# Patient Record
Sex: MALE | Race: OTHER | HISPANIC OR LATINO
[De-identification: names, ages, dates, MRNs, and addresses within clinical notes are randomized per-mention and may not be internally consistent; named-entity substitution may affect disease eponyms.]

---

## 2017-01-30 ENCOUNTER — APPOINTMENT (OUTPATIENT)
Dept: UROLOGY | Facility: CLINIC | Age: 47
End: 2017-01-30

## 2017-01-31 ENCOUNTER — APPOINTMENT (OUTPATIENT)
Dept: UROLOGY | Facility: CLINIC | Age: 47
End: 2017-01-31

## 2017-02-12 ENCOUNTER — RESULT REVIEW (OUTPATIENT)
Age: 47
End: 2017-02-12

## 2017-02-13 ENCOUNTER — OUTPATIENT (OUTPATIENT)
Dept: OUTPATIENT SERVICES | Facility: HOSPITAL | Age: 47
LOS: 1 days | Discharge: HOME | End: 2017-02-13

## 2017-02-13 ENCOUNTER — APPOINTMENT (OUTPATIENT)
Dept: HEMATOLOGY ONCOLOGY | Facility: CLINIC | Age: 47
End: 2017-02-13

## 2017-02-13 DIAGNOSIS — D64.9 ANEMIA, UNSPECIFIED: ICD-10-CM

## 2017-02-13 LAB
BASOPHILS # BLD AUTO: 0 K/UL — SIGNIFICANT CHANGE UP (ref 0–0.2)
BASOPHILS NFR BLD AUTO: 0.4 % — SIGNIFICANT CHANGE UP (ref 0–2)
EOSINOPHIL # BLD AUTO: 0.3 K/UL — SIGNIFICANT CHANGE UP (ref 0–0.5)
EOSINOPHIL NFR BLD AUTO: 3.5 % — SIGNIFICANT CHANGE UP (ref 0–6)
HCT VFR BLD CALC: 44.5 % — SIGNIFICANT CHANGE UP (ref 39–50)
HGB BLD-MCNC: 15 G/DL — SIGNIFICANT CHANGE UP (ref 13–17)
LYMPHOCYTES # BLD AUTO: 3 K/UL — SIGNIFICANT CHANGE UP (ref 1–3.3)
LYMPHOCYTES # BLD AUTO: 32 % — SIGNIFICANT CHANGE UP (ref 13–44)
MCHC RBC-ENTMCNC: 26.7 PG — LOW (ref 27–34)
MCHC RBC-ENTMCNC: 33.8 G/DL — SIGNIFICANT CHANGE UP (ref 32–36)
MCV RBC AUTO: 79 FL — LOW (ref 80–100)
MONOCYTES # BLD AUTO: 0.5 K/UL — SIGNIFICANT CHANGE UP (ref 0–0.9)
MONOCYTES NFR BLD AUTO: 5.2 % — SIGNIFICANT CHANGE UP (ref 2–14)
NEUTROPHILS # BLD AUTO: 5.5 K/UL — SIGNIFICANT CHANGE UP (ref 1.8–7.4)
NEUTROPHILS NFR BLD AUTO: 58.9 % — SIGNIFICANT CHANGE UP (ref 43–77)
PLATELET # BLD AUTO: 265 K/UL — SIGNIFICANT CHANGE UP (ref 150–400)
RBC # BLD: 5.64 M/UL — SIGNIFICANT CHANGE UP (ref 4.2–5.8)
RBC # FLD: 17.2 % — HIGH (ref 10.3–14.5)
WBC # BLD: 9.3 K/UL — SIGNIFICANT CHANGE UP (ref 3.8–10.5)
WBC # FLD AUTO: 9.3 K/UL — SIGNIFICANT CHANGE UP (ref 3.8–10.5)

## 2017-02-21 ENCOUNTER — APPOINTMENT (OUTPATIENT)
Dept: UROLOGY | Facility: CLINIC | Age: 47
End: 2017-02-21

## 2017-02-21 DIAGNOSIS — R10.2 PELVIC AND PERINEAL PAIN: ICD-10-CM

## 2017-04-12 ENCOUNTER — APPOINTMENT (OUTPATIENT)
Dept: SURGERY | Facility: CLINIC | Age: 47
End: 2017-04-12

## 2017-04-12 DIAGNOSIS — Z87.19 PERSONAL HISTORY OF OTHER DISEASES OF THE DIGESTIVE SYSTEM: ICD-10-CM

## 2017-04-12 DIAGNOSIS — K58.9 IRRITABLE BOWEL SYNDROME W/OUT DIARRHEA: ICD-10-CM

## 2017-05-08 ENCOUNTER — APPOINTMENT (OUTPATIENT)
Dept: HEMATOLOGY ONCOLOGY | Facility: CLINIC | Age: 47
End: 2017-05-08

## 2017-05-08 ENCOUNTER — LABORATORY RESULT (OUTPATIENT)
Age: 47
End: 2017-05-08

## 2017-05-08 ENCOUNTER — RESULT REVIEW (OUTPATIENT)
Age: 47
End: 2017-05-08

## 2017-05-08 ENCOUNTER — OUTPATIENT (OUTPATIENT)
Dept: OUTPATIENT SERVICES | Facility: HOSPITAL | Age: 47
LOS: 1 days | Discharge: HOME | End: 2017-05-08

## 2017-05-08 VITALS
DIASTOLIC BLOOD PRESSURE: 85 MMHG | OXYGEN SATURATION: 96 % | BODY MASS INDEX: 32.86 KG/M2 | TEMPERATURE: 208.76 F | HEART RATE: 60 BPM | SYSTOLIC BLOOD PRESSURE: 127 MMHG | WEIGHT: 224.43 LBS | RESPIRATION RATE: 16 BRPM

## 2017-05-08 DIAGNOSIS — D64.9 ANEMIA, UNSPECIFIED: ICD-10-CM

## 2017-05-08 LAB
BASOPHILS # BLD AUTO: 0.1 K/UL — SIGNIFICANT CHANGE UP (ref 0–0.2)
BASOPHILS NFR BLD AUTO: 0.9 % — SIGNIFICANT CHANGE UP (ref 0–2)
EOSINOPHIL # BLD AUTO: 0.1 K/UL — SIGNIFICANT CHANGE UP (ref 0–0.5)
EOSINOPHIL NFR BLD AUTO: 0.9 % — SIGNIFICANT CHANGE UP (ref 0–6)
HCT VFR BLD CALC: 46.2 % — SIGNIFICANT CHANGE UP (ref 39–50)
HGB BLD-MCNC: 15.3 G/DL — SIGNIFICANT CHANGE UP (ref 13–17)
LYMPHOCYTES # BLD AUTO: 28.1 % — SIGNIFICANT CHANGE UP (ref 13–44)
LYMPHOCYTES # BLD AUTO: 3 K/UL — SIGNIFICANT CHANGE UP (ref 1–3.3)
MCHC RBC-ENTMCNC: 26.9 PG — LOW (ref 27–34)
MCHC RBC-ENTMCNC: 33.1 G/DL — SIGNIFICANT CHANGE UP (ref 32–36)
MCV RBC AUTO: 81.5 FL — SIGNIFICANT CHANGE UP (ref 80–100)
MONOCYTES # BLD AUTO: 0.5 K/UL — SIGNIFICANT CHANGE UP (ref 0–0.9)
MONOCYTES NFR BLD AUTO: 4.9 % — SIGNIFICANT CHANGE UP (ref 2–14)
NEUTROPHILS # BLD AUTO: 7 K/UL — SIGNIFICANT CHANGE UP (ref 1.8–7.4)
NEUTROPHILS NFR BLD AUTO: 65.1 % — SIGNIFICANT CHANGE UP (ref 43–77)
PLATELET # BLD AUTO: 271 K/UL — SIGNIFICANT CHANGE UP (ref 150–400)
RBC # BLD: 5.67 M/UL — SIGNIFICANT CHANGE UP (ref 4.2–5.8)
RBC # FLD: 14.4 % — SIGNIFICANT CHANGE UP (ref 10.3–14.5)
WBC # BLD: 10.7 K/UL — HIGH (ref 3.8–10.5)
WBC # FLD AUTO: 10.7 K/UL — HIGH (ref 3.8–10.5)

## 2017-05-08 RX ORDER — LANSOPRAZOLE, AMOXICILLIN, CLARITHROMYCIN 30-500-500
KIT ORAL
Qty: 112 | Refills: 0 | Status: DISCONTINUED | COMMUNITY
Start: 2017-04-12

## 2017-05-08 RX ORDER — AMOXICILLIN AND CLAVULANATE POTASSIUM 875; 125 MG/1; MG/1
875-125 TABLET, COATED ORAL
Qty: 10 | Refills: 0 | Status: DISCONTINUED | COMMUNITY
Start: 2017-04-22

## 2017-05-08 RX ORDER — TOPIRAMATE 50 MG/1
50 CAPSULE, EXTENDED RELEASE ORAL
Qty: 30 | Refills: 0 | Status: ACTIVE | COMMUNITY
Start: 2017-04-20

## 2017-05-11 ENCOUNTER — APPOINTMENT (OUTPATIENT)
Dept: INFUSION THERAPY | Facility: HOSPITAL | Age: 47
End: 2017-05-11

## 2017-05-26 ENCOUNTER — EMERGENCY (EMERGENCY)
Facility: HOSPITAL | Age: 47
LOS: 1 days | Discharge: HOME | End: 2017-05-26
Attending: EMERGENCY MEDICINE
Payer: SELF-PAY

## 2017-05-26 VITALS
WEIGHT: 210.1 LBS | DIASTOLIC BLOOD PRESSURE: 87 MMHG | RESPIRATION RATE: 16 BRPM | OXYGEN SATURATION: 98 % | HEART RATE: 87 BPM | TEMPERATURE: 98 F | SYSTOLIC BLOOD PRESSURE: 135 MMHG | HEIGHT: 69 IN
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
## 2017-05-26 DIAGNOSIS — M54.9 DORSALGIA, UNSPECIFIED: ICD-10-CM
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
## 2017-05-26 DIAGNOSIS — I10 ESSENTIAL (PRIMARY) HYPERTENSION: ICD-10-CM

## 2017-05-26 DIAGNOSIS — D45 POLYCYTHEMIA VERA: ICD-10-CM
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
## 2017-05-26 PROCEDURE — 99283 EMERGENCY DEPT VISIT LOW MDM: CPT | Mod: 25

## 2017-05-26 PROCEDURE — 99283 EMERGENCY DEPT VISIT LOW MDM: CPT

## 2017-05-26 PROCEDURE — 72100 X-RAY EXAM L-S SPINE 2/3 VWS: CPT | Mod: 26

## 2017-05-26 PROCEDURE — 72100 X-RAY EXAM L-S SPINE 2/3 VWS: CPT

## 2017-05-26 RX ORDER — METHOCARBAMOL 500 MG/1
1500 TABLET, FILM COATED ORAL ONCE
Qty: 0 | Refills: 0 | Status: COMPLETED | OUTPATIENT
Start: 2017-05-26 | End: 2017-05-26

## 2017-05-26 RX ORDER — IBUPROFEN 200 MG
600 TABLET ORAL ONCE
Qty: 0 | Refills: 0 | Status: COMPLETED | OUTPATIENT
Start: 2017-05-26 | End: 2017-05-26

## 2017-05-26 RX ADMIN — METHOCARBAMOL 1500 MILLIGRAM(S): 500 TABLET, FILM COATED ORAL at 14:59

## 2017-05-26 RX ADMIN — Medication 600 MILLIGRAM(S): at 14:59

## 2017-05-26 RX ADMIN — Medication 600 MILLIGRAM(S): at 16:51

## 2017-05-26 NOTE — ED PROVIDER NOTE - PHYSICAL EXAMINATION
Back: B/L lumbar paraspinal tenderness and tenseness. No cervical, thoracic or lumbosacral midline bony deformities,  +rotation and flexion-extension of neck and truncal area intact. No saddle anesthesia, B/L knee, pedal and EHL flex and ext intact, normal gait, no foot drop. Truncal flexion and extension intact.
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
## 2017-05-26 NOTE — ED PROVIDER NOTE - MEDICAL DECISION MAKING DETAILS
Pt is well appearing walking with normal gait, stable for discharge and follow up with medical doctor. Pt educated on care and need for follow up. Discussed anticipatory guidance and return precautions. Questions answered. I had a detailed discussion with the patient and/or guardian regarding the historical points, exam findings, and any diagnostic results supporting the discharge diagnosis. Pt will continue with his Lyrica and Ultram (states has sufficient) and will f/u with his pain specialist.

## 2017-06-02 ENCOUNTER — APPOINTMENT (OUTPATIENT)
Dept: ENDOCRINOLOGY | Facility: CLINIC | Age: 47
End: 2017-06-02

## 2017-06-02 VITALS
HEART RATE: 84 BPM | HEIGHT: 69.29 IN | OXYGEN SATURATION: 98 % | BODY MASS INDEX: 32.22 KG/M2 | SYSTOLIC BLOOD PRESSURE: 120 MMHG | DIASTOLIC BLOOD PRESSURE: 82 MMHG | WEIGHT: 220 LBS

## 2017-06-02 DIAGNOSIS — Z83.3 FAMILY HISTORY OF DIABETES MELLITUS: ICD-10-CM

## 2017-06-02 DIAGNOSIS — Z82.49 FAMILY HISTORY OF ISCHEMIC HEART DISEASE AND OTHER DISEASES OF THE CIRCULATORY SYSTEM: ICD-10-CM

## 2017-06-02 LAB — HBA1C MFR BLD HPLC: 7.9

## 2017-06-02 RX ORDER — LEVOFLOXACIN 500 MG/1
500 TABLET, FILM COATED ORAL
Qty: 7 | Refills: 0 | Status: DISCONTINUED | COMMUNITY
Start: 2017-02-03

## 2017-06-02 RX ORDER — PROMETHAZINE HYDROCHLORIDE 6.25 MG/5ML
6.25 SOLUTION ORAL
Qty: 120 | Refills: 0 | Status: DISCONTINUED | COMMUNITY
Start: 2017-02-03

## 2017-06-02 RX ORDER — METHYLPREDNISOLONE 4 MG/1
4 TABLET ORAL
Qty: 21 | Refills: 0 | Status: DISCONTINUED | COMMUNITY
Start: 2017-02-03

## 2017-06-02 RX ORDER — CANAGLIFLOZIN 300 MG/1
300 TABLET, FILM COATED ORAL
Qty: 60 | Refills: 0 | Status: DISCONTINUED | COMMUNITY
Start: 2017-02-28 | End: 2017-06-02

## 2017-06-03 LAB
CREAT SPEC-SCNC: 129 MG/DL
MICROALBUMIN 24H UR DL<=1MG/L-MCNC: 0.5 MG/DL
MICROALBUMIN/CREAT 24H UR-RTO: 4

## 2017-06-08 ENCOUNTER — APPOINTMENT (OUTPATIENT)
Dept: OPHTHALMOLOGY | Facility: CLINIC | Age: 47
End: 2017-06-08

## 2017-06-20 ENCOUNTER — APPOINTMENT (OUTPATIENT)
Dept: OPHTHALMOLOGY | Facility: CLINIC | Age: 47
End: 2017-06-20

## 2017-06-21 ENCOUNTER — RESULT CHARGE (OUTPATIENT)
Age: 47
End: 2017-06-21

## 2017-06-21 ENCOUNTER — APPOINTMENT (OUTPATIENT)
Dept: ENDOCRINOLOGY | Facility: CLINIC | Age: 47
End: 2017-06-21

## 2017-06-21 VITALS — WEIGHT: 225.2 LBS | BODY MASS INDEX: 32.98 KG/M2

## 2017-06-21 LAB — GLUCOSE BLDC GLUCOMTR-MCNC: 143

## 2017-08-14 ENCOUNTER — APPOINTMENT (OUTPATIENT)
Dept: ENDOCRINOLOGY | Facility: CLINIC | Age: 47
End: 2017-08-14

## 2017-08-24 ENCOUNTER — RESULT REVIEW (OUTPATIENT)
Age: 47
End: 2017-08-24

## 2017-08-24 ENCOUNTER — APPOINTMENT (OUTPATIENT)
Dept: HEMATOLOGY ONCOLOGY | Facility: CLINIC | Age: 47
End: 2017-08-24
Payer: COMMERCIAL

## 2017-08-24 ENCOUNTER — OUTPATIENT (OUTPATIENT)
Dept: OUTPATIENT SERVICES | Facility: HOSPITAL | Age: 47
LOS: 1 days | Discharge: HOME | End: 2017-08-24

## 2017-08-24 VITALS
DIASTOLIC BLOOD PRESSURE: 95 MMHG | WEIGHT: 217 LBS | HEART RATE: 84 BPM | RESPIRATION RATE: 16 BRPM | OXYGEN SATURATION: 96 % | BODY MASS INDEX: 31.78 KG/M2 | TEMPERATURE: 210.38 F | SYSTOLIC BLOOD PRESSURE: 131 MMHG

## 2017-08-24 DIAGNOSIS — D64.9 ANEMIA, UNSPECIFIED: ICD-10-CM

## 2017-08-24 LAB
BASOPHILS # BLD AUTO: 0 K/UL — SIGNIFICANT CHANGE UP (ref 0–0.2)
BASOPHILS NFR BLD AUTO: 0.4 % — SIGNIFICANT CHANGE UP (ref 0–2)
EOSINOPHIL # BLD AUTO: 0.1 K/UL — SIGNIFICANT CHANGE UP (ref 0–0.5)
EOSINOPHIL NFR BLD AUTO: 1.2 % — SIGNIFICANT CHANGE UP (ref 0–6)
HCT VFR BLD CALC: 44.4 % — SIGNIFICANT CHANGE UP (ref 39–50)
HGB BLD-MCNC: 15.1 G/DL — SIGNIFICANT CHANGE UP (ref 13–17)
LYMPHOCYTES # BLD AUTO: 2.4 K/UL — SIGNIFICANT CHANGE UP (ref 1–3.3)
LYMPHOCYTES # BLD AUTO: 23.1 % — SIGNIFICANT CHANGE UP (ref 13–44)
MCHC RBC-ENTMCNC: 27.5 PG — SIGNIFICANT CHANGE UP (ref 27–34)
MCHC RBC-ENTMCNC: 34.1 G/DL — SIGNIFICANT CHANGE UP (ref 32–36)
MCV RBC AUTO: 80.6 FL — SIGNIFICANT CHANGE UP (ref 80–100)
MONOCYTES # BLD AUTO: 0.5 K/UL — SIGNIFICANT CHANGE UP (ref 0–0.9)
MONOCYTES NFR BLD AUTO: 5 % — SIGNIFICANT CHANGE UP (ref 2–14)
NEUTROPHILS # BLD AUTO: 7.2 K/UL — SIGNIFICANT CHANGE UP (ref 1.8–7.4)
NEUTROPHILS NFR BLD AUTO: 70.3 % — SIGNIFICANT CHANGE UP (ref 43–77)
PLATELET # BLD AUTO: 231 K/UL — SIGNIFICANT CHANGE UP (ref 150–400)
RBC # BLD: 5.51 M/UL — SIGNIFICANT CHANGE UP (ref 4.2–5.8)
RBC # FLD: 16.3 % — HIGH (ref 10.3–14.5)
WBC # BLD: 10.3 K/UL — SIGNIFICANT CHANGE UP (ref 3.8–10.5)
WBC # FLD AUTO: 10.3 K/UL — SIGNIFICANT CHANGE UP (ref 3.8–10.5)

## 2017-08-24 PROCEDURE — XXXXX: CPT

## 2017-08-24 PROCEDURE — 99213 OFFICE O/P EST LOW 20 MIN: CPT

## 2017-08-31 ENCOUNTER — RESULT REVIEW (OUTPATIENT)
Age: 47
End: 2017-08-31

## 2017-08-31 ENCOUNTER — APPOINTMENT (OUTPATIENT)
Dept: INFUSION THERAPY | Facility: HOSPITAL | Age: 47
End: 2017-08-31

## 2017-08-31 LAB
BASOPHILS # BLD AUTO: 0 K/UL — SIGNIFICANT CHANGE UP (ref 0–0.2)
BASOPHILS NFR BLD AUTO: 0.4 % — SIGNIFICANT CHANGE UP (ref 0–2)
EOSINOPHIL # BLD AUTO: 0.3 K/UL — SIGNIFICANT CHANGE UP (ref 0–0.5)
EOSINOPHIL NFR BLD AUTO: 2.9 % — SIGNIFICANT CHANGE UP (ref 0–6)
HCT VFR BLD CALC: 44.8 % — SIGNIFICANT CHANGE UP (ref 39–50)
HGB BLD-MCNC: 15.5 G/DL — SIGNIFICANT CHANGE UP (ref 13–17)
LYMPHOCYTES # BLD AUTO: 2.5 K/UL — SIGNIFICANT CHANGE UP (ref 1–3.3)
LYMPHOCYTES # BLD AUTO: 25.8 % — SIGNIFICANT CHANGE UP (ref 13–44)
MCHC RBC-ENTMCNC: 28.1 PG — SIGNIFICANT CHANGE UP (ref 27–34)
MCHC RBC-ENTMCNC: 34.7 G/DL — SIGNIFICANT CHANGE UP (ref 32–36)
MCV RBC AUTO: 80.9 FL — SIGNIFICANT CHANGE UP (ref 80–100)
MONOCYTES # BLD AUTO: 0.5 K/UL — SIGNIFICANT CHANGE UP (ref 0–0.9)
MONOCYTES NFR BLD AUTO: 5.2 % — SIGNIFICANT CHANGE UP (ref 2–14)
NEUTROPHILS # BLD AUTO: 6.3 K/UL — SIGNIFICANT CHANGE UP (ref 1.8–7.4)
NEUTROPHILS NFR BLD AUTO: 65.8 % — SIGNIFICANT CHANGE UP (ref 43–77)
PLATELET # BLD AUTO: 227 K/UL — SIGNIFICANT CHANGE UP (ref 150–400)
RBC # BLD: 5.53 M/UL — SIGNIFICANT CHANGE UP (ref 4.2–5.8)
RBC # FLD: 16.7 % — HIGH (ref 10.3–14.5)
WBC # BLD: 9.6 K/UL — SIGNIFICANT CHANGE UP (ref 3.8–10.5)
WBC # FLD AUTO: 9.6 K/UL — SIGNIFICANT CHANGE UP (ref 3.8–10.5)

## 2017-09-07 ENCOUNTER — APPOINTMENT (OUTPATIENT)
Dept: OPHTHALMOLOGY | Facility: CLINIC | Age: 47
End: 2017-09-07
Payer: COMMERCIAL

## 2017-09-07 PROCEDURE — 92004 COMPRE OPH EXAM NEW PT 1/>: CPT

## 2017-10-03 ENCOUNTER — APPOINTMENT (OUTPATIENT)
Dept: ENDOCRINOLOGY | Facility: CLINIC | Age: 47
End: 2017-10-03
Payer: SELF-PAY

## 2017-10-03 VITALS
WEIGHT: 223 LBS | OXYGEN SATURATION: 98 % | BODY MASS INDEX: 32.65 KG/M2 | DIASTOLIC BLOOD PRESSURE: 70 MMHG | HEIGHT: 69.29 IN | SYSTOLIC BLOOD PRESSURE: 120 MMHG | HEART RATE: 87 BPM

## 2017-10-03 DIAGNOSIS — Z23 ENCOUNTER FOR IMMUNIZATION: ICD-10-CM

## 2017-10-03 LAB
GLUCOSE BLDC GLUCOMTR-MCNC: 205
HBA1C MFR BLD HPLC: 6.6

## 2017-10-03 PROCEDURE — G0008: CPT

## 2017-10-03 PROCEDURE — 83036 HEMOGLOBIN GLYCOSYLATED A1C: CPT | Mod: QW

## 2017-10-03 PROCEDURE — 82962 GLUCOSE BLOOD TEST: CPT

## 2017-10-03 PROCEDURE — 99214 OFFICE O/P EST MOD 30 MIN: CPT | Mod: 25

## 2017-10-03 PROCEDURE — 90686 IIV4 VACC NO PRSV 0.5 ML IM: CPT

## 2017-10-03 RX ORDER — DULAGLUTIDE 0.75 MG/.5ML
0.75 INJECTION, SOLUTION SUBCUTANEOUS
Qty: 3 | Refills: 3 | Status: DISCONTINUED | COMMUNITY
Start: 2017-06-02 | End: 2017-10-03

## 2017-12-05 ENCOUNTER — APPOINTMENT (OUTPATIENT)
Dept: UROLOGY | Facility: CLINIC | Age: 47
End: 2017-12-05
Payer: COMMERCIAL

## 2017-12-05 PROCEDURE — 99214 OFFICE O/P EST MOD 30 MIN: CPT

## 2017-12-06 ENCOUNTER — RESULT REVIEW (OUTPATIENT)
Age: 47
End: 2017-12-06

## 2017-12-06 ENCOUNTER — OUTPATIENT (OUTPATIENT)
Dept: OUTPATIENT SERVICES | Facility: HOSPITAL | Age: 47
LOS: 1 days | Discharge: HOME | End: 2017-12-06

## 2017-12-06 ENCOUNTER — APPOINTMENT (OUTPATIENT)
Dept: HEMATOLOGY ONCOLOGY | Facility: CLINIC | Age: 47
End: 2017-12-06

## 2017-12-06 DIAGNOSIS — D64.9 ANEMIA, UNSPECIFIED: ICD-10-CM

## 2017-12-06 LAB
BASOPHILS # BLD AUTO: 0.1 K/UL — SIGNIFICANT CHANGE UP (ref 0–0.2)
BASOPHILS NFR BLD AUTO: 0.6 % — SIGNIFICANT CHANGE UP (ref 0–2)
EOSINOPHIL # BLD AUTO: 0.2 K/UL — SIGNIFICANT CHANGE UP (ref 0–0.5)
EOSINOPHIL NFR BLD AUTO: 1.7 % — SIGNIFICANT CHANGE UP (ref 0–6)
HCT VFR BLD CALC: 43.2 % — SIGNIFICANT CHANGE UP (ref 39–50)
HGB BLD-MCNC: 14.8 G/DL — SIGNIFICANT CHANGE UP (ref 13–17)
LYMPHOCYTES # BLD AUTO: 2.6 K/UL — SIGNIFICANT CHANGE UP (ref 1–3.3)
LYMPHOCYTES # BLD AUTO: 30.4 % — SIGNIFICANT CHANGE UP (ref 13–44)
MCHC RBC-ENTMCNC: 27.8 PG — SIGNIFICANT CHANGE UP (ref 27–34)
MCHC RBC-ENTMCNC: 34.2 G/DL — SIGNIFICANT CHANGE UP (ref 32–36)
MCV RBC AUTO: 81.3 FL — SIGNIFICANT CHANGE UP (ref 80–100)
MONOCYTES # BLD AUTO: 0.6 K/UL — SIGNIFICANT CHANGE UP (ref 0–0.9)
MONOCYTES NFR BLD AUTO: 6.4 % — SIGNIFICANT CHANGE UP (ref 2–14)
NEUTROPHILS # BLD AUTO: 5.3 K/UL — SIGNIFICANT CHANGE UP (ref 1.8–7.4)
NEUTROPHILS NFR BLD AUTO: 60.9 % — SIGNIFICANT CHANGE UP (ref 43–77)
PLATELET # BLD AUTO: 302 K/UL — SIGNIFICANT CHANGE UP (ref 150–400)
RBC # BLD: 5.31 M/UL — SIGNIFICANT CHANGE UP (ref 4.2–5.8)
RBC # FLD: 15.2 % — HIGH (ref 10.3–14.5)
WBC # BLD: 8.7 K/UL — SIGNIFICANT CHANGE UP (ref 3.8–10.5)
WBC # FLD AUTO: 8.7 K/UL — SIGNIFICANT CHANGE UP (ref 3.8–10.5)

## 2017-12-22 ENCOUNTER — APPOINTMENT (OUTPATIENT)
Dept: HEMATOLOGY ONCOLOGY | Facility: CLINIC | Age: 47
End: 2017-12-22
Payer: COMMERCIAL

## 2017-12-22 ENCOUNTER — RESULT REVIEW (OUTPATIENT)
Age: 47
End: 2017-12-22

## 2017-12-22 VITALS
BODY MASS INDEX: 32.22 KG/M2 | TEMPERATURE: 208.4 F | OXYGEN SATURATION: 97 % | SYSTOLIC BLOOD PRESSURE: 133 MMHG | DIASTOLIC BLOOD PRESSURE: 89 MMHG | WEIGHT: 220 LBS | HEART RATE: 72 BPM | RESPIRATION RATE: 16 BRPM

## 2017-12-22 LAB
BASOPHILS # BLD AUTO: 0.1 K/UL — SIGNIFICANT CHANGE UP (ref 0–0.2)
BASOPHILS NFR BLD AUTO: 0.9 % — SIGNIFICANT CHANGE UP (ref 0–2)
EOSINOPHIL # BLD AUTO: 0.1 K/UL — SIGNIFICANT CHANGE UP (ref 0–0.5)
EOSINOPHIL NFR BLD AUTO: 2.1 % — SIGNIFICANT CHANGE UP (ref 0–6)
HCT VFR BLD CALC: 40.6 % — SIGNIFICANT CHANGE UP (ref 39–50)
HGB BLD-MCNC: 14 G/DL — SIGNIFICANT CHANGE UP (ref 13–17)
LYMPHOCYTES # BLD AUTO: 1.7 K/UL — SIGNIFICANT CHANGE UP (ref 1–3.3)
LYMPHOCYTES # BLD AUTO: 25 % — SIGNIFICANT CHANGE UP (ref 13–44)
MCHC RBC-ENTMCNC: 27.9 PG — SIGNIFICANT CHANGE UP (ref 27–34)
MCHC RBC-ENTMCNC: 34.6 G/DL — SIGNIFICANT CHANGE UP (ref 32–36)
MCV RBC AUTO: 80.7 FL — SIGNIFICANT CHANGE UP (ref 80–100)
MONOCYTES # BLD AUTO: 0.4 K/UL — SIGNIFICANT CHANGE UP (ref 0–0.9)
MONOCYTES NFR BLD AUTO: 6.2 % — SIGNIFICANT CHANGE UP (ref 2–14)
NEUTROPHILS # BLD AUTO: 4.5 K/UL — SIGNIFICANT CHANGE UP (ref 1.8–7.4)
NEUTROPHILS NFR BLD AUTO: 65.7 % — SIGNIFICANT CHANGE UP (ref 43–77)
PLATELET # BLD AUTO: 233 K/UL — SIGNIFICANT CHANGE UP (ref 150–400)
RBC # BLD: 5.03 M/UL — SIGNIFICANT CHANGE UP (ref 4.2–5.8)
RBC # FLD: 14.9 % — HIGH (ref 10.3–14.5)
WBC # BLD: 6.9 K/UL — SIGNIFICANT CHANGE UP (ref 3.8–10.5)
WBC # FLD AUTO: 6.9 K/UL — SIGNIFICANT CHANGE UP (ref 3.8–10.5)

## 2017-12-22 PROCEDURE — 99213 OFFICE O/P EST LOW 20 MIN: CPT

## 2018-01-25 ENCOUNTER — APPOINTMENT (OUTPATIENT)
Dept: SURGERY | Facility: CLINIC | Age: 48
End: 2018-01-25
Payer: COMMERCIAL

## 2018-02-05 ENCOUNTER — RX RENEWAL (OUTPATIENT)
Age: 48
End: 2018-02-05

## 2018-02-08 ENCOUNTER — APPOINTMENT (OUTPATIENT)
Dept: SURGERY | Facility: CLINIC | Age: 48
End: 2018-02-08
Payer: COMMERCIAL

## 2018-02-08 VITALS — TEMPERATURE: 208.58 F | OXYGEN SATURATION: 98 % | HEART RATE: 66 BPM | RESPIRATION RATE: 15 BRPM

## 2018-02-08 VITALS — SYSTOLIC BLOOD PRESSURE: 148 MMHG | DIASTOLIC BLOOD PRESSURE: 99 MMHG

## 2018-02-08 PROCEDURE — 99213 OFFICE O/P EST LOW 20 MIN: CPT | Mod: 25

## 2018-02-08 PROCEDURE — 46600 DIAGNOSTIC ANOSCOPY SPX: CPT

## 2018-02-08 RX ORDER — ATORVASTATIN CALCIUM 20 MG/1
20 TABLET, FILM COATED ORAL DAILY
Qty: 90 | Refills: 0 | Status: DISCONTINUED | COMMUNITY
Start: 2017-02-28 | End: 2018-02-08

## 2018-02-08 RX ORDER — ESZOPICLONE 3 MG/1
3 TABLET, FILM COATED ORAL
Qty: 30 | Refills: 0 | Status: DISCONTINUED | COMMUNITY
Start: 2016-12-08 | End: 2018-02-08

## 2018-02-08 RX ORDER — TRIAMCINOLONE ACETONIDE 1 MG/G
0.1 OINTMENT TOPICAL
Qty: 80 | Refills: 0 | Status: DISCONTINUED | COMMUNITY
Start: 2017-03-26 | End: 2018-02-08

## 2018-02-08 RX ORDER — BACLOFEN 20 MG/1
20 TABLET ORAL
Qty: 30 | Refills: 0 | Status: ACTIVE | COMMUNITY
Start: 2017-12-21

## 2018-02-08 RX ORDER — IBUPROFEN 800 MG/1
800 TABLET, FILM COATED ORAL
Qty: 20 | Refills: 0 | Status: DISCONTINUED | COMMUNITY
Start: 2017-04-22 | End: 2018-02-08

## 2018-02-16 ENCOUNTER — APPOINTMENT (OUTPATIENT)
Dept: ENDOCRINOLOGY | Facility: CLINIC | Age: 48
End: 2018-02-16
Payer: COMMERCIAL

## 2018-02-16 VITALS
HEART RATE: 84 BPM | WEIGHT: 220 LBS | SYSTOLIC BLOOD PRESSURE: 120 MMHG | DIASTOLIC BLOOD PRESSURE: 80 MMHG | OXYGEN SATURATION: 98 % | HEIGHT: 69.29 IN | BODY MASS INDEX: 32.22 KG/M2

## 2018-02-16 LAB
GLUCOSE BLDC GLUCOMTR-MCNC: 256
HBA1C MFR BLD HPLC: 8.1

## 2018-02-16 PROCEDURE — 99215 OFFICE O/P EST HI 40 MIN: CPT | Mod: 25

## 2018-02-16 PROCEDURE — 82962 GLUCOSE BLOOD TEST: CPT

## 2018-02-16 PROCEDURE — 83036 HEMOGLOBIN GLYCOSYLATED A1C: CPT | Mod: QW

## 2018-02-21 ENCOUNTER — APPOINTMENT (OUTPATIENT)
Dept: ENDOCRINOLOGY | Facility: CLINIC | Age: 48
End: 2018-02-21
Payer: COMMERCIAL

## 2018-02-21 PROCEDURE — 95251 CONT GLUC MNTR ANALYSIS I&R: CPT

## 2018-02-21 PROCEDURE — 95250 CONT GLUC MNTR PHYS/QHP EQP: CPT

## 2018-03-01 ENCOUNTER — RESULT REVIEW (OUTPATIENT)
Age: 48
End: 2018-03-01

## 2018-03-01 ENCOUNTER — OUTPATIENT (OUTPATIENT)
Dept: OUTPATIENT SERVICES | Facility: HOSPITAL | Age: 48
LOS: 1 days | Discharge: HOME | End: 2018-03-01

## 2018-03-01 ENCOUNTER — APPOINTMENT (OUTPATIENT)
Dept: HEMATOLOGY ONCOLOGY | Facility: CLINIC | Age: 48
End: 2018-03-01

## 2018-03-01 DIAGNOSIS — D64.9 ANEMIA, UNSPECIFIED: ICD-10-CM

## 2018-03-01 LAB
BASOPHILS # BLD AUTO: 0.1 K/UL — SIGNIFICANT CHANGE UP (ref 0–0.2)
BASOPHILS NFR BLD AUTO: 0.8 % — SIGNIFICANT CHANGE UP (ref 0–2)
EOSINOPHIL # BLD AUTO: 0.1 K/UL — SIGNIFICANT CHANGE UP (ref 0–0.5)
EOSINOPHIL NFR BLD AUTO: 1.8 % — SIGNIFICANT CHANGE UP (ref 0–6)
HCT VFR BLD CALC: 42.3 % — SIGNIFICANT CHANGE UP (ref 39–50)
HGB BLD-MCNC: 14.7 G/DL — SIGNIFICANT CHANGE UP (ref 13–17)
LYMPHOCYTES # BLD AUTO: 2.3 K/UL — SIGNIFICANT CHANGE UP (ref 1–3.3)
LYMPHOCYTES # BLD AUTO: 31.5 % — SIGNIFICANT CHANGE UP (ref 13–44)
MCHC RBC-ENTMCNC: 27.9 PG — SIGNIFICANT CHANGE UP (ref 27–34)
MCHC RBC-ENTMCNC: 34.8 G/DL — SIGNIFICANT CHANGE UP (ref 32–36)
MCV RBC AUTO: 80 FL — SIGNIFICANT CHANGE UP (ref 80–100)
MONOCYTES # BLD AUTO: 0.4 K/UL — SIGNIFICANT CHANGE UP (ref 0–0.9)
MONOCYTES NFR BLD AUTO: 5.8 % — SIGNIFICANT CHANGE UP (ref 2–14)
NEUTROPHILS # BLD AUTO: 4.4 K/UL — SIGNIFICANT CHANGE UP (ref 1.8–7.4)
NEUTROPHILS NFR BLD AUTO: 60.1 % — SIGNIFICANT CHANGE UP (ref 43–77)
PLATELET # BLD AUTO: 270 K/UL — SIGNIFICANT CHANGE UP (ref 150–400)
RBC # BLD: 5.29 M/UL — SIGNIFICANT CHANGE UP (ref 4.2–5.8)
RBC # FLD: 14.6 % — HIGH (ref 10.3–14.5)
WBC # BLD: 7.3 K/UL — SIGNIFICANT CHANGE UP (ref 3.8–10.5)
WBC # FLD AUTO: 7.3 K/UL — SIGNIFICANT CHANGE UP (ref 3.8–10.5)

## 2018-03-09 ENCOUNTER — MEDICATION RENEWAL (OUTPATIENT)
Age: 48
End: 2018-03-09

## 2018-04-09 ENCOUNTER — RX RENEWAL (OUTPATIENT)
Age: 48
End: 2018-04-09

## 2018-05-03 ENCOUNTER — OUTPATIENT (OUTPATIENT)
Dept: OUTPATIENT SERVICES | Facility: HOSPITAL | Age: 48
LOS: 1 days | Discharge: HOME | End: 2018-05-03

## 2018-05-03 DIAGNOSIS — D64.9 ANEMIA, UNSPECIFIED: ICD-10-CM

## 2018-05-04 ENCOUNTER — APPOINTMENT (OUTPATIENT)
Dept: HEMATOLOGY ONCOLOGY | Facility: CLINIC | Age: 48
End: 2018-05-04
Payer: COMMERCIAL

## 2018-05-04 ENCOUNTER — RESULT REVIEW (OUTPATIENT)
Age: 48
End: 2018-05-04

## 2018-05-04 VITALS
BODY MASS INDEX: 32.22 KG/M2 | SYSTOLIC BLOOD PRESSURE: 136 MMHG | TEMPERATURE: 208.76 F | OXYGEN SATURATION: 98 % | DIASTOLIC BLOOD PRESSURE: 86 MMHG | HEART RATE: 84 BPM | WEIGHT: 220 LBS | RESPIRATION RATE: 16 BRPM

## 2018-05-04 LAB
BASOPHILS # BLD AUTO: 0 K/UL — SIGNIFICANT CHANGE UP (ref 0–0.2)
BASOPHILS NFR BLD AUTO: 0.3 % — SIGNIFICANT CHANGE UP (ref 0–2)
EOSINOPHIL # BLD AUTO: 0 K/UL — SIGNIFICANT CHANGE UP (ref 0–0.5)
EOSINOPHIL NFR BLD AUTO: 0.7 % — SIGNIFICANT CHANGE UP (ref 0–6)
HCT VFR BLD CALC: 41.8 % — SIGNIFICANT CHANGE UP (ref 39–50)
HGB BLD-MCNC: 14.6 G/DL — SIGNIFICANT CHANGE UP (ref 13–17)
LYMPHOCYTES # BLD AUTO: 1.2 K/UL — SIGNIFICANT CHANGE UP (ref 1–3.3)
LYMPHOCYTES # BLD AUTO: 19 % — SIGNIFICANT CHANGE UP (ref 13–44)
MCHC RBC-ENTMCNC: 28.8 PG — SIGNIFICANT CHANGE UP (ref 27–34)
MCHC RBC-ENTMCNC: 35 G/DL — SIGNIFICANT CHANGE UP (ref 32–36)
MCV RBC AUTO: 82.4 FL — SIGNIFICANT CHANGE UP (ref 80–100)
MONOCYTES # BLD AUTO: 0.5 K/UL — SIGNIFICANT CHANGE UP (ref 0–0.9)
MONOCYTES NFR BLD AUTO: 7.3 % — SIGNIFICANT CHANGE UP (ref 2–14)
NEUTROPHILS # BLD AUTO: 4.6 K/UL — SIGNIFICANT CHANGE UP (ref 1.8–7.4)
NEUTROPHILS NFR BLD AUTO: 72.7 % — SIGNIFICANT CHANGE UP (ref 43–77)
PLATELET # BLD AUTO: 180 K/UL — SIGNIFICANT CHANGE UP (ref 150–400)
RBC # BLD: 5.08 M/UL — SIGNIFICANT CHANGE UP (ref 4.2–5.8)
RBC # FLD: 15.4 % — HIGH (ref 10.3–14.5)
WBC # BLD: 6.4 K/UL — SIGNIFICANT CHANGE UP (ref 3.8–10.5)
WBC # FLD AUTO: 6.4 K/UL — SIGNIFICANT CHANGE UP (ref 3.8–10.5)

## 2018-05-04 PROCEDURE — 99213 OFFICE O/P EST LOW 20 MIN: CPT

## 2018-05-10 ENCOUNTER — EMERGENCY (EMERGENCY)
Facility: HOSPITAL | Age: 48
LOS: 1 days | Discharge: HOME | End: 2018-05-10
Attending: EMERGENCY MEDICINE | Admitting: EMERGENCY MEDICINE
Payer: OTHER MISCELLANEOUS

## 2018-05-10 VITALS
DIASTOLIC BLOOD PRESSURE: 102 MMHG | OXYGEN SATURATION: 97 % | TEMPERATURE: 99 F | RESPIRATION RATE: 15 BRPM | HEART RATE: 65 BPM | SYSTOLIC BLOOD PRESSURE: 146 MMHG

## 2018-05-10 DIAGNOSIS — Z98.890 OTHER SPECIFIED POSTPROCEDURAL STATES: Chronic | ICD-10-CM

## 2018-05-10 DIAGNOSIS — Z98.1 ARTHRODESIS STATUS: Chronic | ICD-10-CM

## 2018-05-10 LAB
ALBUMIN SERPL ELPH-MCNC: 4 G/DL — SIGNIFICANT CHANGE UP (ref 3.3–5)
ALP SERPL-CCNC: 59 U/L — SIGNIFICANT CHANGE UP (ref 40–120)
ALT FLD-CCNC: 49 U/L — HIGH (ref 4–41)
AST SERPL-CCNC: 22 U/L — SIGNIFICANT CHANGE UP (ref 4–40)
BASOPHILS # BLD AUTO: 0.01 K/UL — SIGNIFICANT CHANGE UP (ref 0–0.2)
BASOPHILS NFR BLD AUTO: 0.1 % — SIGNIFICANT CHANGE UP (ref 0–2)
BILIRUB SERPL-MCNC: 0.4 MG/DL — SIGNIFICANT CHANGE UP (ref 0.2–1.2)
BUN SERPL-MCNC: 11 MG/DL — SIGNIFICANT CHANGE UP (ref 7–23)
CALCIUM SERPL-MCNC: 9.1 MG/DL — SIGNIFICANT CHANGE UP (ref 8.4–10.5)
CHLORIDE SERPL-SCNC: 101 MMOL/L — SIGNIFICANT CHANGE UP (ref 98–107)
CO2 SERPL-SCNC: 25 MMOL/L — SIGNIFICANT CHANGE UP (ref 22–31)
CREAT SERPL-MCNC: 0.73 MG/DL — SIGNIFICANT CHANGE UP (ref 0.5–1.3)
EOSINOPHIL # BLD AUTO: 0.01 K/UL — SIGNIFICANT CHANGE UP (ref 0–0.5)
EOSINOPHIL NFR BLD AUTO: 0.1 % — SIGNIFICANT CHANGE UP (ref 0–6)
GLUCOSE SERPL-MCNC: 156 MG/DL — HIGH (ref 70–99)
HCT VFR BLD CALC: 44 % — SIGNIFICANT CHANGE UP (ref 39–50)
HGB BLD-MCNC: 14.7 G/DL — SIGNIFICANT CHANGE UP (ref 13–17)
IMM GRANULOCYTES # BLD AUTO: 0.03 # — SIGNIFICANT CHANGE UP
IMM GRANULOCYTES NFR BLD AUTO: 0.4 % — SIGNIFICANT CHANGE UP (ref 0–1.5)
LYMPHOCYTES # BLD AUTO: 1.45 K/UL — SIGNIFICANT CHANGE UP (ref 1–3.3)
LYMPHOCYTES # BLD AUTO: 18.7 % — SIGNIFICANT CHANGE UP (ref 13–44)
MCHC RBC-ENTMCNC: 26.9 PG — LOW (ref 27–34)
MCHC RBC-ENTMCNC: 33.4 % — SIGNIFICANT CHANGE UP (ref 32–36)
MCV RBC AUTO: 80.4 FL — SIGNIFICANT CHANGE UP (ref 80–100)
MONOCYTES # BLD AUTO: 0.38 K/UL — SIGNIFICANT CHANGE UP (ref 0–0.9)
MONOCYTES NFR BLD AUTO: 4.9 % — SIGNIFICANT CHANGE UP (ref 2–14)
NEUTROPHILS # BLD AUTO: 5.86 K/UL — SIGNIFICANT CHANGE UP (ref 1.8–7.4)
NEUTROPHILS NFR BLD AUTO: 75.8 % — SIGNIFICANT CHANGE UP (ref 43–77)
NRBC # FLD: 0 — SIGNIFICANT CHANGE UP
PLATELET # BLD AUTO: 203 K/UL — SIGNIFICANT CHANGE UP (ref 150–400)
PMV BLD: 8.9 FL — SIGNIFICANT CHANGE UP (ref 7–13)
POTASSIUM SERPL-MCNC: 4.1 MMOL/L — SIGNIFICANT CHANGE UP (ref 3.5–5.3)
POTASSIUM SERPL-SCNC: 4.1 MMOL/L — SIGNIFICANT CHANGE UP (ref 3.5–5.3)
PROT SERPL-MCNC: 6.5 G/DL — SIGNIFICANT CHANGE UP (ref 6–8.3)
RBC # BLD: 5.47 M/UL — SIGNIFICANT CHANGE UP (ref 4.2–5.8)
RBC # FLD: 15.8 % — HIGH (ref 10.3–14.5)
SODIUM SERPL-SCNC: 139 MMOL/L — SIGNIFICANT CHANGE UP (ref 135–145)
WBC # BLD: 7.74 K/UL — SIGNIFICANT CHANGE UP (ref 3.8–10.5)
WBC # FLD AUTO: 7.74 K/UL — SIGNIFICANT CHANGE UP (ref 3.8–10.5)

## 2018-05-10 PROCEDURE — 99053 MED SERV 10PM-8AM 24 HR FAC: CPT

## 2018-05-10 PROCEDURE — 70450 CT HEAD/BRAIN W/O DYE: CPT | Mod: 26

## 2018-05-10 PROCEDURE — 71046 X-RAY EXAM CHEST 2 VIEWS: CPT | Mod: 26

## 2018-05-10 PROCEDURE — 99220: CPT

## 2018-05-10 RX ORDER — METOCLOPRAMIDE HCL 10 MG
10 TABLET ORAL ONCE
Qty: 0 | Refills: 0 | Status: COMPLETED | OUTPATIENT
Start: 2018-05-10 | End: 2018-05-10

## 2018-05-10 RX ORDER — DEXTROSE 50 % IN WATER 50 %
25 SYRINGE (ML) INTRAVENOUS ONCE
Qty: 0 | Refills: 0 | Status: DISCONTINUED | OUTPATIENT
Start: 2018-05-10 | End: 2018-05-14

## 2018-05-10 RX ORDER — SODIUM CHLORIDE 9 MG/ML
1000 INJECTION INTRAMUSCULAR; INTRAVENOUS; SUBCUTANEOUS ONCE
Qty: 0 | Refills: 0 | Status: COMPLETED | OUTPATIENT
Start: 2018-05-10 | End: 2018-05-10

## 2018-05-10 RX ORDER — DEXTROSE 50 % IN WATER 50 %
15 SYRINGE (ML) INTRAVENOUS ONCE
Qty: 0 | Refills: 0 | Status: DISCONTINUED | OUTPATIENT
Start: 2018-05-10 | End: 2018-05-14

## 2018-05-10 RX ORDER — GLIMEPIRIDE 1 MG
2 TABLET ORAL
Qty: 0 | Refills: 0 | Status: DISCONTINUED | OUTPATIENT
Start: 2018-05-10 | End: 2018-05-14

## 2018-05-10 RX ORDER — SODIUM CHLORIDE 9 MG/ML
1000 INJECTION, SOLUTION INTRAVENOUS
Qty: 0 | Refills: 0 | Status: DISCONTINUED | OUTPATIENT
Start: 2018-05-10 | End: 2018-05-14

## 2018-05-10 RX ORDER — GLUCAGON INJECTION, SOLUTION 0.5 MG/.1ML
1 INJECTION, SOLUTION SUBCUTANEOUS ONCE
Qty: 0 | Refills: 0 | Status: DISCONTINUED | OUTPATIENT
Start: 2018-05-10 | End: 2018-05-14

## 2018-05-10 RX ORDER — DEXTROSE 50 % IN WATER 50 %
12.5 SYRINGE (ML) INTRAVENOUS ONCE
Qty: 0 | Refills: 0 | Status: DISCONTINUED | OUTPATIENT
Start: 2018-05-10 | End: 2018-05-14

## 2018-05-10 RX ORDER — MECLIZINE HCL 12.5 MG
25 TABLET ORAL ONCE
Qty: 0 | Refills: 0 | Status: COMPLETED | OUTPATIENT
Start: 2018-05-10 | End: 2018-05-10

## 2018-05-10 RX ADMIN — Medication 10 MILLIGRAM(S): at 11:45

## 2018-05-10 RX ADMIN — Medication 2 MILLIGRAM(S): at 20:37

## 2018-05-10 RX ADMIN — Medication 25 MILLIGRAM(S): at 15:58

## 2018-05-10 RX ADMIN — SODIUM CHLORIDE 1000 MILLILITER(S): 9 INJECTION INTRAMUSCULAR; INTRAVENOUS; SUBCUTANEOUS at 15:58

## 2018-05-10 RX ADMIN — SODIUM CHLORIDE 1000 MILLILITER(S): 9 INJECTION INTRAMUSCULAR; INTRAVENOUS; SUBCUTANEOUS at 09:01

## 2018-05-10 RX ADMIN — Medication 25 MILLIGRAM(S): at 09:01
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
## 2018-05-10 NOTE — ED CDU PROVIDER INITIAL DAY NOTE - CHPI ED SYMPTOMS NEG
no chills/no decreased eating/drinking/no pain/no numbness/no tingling/no vomiting/no nausea/no weakness/no fever

## 2018-05-10 NOTE — ED PROVIDER NOTE - MEDICAL DECISION MAKING DETAILS
47M pmhx polycythemia, orthopedic back surgery, here c/o episodic dizziness x 3 days. On exam pt appears mildly dehydrated, exhibits b/l horizontal nystagamus. Lungs clear, RRR S1&S2 no murmurs. Neuro exam otherwise normal. Will obtain labs, cxr, ekg, give fluids & meclizine and reassess.

## 2018-05-10 NOTE — ED CDU PROVIDER INITIAL DAY NOTE - PHYSICAL EXAMINATION
inducible dizziness with extra ocular movements  Neuro: A&Ox3, CN II-VII intact, normal facial expressions, inducible dizziness with extra ocular movements  Neuro: A&Ox3, CN II-VII intact, normal facial expressions, strength 5/5 in all extremities, sensation equal b/l, no pronator drift, normal finger to nose, normal heel to shin testing, no gait abnormality

## 2018-05-10 NOTE — ED CDU PROVIDER INITIAL DAY NOTE - PROGRESS NOTE DETAILS
Neurology recommending MRI to r.o intracranial process given hx of polycythemia vera and TBI. RAMON Palacio: Pts neurologists are  [phone] and  [phone] Pt went for MRI. Pt was not able to tolerated due to claustrophobia. Pt was given valium PO with no relief of symptoms. Neuro resident called, to update about pt. Recommends to order ct angio of head and neck. Test ordered. Pt agrees with new plan.

## 2018-05-10 NOTE — ED PROVIDER NOTE - ATTENDING CONTRIBUTION TO CARE
DR. BLOCH, ATTENDING MD-  I performed a face to face bedside interview with patient regarding history of present illness, review of symptoms and past medical history. I completed an independent physical exam.  I have discussed patient's plan of care with the resident.  Patient examined well appearing, HEENT perrl, nystagmus and vertiginous symptoms, 2-12 intact, neck supple, chest clear, heart sounds s1s2. abd soft no cerebellar  findings. motor and sensory intact.
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
## 2018-05-11 VITALS
OXYGEN SATURATION: 96 % | RESPIRATION RATE: 15 BRPM | DIASTOLIC BLOOD PRESSURE: 86 MMHG | HEART RATE: 63 BPM | TEMPERATURE: 98 F | SYSTOLIC BLOOD PRESSURE: 126 MMHG

## 2018-05-11 LAB — HBA1C BLD-MCNC: 8.6 % — HIGH (ref 4–5.6)

## 2018-05-11 PROCEDURE — 70496 CT ANGIOGRAPHY HEAD: CPT | Mod: 26

## 2018-05-11 PROCEDURE — 99217: CPT

## 2018-05-11 PROCEDURE — 99244 OFF/OP CNSLTJ NEW/EST MOD 40: CPT | Mod: GC

## 2018-05-11 PROCEDURE — 70498 CT ANGIOGRAPHY NECK: CPT | Mod: 26

## 2018-05-11 RX ORDER — DIAZEPAM 5 MG
1 TABLET ORAL
Qty: 12 | Refills: 0 | OUTPATIENT
Start: 2018-05-11 | End: 2018-05-17

## 2018-05-11 RX ORDER — MECLIZINE HCL 12.5 MG
1 TABLET ORAL
Qty: 21 | Refills: 0 | OUTPATIENT
Start: 2018-05-11 | End: 2018-05-17

## 2018-05-11 RX ORDER — METFORMIN HYDROCHLORIDE 850 MG/1
1 TABLET ORAL
Qty: 28 | Refills: 0 | OUTPATIENT
Start: 2018-05-11 | End: 2018-05-24
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
## 2018-05-11 NOTE — ED CDU PROVIDER SUBSEQUENT DAY NOTE - ENMT, MLM
Airway patent. Nasal mucosa clear. Mouth with normal mucosa. Throat has no vesicles, no oropharyngeal exudates and uvula is midline. Airway patent. Nasal mucosa clear. Mouth with normal mucosa. Throat has no vesicles, no oropharyngeal exudates and uvula is midline. TMI b/l.

## 2018-05-11 NOTE — ED CDU PROVIDER SUBSEQUENT DAY NOTE - PROGRESS NOTE DETAILS
Received sign out from RAMON Dyer. Pt seen and examined by Dr. Salinas and I. pt admitting to feeling dizzy still although much better. no n/v. awaiting MRI and neuro-eval. will likely pre-medicate with IV ativan due to pt's anxiety with MRI. pt cleared by neuro for discharge, suggesting to cancel MRI, can be performed outpatient. likely peripheral vertigo, possible viral labyrinthitis - suggesting medrol dose pack, meclizine, vestibular therapy, ent and neruo f/u outpt. pt amenable for dc home. ambulatory, advised not to drive home. pt cleared by neuro for discharge, suggesting to cancel MRI, can be performed outpatient. likely peripheral vertigo, possible viral labyrinthitis - suggesting medrol dose pack, meclizine, vestibular therapy, ent and neruo f/u outpt. pt amenable for dc home. ambulatory, advised not to drive home. discussed new onset DM, will dc with metformin and endo follow up
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
## 2018-05-11 NOTE — ED CDU PROVIDER SUBSEQUENT DAY NOTE - EYES, MLM
Clear bilaterally, pupils equal, round and reactive to light. Horizontal nystagmus with change of position

## 2018-05-11 NOTE — ED CDU PROVIDER SUBSEQUENT DAY NOTE - PHYSICAL EXAMINATION
Neuro: A&Ox3, CN II-VII intact, normal facial expressions, strength 5/5 in all extremities, sensation equal b/l, no pronator drift, normal finger to nose, normal heel to shin testing, no gait abnormality

## 2018-05-18 ENCOUNTER — INPATIENT (INPATIENT)
Facility: HOSPITAL | Age: 48
LOS: 2 days | Discharge: HOME | End: 2018-05-21
Attending: HOSPITALIST | Admitting: HOSPITALIST
Payer: COMMERCIAL

## 2018-05-18 VITALS
OXYGEN SATURATION: 100 % | RESPIRATION RATE: 14 BRPM | DIASTOLIC BLOOD PRESSURE: 99 MMHG | TEMPERATURE: 98 F | HEART RATE: 98 BPM | SYSTOLIC BLOOD PRESSURE: 160 MMHG

## 2018-05-18 DIAGNOSIS — Z98.890 OTHER SPECIFIED POSTPROCEDURAL STATES: Chronic | ICD-10-CM

## 2018-05-18 DIAGNOSIS — Z98.1 ARTHRODESIS STATUS: Chronic | ICD-10-CM

## 2018-05-18 LAB
ALBUMIN SERPL ELPH-MCNC: 4 G/DL — SIGNIFICANT CHANGE UP (ref 3.3–5)
ALP SERPL-CCNC: 64 U/L — SIGNIFICANT CHANGE UP (ref 40–120)
ALT FLD-CCNC: 33 U/L — SIGNIFICANT CHANGE UP (ref 4–41)
APTT BLD: 30 SEC — SIGNIFICANT CHANGE UP (ref 27.5–37.4)
AST SERPL-CCNC: 21 U/L — SIGNIFICANT CHANGE UP (ref 4–40)
BASE EXCESS BLDV CALC-SCNC: 2.4 MMOL/L — SIGNIFICANT CHANGE UP
BASOPHILS # BLD AUTO: 0.04 K/UL — SIGNIFICANT CHANGE UP (ref 0–0.2)
BASOPHILS NFR BLD AUTO: 0.4 % — SIGNIFICANT CHANGE UP (ref 0–2)
BILIRUB SERPL-MCNC: 0.7 MG/DL — SIGNIFICANT CHANGE UP (ref 0.2–1.2)
BLOOD GAS VENOUS - CREATININE: 0.86 MG/DL — SIGNIFICANT CHANGE UP (ref 0.5–1.3)
BUN SERPL-MCNC: 10 MG/DL — SIGNIFICANT CHANGE UP (ref 7–23)
CALCIUM SERPL-MCNC: 9.2 MG/DL — SIGNIFICANT CHANGE UP (ref 8.4–10.5)
CHLORIDE BLDV-SCNC: 109 MMOL/L — HIGH (ref 96–108)
CHLORIDE SERPL-SCNC: 102 MMOL/L — SIGNIFICANT CHANGE UP (ref 98–107)
CO2 SERPL-SCNC: 27 MMOL/L — SIGNIFICANT CHANGE UP (ref 22–31)
CREAT SERPL-MCNC: 0.83 MG/DL — SIGNIFICANT CHANGE UP (ref 0.5–1.3)
EOSINOPHIL # BLD AUTO: 0.07 K/UL — SIGNIFICANT CHANGE UP (ref 0–0.5)
EOSINOPHIL NFR BLD AUTO: 0.6 % — SIGNIFICANT CHANGE UP (ref 0–6)
GAS PNL BLDV: 135 MMOL/L — LOW (ref 136–146)
GLUCOSE BLDV-MCNC: 157 — HIGH (ref 70–99)
GLUCOSE SERPL-MCNC: 155 MG/DL — HIGH (ref 70–99)
HCO3 BLDV-SCNC: 26 MMOL/L — SIGNIFICANT CHANGE UP (ref 20–27)
HCT VFR BLD CALC: 45 % — SIGNIFICANT CHANGE UP (ref 39–50)
HCT VFR BLDV CALC: 47.3 % — SIGNIFICANT CHANGE UP (ref 39–51)
HGB BLD-MCNC: 15.1 G/DL — SIGNIFICANT CHANGE UP (ref 13–17)
HGB BLDV-MCNC: 15.4 G/DL — SIGNIFICANT CHANGE UP (ref 13–17)
IMM GRANULOCYTES # BLD AUTO: 0.07 # — SIGNIFICANT CHANGE UP
IMM GRANULOCYTES NFR BLD AUTO: 0.6 % — SIGNIFICANT CHANGE UP (ref 0–1.5)
INR BLD: 1.03 — SIGNIFICANT CHANGE UP (ref 0.88–1.17)
LACTATE BLDV-MCNC: 1.8 MMOL/L — SIGNIFICANT CHANGE UP (ref 0.5–2)
LYMPHOCYTES # BLD AUTO: 19.6 % — SIGNIFICANT CHANGE UP (ref 13–44)
LYMPHOCYTES # BLD AUTO: 2.2 K/UL — SIGNIFICANT CHANGE UP (ref 1–3.3)
MCHC RBC-ENTMCNC: 27 PG — SIGNIFICANT CHANGE UP (ref 27–34)
MCHC RBC-ENTMCNC: 33.6 % — SIGNIFICANT CHANGE UP (ref 32–36)
MCV RBC AUTO: 80.4 FL — SIGNIFICANT CHANGE UP (ref 80–100)
MONOCYTES # BLD AUTO: 0.87 K/UL — SIGNIFICANT CHANGE UP (ref 0–0.9)
MONOCYTES NFR BLD AUTO: 7.7 % — SIGNIFICANT CHANGE UP (ref 2–14)
NEUTROPHILS # BLD AUTO: 7.99 K/UL — HIGH (ref 1.8–7.4)
NEUTROPHILS NFR BLD AUTO: 71.1 % — SIGNIFICANT CHANGE UP (ref 43–77)
NRBC # FLD: 0 — SIGNIFICANT CHANGE UP
PCO2 BLDV: 39 MMHG — LOW (ref 41–51)
PH BLDV: 7.44 PH — HIGH (ref 7.32–7.43)
PLATELET # BLD AUTO: 238 K/UL — SIGNIFICANT CHANGE UP (ref 150–400)
PMV BLD: 9.1 FL — SIGNIFICANT CHANGE UP (ref 7–13)
PO2 BLDV: 57 MMHG — HIGH (ref 35–40)
POTASSIUM BLDV-SCNC: 3.4 MMOL/L — SIGNIFICANT CHANGE UP (ref 3.4–4.5)
POTASSIUM SERPL-MCNC: 3.7 MMOL/L — SIGNIFICANT CHANGE UP (ref 3.5–5.3)
POTASSIUM SERPL-SCNC: 3.7 MMOL/L — SIGNIFICANT CHANGE UP (ref 3.5–5.3)
PROT SERPL-MCNC: 6.8 G/DL — SIGNIFICANT CHANGE UP (ref 6–8.3)
PROTHROM AB SERPL-ACNC: 11.9 SEC — SIGNIFICANT CHANGE UP (ref 9.8–13.1)
RBC # BLD: 5.6 M/UL — SIGNIFICANT CHANGE UP (ref 4.2–5.8)
RBC # FLD: 15.8 % — HIGH (ref 10.3–14.5)
SAO2 % BLDV: 89.5 % — HIGH (ref 60–85)
SODIUM SERPL-SCNC: 141 MMOL/L — SIGNIFICANT CHANGE UP (ref 135–145)
TROPONIN T SERPL-MCNC: < 0.06 NG/ML — SIGNIFICANT CHANGE UP (ref 0–0.06)
TROPONIN T SERPL-MCNC: < 0.06 NG/ML — SIGNIFICANT CHANGE UP (ref 0–0.06)
WBC # BLD: 11.24 K/UL — HIGH (ref 3.8–10.5)
WBC # FLD AUTO: 11.24 K/UL — HIGH (ref 3.8–10.5)

## 2018-05-18 PROCEDURE — 73630 X-RAY EXAM OF FOOT: CPT | Mod: 26,LT

## 2018-05-18 PROCEDURE — 93970 EXTREMITY STUDY: CPT | Mod: 26

## 2018-05-18 PROCEDURE — 73610 X-RAY EXAM OF ANKLE: CPT | Mod: 26,LT

## 2018-05-18 PROCEDURE — 93010 ELECTROCARDIOGRAM REPORT: CPT

## 2018-05-18 PROCEDURE — 71275 CT ANGIOGRAPHY CHEST: CPT | Mod: 26

## 2018-05-18 PROCEDURE — 93306 TTE W/DOPPLER COMPLETE: CPT | Mod: 26

## 2018-05-18 PROCEDURE — 71046 X-RAY EXAM CHEST 2 VIEWS: CPT | Mod: 26

## 2018-05-18 PROCEDURE — 73590 X-RAY EXAM OF LOWER LEG: CPT | Mod: 26,LT

## 2018-05-18 RX ORDER — NEBIVOLOL HYDROCHLORIDE 5 MG/1
10 TABLET ORAL DAILY
Qty: 0 | Refills: 0 | Status: DISCONTINUED | OUTPATIENT
Start: 2018-05-18 | End: 2018-05-21

## 2018-05-18 RX ORDER — RIVAROXABAN 15 MG-20MG
15 KIT ORAL
Qty: 0 | Refills: 0 | Status: COMPLETED | OUTPATIENT
Start: 2018-05-18 | End: 2018-05-19

## 2018-05-18 RX ORDER — OXYCODONE AND ACETAMINOPHEN 5; 325 MG/1; MG/1
1 TABLET ORAL ONCE
Qty: 0 | Refills: 0 | Status: DISCONTINUED | OUTPATIENT
Start: 2018-05-18 | End: 2018-05-18

## 2018-05-18 RX ORDER — ACETAMINOPHEN 500 MG
650 TABLET ORAL ONCE
Qty: 0 | Refills: 0 | Status: COMPLETED | OUTPATIENT
Start: 2018-05-18 | End: 2018-05-18

## 2018-05-18 RX ORDER — METOCLOPRAMIDE HCL 10 MG
10 TABLET ORAL ONCE
Qty: 0 | Refills: 0 | Status: COMPLETED | OUTPATIENT
Start: 2018-05-18 | End: 2018-05-18

## 2018-05-18 RX ORDER — KETOROLAC TROMETHAMINE 30 MG/ML
15 SYRINGE (ML) INJECTION ONCE
Qty: 0 | Refills: 0 | Status: DISCONTINUED | OUTPATIENT
Start: 2018-05-18 | End: 2018-05-18

## 2018-05-18 RX ORDER — GLIMEPIRIDE 1 MG
2 TABLET ORAL
Qty: 0 | Refills: 0 | Status: DISCONTINUED | OUTPATIENT
Start: 2018-05-18 | End: 2018-05-19

## 2018-05-18 RX ORDER — DILTIAZEM HCL 120 MG
180 CAPSULE, EXT RELEASE 24 HR ORAL DAILY
Qty: 0 | Refills: 0 | Status: DISCONTINUED | OUTPATIENT
Start: 2018-05-18 | End: 2018-05-18

## 2018-05-18 RX ADMIN — Medication 10 MILLIGRAM(S): at 15:17

## 2018-05-18 RX ADMIN — OXYCODONE AND ACETAMINOPHEN 1 TABLET(S): 5; 325 TABLET ORAL at 17:49

## 2018-05-18 RX ADMIN — Medication 50 MILLIGRAM(S): at 21:37

## 2018-05-18 RX ADMIN — OXYCODONE AND ACETAMINOPHEN 1 TABLET(S): 5; 325 TABLET ORAL at 18:33

## 2018-05-18 RX ADMIN — Medication 1 TABLET(S): at 23:19

## 2018-05-18 RX ADMIN — Medication 100 MILLIGRAM(S): at 12:09

## 2018-05-18 RX ADMIN — RIVAROXABAN 15 MILLIGRAM(S): KIT at 12:27

## 2018-05-18 RX ADMIN — Medication 650 MILLIGRAM(S): at 12:52

## 2018-05-18 NOTE — ED PROVIDER NOTE - MEDICAL DECISION MAKING DETAILS
46 y/o M pt with hx of chronic back pain and Polycythemia vera here with LLE pain. Rule out DVT, possibly Phlebitis. Pt also with SOB, so obtain work up. Chest XR, EKG, Troponin and pain meds. Reassess

## 2018-05-18 NOTE — ED PROVIDER NOTE - LOWER EXTREMITY EXAM, LEFT
TENDERNESS/Distal neurovascularly intact, mild erythema and warmth to the medial aspect above the ankle. 2+ DP pulses. no step off.

## 2018-05-18 NOTE — SBIRT NOTE. - NSSBIRTSERVICES_GEN_A_ED_FT
Provided SBIRT services: Full screen Negative. Positive reinforcement provided given patient currently within healthy guidelines. Education materials reviewed and given to patient.  Audit Score: 3  DAST Score: 0  Duration = 15 Minutes  Naloxone Rescue Kit dispensed: Pt was educated about Naloxone and trained on how to assemble and utilize the kit. [id number]
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
## 2018-05-18 NOTE — ED PROVIDER NOTE - SKIN, MLM
Skin normal color for race, warm, dry and intact. No evidence of rash. Skin normal color for race, warm, dry and intact. slight redness to le as above

## 2018-05-18 NOTE — ED ADULT NURSE NOTE - OBJECTIVE STATEMENT
Receive pt intake, alert and oriented x 3 c/o L calf pain.  Denies dizziness, sob, chest pain.   IV placed. Labs sent.  EKG done.  Will continue to monitor
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
## 2018-05-18 NOTE — ED PROVIDER NOTE - MUSCULOSKELETAL [+], MLM
resolved b/l LE cramping, worsening (standing and walking) left calf TTP, left calf edema, left calf redness, and difficulty bearing weight/CALF PAIN

## 2018-05-18 NOTE — ED PROVIDER NOTE - PROGRESS NOTE DETAILS
Dr. Barrera's PA called back and has informed Dr. Barrera of pt situation. Will call back Dr. English's PA called back and has informed Dr. Barrera of pt situation. Will call back seen by Dr. Ashby and Dr. Cosme called back, agree with plan for CDU- pt with PE and DVT, hemodynamically stable accepted by CDU

## 2018-05-18 NOTE — ED CDU PROVIDER INITIAL DAY NOTE - PROGRESS NOTE DETAILS
Sign-out rec'd on this patient from CDU day RAMON Her and attending Dr. Poole; test results and orders reviewed.  In the interim, pt has been resting comfortably with no issues or c/o in the interim.  Will continue to monitor. Pt c/o generalized left sided headache; states hx/o migraines, states current symptoms consistent with hx/o usual migraine headaches, for which he takes Fioricet or Ultram.  Pt requesting dose of Fioricet for headache.  No neuro deficits.  Will continue to monitor.

## 2018-05-18 NOTE — ED PROVIDER NOTE - CHPI ED SYMPTOMS POS
resolved b/l LE cramping and SOB/JOINT SWELLING/INFLAMMATION/DIFFICULTY BEARING WEIGHT/EDEMA/PAIN/TENDERNESS

## 2018-05-18 NOTE — ED CDU PROVIDER INITIAL DAY NOTE - CHPI ED SYMPTOMS POS
PAIN/EDEMA/INFLAMMATION/JOINT SWELLING/DIFFICULTY BEARING WEIGHT/TENDERNESS/resolved b/l LE cramping and SOB

## 2018-05-18 NOTE — ED CDU PROVIDER INITIAL DAY NOTE - LOWER EXTREMITY EXAM, LEFT
Distal neurovascularly intact, mild erythema and warmth to the medial aspect above the ankle. 2+ DP pulses. no step off./TENDERNESS

## 2018-05-18 NOTE — ED ADULT TRIAGE NOTE - CHIEF COMPLAINT QUOTE
Pt c/o of left calf pain since last night pt states pain is worse with walking.  skin warm and dry pedal pulse is present.

## 2018-05-18 NOTE — ED ADULT NURSE REASSESSMENT NOTE - NS ED NURSE REASSESS COMMENT FT1
received the patient in rw from ct . a/o x3 complaining of left lower leg calf  pain and swelling . denies any chest pain and shortness off breathing . clinda given and Xarelto administered transferred back to [facility] as per md order for further moniter

## 2018-05-19 DIAGNOSIS — G43.909 MIGRAINE, UNSPECIFIED, NOT INTRACTABLE, WITHOUT STATUS MIGRAINOSUS: ICD-10-CM

## 2018-05-19 DIAGNOSIS — I26.99 OTHER PULMONARY EMBOLISM WITHOUT ACUTE COR PULMONALE: ICD-10-CM

## 2018-05-19 DIAGNOSIS — E11.9 TYPE 2 DIABETES MELLITUS WITHOUT COMPLICATIONS: ICD-10-CM

## 2018-05-19 DIAGNOSIS — D45 POLYCYTHEMIA VERA: ICD-10-CM

## 2018-05-19 DIAGNOSIS — R63.8 OTHER SYMPTOMS AND SIGNS CONCERNING FOOD AND FLUID INTAKE: ICD-10-CM

## 2018-05-19 DIAGNOSIS — Z29.9 ENCOUNTER FOR PROPHYLACTIC MEASURES, UNSPECIFIED: ICD-10-CM

## 2018-05-19 DIAGNOSIS — I10 ESSENTIAL (PRIMARY) HYPERTENSION: ICD-10-CM

## 2018-05-19 DIAGNOSIS — G43.109 MIGRAINE WITH AURA, NOT INTRACTABLE, WITHOUT STATUS MIGRAINOSUS: ICD-10-CM

## 2018-05-19 PROCEDURE — 70450 CT HEAD/BRAIN W/O DYE: CPT | Mod: 26

## 2018-05-19 PROCEDURE — 99223 1ST HOSP IP/OBS HIGH 75: CPT | Mod: GC

## 2018-05-19 RX ORDER — OXYCODONE AND ACETAMINOPHEN 5; 325 MG/1; MG/1
1 TABLET ORAL ONCE
Qty: 0 | Refills: 0 | Status: DISCONTINUED | OUTPATIENT
Start: 2018-05-19 | End: 2018-05-19

## 2018-05-19 RX ORDER — INSULIN LISPRO 100/ML
VIAL (ML) SUBCUTANEOUS AT BEDTIME
Qty: 0 | Refills: 0 | Status: DISCONTINUED | OUTPATIENT
Start: 2018-05-19 | End: 2018-05-21

## 2018-05-19 RX ORDER — METOCLOPRAMIDE HCL 10 MG
10 TABLET ORAL ONCE
Qty: 0 | Refills: 0 | Status: COMPLETED | OUTPATIENT
Start: 2018-05-19 | End: 2018-05-19

## 2018-05-19 RX ORDER — ACETAMINOPHEN 500 MG
650 TABLET ORAL EVERY 6 HOURS
Qty: 0 | Refills: 0 | Status: DISCONTINUED | OUTPATIENT
Start: 2018-05-19 | End: 2018-05-21

## 2018-05-19 RX ORDER — MAGNESIUM SULFATE 500 MG/ML
2 VIAL (ML) INJECTION ONCE
Qty: 0 | Refills: 0 | Status: COMPLETED | OUTPATIENT
Start: 2018-05-19 | End: 2018-05-19

## 2018-05-19 RX ORDER — RIVAROXABAN 15 MG-20MG
15 KIT ORAL
Qty: 0 | Refills: 0 | Status: DISCONTINUED | OUTPATIENT
Start: 2018-05-19 | End: 2018-05-21

## 2018-05-19 RX ORDER — SUMATRIPTAN SUCCINATE 4 MG/.5ML
25 INJECTION, SOLUTION SUBCUTANEOUS ONCE
Qty: 0 | Refills: 0 | Status: COMPLETED | OUTPATIENT
Start: 2018-05-19 | End: 2018-05-19

## 2018-05-19 RX ORDER — DEXLANSOPRAZOLE 30 MG/1
60 CAPSULE, DELAYED RELEASE ORAL DAILY
Qty: 0 | Refills: 0 | Status: DISCONTINUED | OUTPATIENT
Start: 2018-05-19 | End: 2018-05-21

## 2018-05-19 RX ORDER — VALPROIC ACID (AS SODIUM SALT) 250 MG/5ML
500 SOLUTION, ORAL ORAL ONCE
Qty: 0 | Refills: 0 | Status: COMPLETED | OUTPATIENT
Start: 2018-05-19 | End: 2018-05-19

## 2018-05-19 RX ORDER — TAMSULOSIN HYDROCHLORIDE 0.4 MG/1
0.4 CAPSULE ORAL AT BEDTIME
Qty: 0 | Refills: 0 | Status: DISCONTINUED | OUTPATIENT
Start: 2018-05-19 | End: 2018-05-21

## 2018-05-19 RX ORDER — OXYCODONE AND ACETAMINOPHEN 5; 325 MG/1; MG/1
1 TABLET ORAL EVERY 6 HOURS
Qty: 0 | Refills: 0 | Status: DISCONTINUED | OUTPATIENT
Start: 2018-05-19 | End: 2018-05-21

## 2018-05-19 RX ORDER — INSULIN LISPRO 100/ML
VIAL (ML) SUBCUTANEOUS
Qty: 0 | Refills: 0 | Status: DISCONTINUED | OUTPATIENT
Start: 2018-05-19 | End: 2018-05-21

## 2018-05-19 RX ADMIN — TAMSULOSIN HYDROCHLORIDE 0.4 MILLIGRAM(S): 0.4 CAPSULE ORAL at 22:05

## 2018-05-19 RX ADMIN — OXYCODONE AND ACETAMINOPHEN 1 TABLET(S): 5; 325 TABLET ORAL at 19:53

## 2018-05-19 RX ADMIN — RIVAROXABAN 15 MILLIGRAM(S): KIT at 19:08

## 2018-05-19 RX ADMIN — Medication 1 TABLET(S): at 00:00

## 2018-05-19 RX ADMIN — OXYCODONE AND ACETAMINOPHEN 1 TABLET(S): 5; 325 TABLET ORAL at 19:12

## 2018-05-19 RX ADMIN — OXYCODONE AND ACETAMINOPHEN 1 TABLET(S): 5; 325 TABLET ORAL at 10:45

## 2018-05-19 RX ADMIN — NEBIVOLOL HYDROCHLORIDE 10 MILLIGRAM(S): 5 TABLET ORAL at 05:58

## 2018-05-19 RX ADMIN — Medication 1 TABLET(S): at 06:16

## 2018-05-19 RX ADMIN — RIVAROXABAN 15 MILLIGRAM(S): KIT at 05:58

## 2018-05-19 RX ADMIN — Medication 165 MILLIGRAM(S): at 07:59

## 2018-05-19 RX ADMIN — Medication 50 GRAM(S): at 06:46

## 2018-05-19 RX ADMIN — OXYCODONE AND ACETAMINOPHEN 1 TABLET(S): 5; 325 TABLET ORAL at 10:02

## 2018-05-19 RX ADMIN — Medication 1 TABLET(S): at 00:15

## 2018-05-19 RX ADMIN — SUMATRIPTAN SUCCINATE 25 MILLIGRAM(S): 4 INJECTION, SOLUTION SUBCUTANEOUS at 18:00

## 2018-05-19 RX ADMIN — Medication 2 MILLIGRAM(S): at 10:02

## 2018-05-19 RX ADMIN — SUMATRIPTAN SUCCINATE 25 MILLIGRAM(S): 4 INJECTION, SOLUTION SUBCUTANEOUS at 19:00

## 2018-05-19 RX ADMIN — Medication 10 MILLIGRAM(S): at 00:55

## 2018-05-19 NOTE — H&P ADULT - PROBLEM SELECTOR PLAN 1
Anesthetic History   No history of anesthetic complications            Review of Systems / Medical History  Patient summary reviewed, nursing notes reviewed and pertinent labs reviewed    Pulmonary        Sleep apnea           Neuro/Psych         Psychiatric history     Cardiovascular    Hypertension                   GI/Hepatic/Renal     GERD           Endo/Other    Diabetes  Hypothyroidism  Morbid obesity and arthritis     Other Findings              Physical Exam    Airway  Mallampati: II  TM Distance: > 6 cm  Neck ROM: normal range of motion   Mouth opening: Normal     Cardiovascular  Regular rate and rhythm,  S1 and S2 normal,  no murmur, click, rub, or gallop             Dental  No notable dental hx       Pulmonary  Breath sounds clear to auscultation               Abdominal  GI exam deferred       Other Findings            Anesthetic Plan    ASA: 2  Anesthesia type: general          Induction: Intravenous  Anesthetic plan and risks discussed with: Patient Patient is hemodynamically stable w/o evidence of right heart strain on echocardiogram. He is currently on full anticoagulation with rivaroxaban twice daily. He is not in any respiratory distress and is saturating well on room air.   -Continue with Xarelto 15 BID  -No significant provoking factor at this time.   -Patient is up to date with age related cancer screening.   -May require phlebotomy to maintain hematocrit <45 in the setting acute thrombotic event.

## 2018-05-19 NOTE — ED CDU PROVIDER SUBSEQUENT DAY NOTE - MUSCULOSKELETAL, MLM
Range of motion is not limited, bilateral distal LE generalized tenderness.  Gait independent and stable but with significant limping noted.

## 2018-05-19 NOTE — H&P ADULT - ASSESSMENT
48 y/o M hx lumbar radiculopathy, T2DM, [place] 2 negative polycythemia vera, migraine, hypertension and recent diagnosis of vertigo presents with left lower extremity cramping and pain secondary to LLE DVT c/b extensive b/l PE.

## 2018-05-19 NOTE — H&P ADULT - NSHPPHYSICALEXAM_GEN_ALL_CORE
GENERAL: No acute distress, well-developed  HEAD:  Atraumatic, Normocephalic  ENT: EOMI, PERRLA, conjunctiva and sclera clear, Neck supple, No JVD, moist mucosa, no pharyngeal erythema, no tonsillar enlargement or exudate  CHEST/LUNG: Clear to auscultation bilaterally; No wheeze, equal breath sounds bilaterally   HEART: Regular rate and rhythm; No murmurs, rubs, or gallops  ABDOMEN: Soft, Nontender, Nondistended; Bowel sounds present, no organomegaly  EXTREMITIES:  Left calf edema  PSYCH: Nl behavior, nl affect  NEUROLOGY: AAOx3, non-focal, cranial nerves intact  SKIN: Normal color, No rashes or lesions Vital Signs Last 24 Hrs  T(C): 36.9 (19 May 2018 13:18), Max: 37.3 (19 May 2018 01:03)  T(F): 98.5 (19 May 2018 13:18), Max: 99.1 (19 May 2018 01:03)  HR: 79 (19 May 2018 13:18) (74 - 84)  BP: 112/83 (19 May 2018 13:18) (112/83 - 153/94)  BP(mean): --  RR: 17 (19 May 2018 13:18) (15 - 18)  SpO2: 97% (19 May 2018 13:18) (96% - 100%)    GENERAL: No acute distress, well-developed  HEAD:  Atraumatic, Normocephalic  ENT: EOMI, PERRLA, conjunctiva and sclera clear, Neck supple, No JVD, moist mucosa, no pharyngeal erythema, no tonsillar enlargement or exudate  CHEST/LUNG: Clear to auscultation bilaterally; No wheeze, equal breath sounds bilaterally   HEART: Regular rate and rhythm; No murmurs, rubs, or gallops  ABDOMEN: Soft, Nontender, Nondistended; Bowel sounds present, no organomegaly  EXTREMITIES:  Left calf edema  PSYCH: Nl behavior, nl affect  NEUROLOGY: AAOx3, non-focal, cranial nerves intact  SKIN: Normal color, No rashes or lesions

## 2018-05-19 NOTE — ED CDU PROVIDER SUBSEQUENT DAY NOTE - MUSCULOSKELETAL [+], MLM
CALF PAIN/b/l LE cramping, worsening (standing and walking) left calf TTP, left calf edema, left calf redness, and difficulty bearing weight

## 2018-05-19 NOTE — H&P ADULT - PROBLEM SELECTOR PLAN 5
Currently, headache is resolved. Suspect headache with in the setting of patient's known migraine d/o. Follow up with neurology regarding necessity of MRV  -Continue with tramadol 300 qhs per home dose  -Continue with Lyrica 50 mg BID

## 2018-05-19 NOTE — ED CDU PROVIDER SUBSEQUENT DAY NOTE - MEDICAL DECISION MAKING DETAILS
Del: PCV. L ankle pain w/o trauma/F. Found to have DVT and PEs. Pain so severe he can't bear weight on L leg. Started Xarelto. Admit for pain control, Heme consideration of treatment for PCV, given this is the likely causative factor for his VTE (no trauma or immobilization).

## 2018-05-19 NOTE — CONSULT NOTE ADULT - SUBJECTIVE AND OBJECTIVE BOX
Neurology Consult    Name  EGENA ROSALES    47 year old male with headache. Patient has had migraines his whole life since cranial bone surgery as infant and worsening with a few injuries throughout his life. Pain is described as holocephalic that moves and is preceded by an aura of floaters. Accompanied by photophobia and phonophobia. No nausea. Takes tramadol and fiorocet. Sees neurologist on [facility]. Patient is currently being treated for bilateral DVTs that were diagnosed during the ED visit when he presented with bilateral leg cramping. Will be started on oral anticoagulation. He initially attributed the cramping to his radiculopathy. Headache is currently similar to migraines that he has had. Also had vertigo that he is currently doing vestibular rehab for.   In the ED, patient received reglan, toradol, fiorocet, and percocet with little subjective relief however patient noted to be sleeping comfortably after each dose.                                                  PAST MEDICAL & SURGICAL HISTORY:  DM (diabetes mellitus)  Vertigo  Hypertension  Polycythemia vera  History of lumbar spinal fusion  H/O bilateral inguinal hernia repair          MEDICATIONS  (STANDING):  glimepiride 2 milliGRAM(s) Oral with breakfast  nebivolol 10 milliGRAM(s) Oral daily  pregabalin 50 milliGRAM(s) Oral at bedtime  valproate sodium IVPB 500 milliGRAM(s) IV Intermittent once    MEDICATIONS  (PRN):      Allergies    No Known Allergies    Intolerances        Objective  Vital Signs Last 24 Hrs  T(C): 36.9 (19 May 2018 05:56), Max: 37.3 (19 May 2018 01:03)  T(F): 98.4 (19 May 2018 05:56), Max: 99.1 (19 May 2018 01:03)  HR: 74 (19 May 2018 05:56) (67 - 98)  BP: 138/94 (19 May 2018 05:56) (125/73 - 160/99)  BP(mean): --  RR: 17 (19 May 2018 05:56) (14 - 18)  SpO2: 97% (19 May 2018 05:56) (96% - 100%)    General Exam   General appearance: No acute distress, well-nourished  Respiratory:    non-labored respirations               Neurological Exam  Mental Status:  alert and oriented x3, fluent speech, following commands, repetition and naming intact    Cranial Nerves: PERRL, EOMI without nystagmus, visual fields intact, no facial droop, no dysarthria    Motor:   Tone:   normal               Strength:  Upper extremity                          Delt       Bicep    Tricep                                                  R         5/5        5/5        5/5       5/5                                               L          5/5        5/5        5/5      5/5    Lower extremity                           HF               DF         PF                                               R        5/5        5/5       5/5                                               L         5/5       5/5        5/5    Pronator drift:   none           Dysmetria: none with finger-to-nose testing  Tremor:  none appreciated at rest or in action    Sensation: intact grossly to light touch      Toes flexor bilaterally mute    Gait:     Other Studies                          15.1   11.24 )-----------( 238      ( 18 May 2018 10:04 )             45.0     05-18    141  |  102  |  10  ----------------------------<  155<H>  3.7   |  27  |  0.83    Ca    9.2      18 May 2018 10:04    TPro  6.8  /  Alb  4.0  /  TBili  0.7  /  DBili  x   /  AST  21  /  ALT  33  /  AlkPhos  64  05-18    LIVER FUNCTIONS - ( 18 May 2018 10:04 )  Alb: 4.0 g/dL / Pro: 6.8 g/dL / ALK PHOS: 64 u/L / ALT: 33 u/L / AST: 21 u/L / GGT: x             Radiology    CTh  < from: CT Head No Cont (05.19.18 @ 03:19) >  IMPRESSION:     No acute intracranial bleeding, mass effect, or shift.     Small right maxillary sinus fluid level may reflect acute sinusitis in   the appropriate clinical setting.          < end of copied text >
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
## 2018-05-19 NOTE — ED CDU PROVIDER SUBSEQUENT DAY NOTE - ATTENDING CONTRIBUTION TO CARE
I performed a face-to-face evaluation of the patient and performed a history and physical examination. I agree with the history and physical examination.    Del: PCV. L ankle pain w/o trauma/F. Found to have DVT and PEs. Pain so severe he can't bear weight on L leg. Started Xarelto. Admit for pain control, Heme consideration of treatment for PCV, given this is the likely causative factor for his VTE (no trauma or immobilization).

## 2018-05-19 NOTE — H&P ADULT - NSHPLABSRESULTS_GEN_ALL_CORE
15.1   11.24 )-----------( 238      ( 18 May 2018 10:04 )             45.0       05-18    141  |  102  |  10  ----------------------------<  155<H>  3.7   |  27  |  0.83    Ca    9.2      18 May 2018 10:04    TPro  6.8  /  Alb  4.0  /  TBili  0.7  /  DBili  x   /  AST  21  /  ALT  33  /  AlkPhos  64  05-18                  PT/INR - ( 18 May 2018 10:04 )   PT: 11.9 SEC;   INR: 1.03          PTT - ( 18 May 2018 10:04 )  PTT:30.0 SEC    Lactate Trend      CARDIAC MARKERS ( 18 May 2018 17:30 )  x     / < 0.06 ng/mL / x     / x     / x      CARDIAC MARKERS ( 18 May 2018 10:04 )  x     / < 0.06 ng/mL / x     / x     / x            CAPILLARY BLOOD GLUCOSE      POCT Blood Glucose.: 187 mg/dL (19 May 2018 12:31)

## 2018-05-19 NOTE — ED CDU PROVIDER SUBSEQUENT DAY NOTE - PMH
DM (diabetes mellitus)    Hypertension    Polycythemia vera    Vertigo DM (diabetes mellitus)    Hypertension    Migraine    Polycythemia vera    Vertigo

## 2018-05-19 NOTE — ED CDU PROVIDER DISPOSITION NOTE - CLINICAL COURSE
Del: Del: PCV. L ankle pain w/o trauma/F. Found to have DVT and PEs. Pain so severe he can't bear weight on L leg. Started Xarelto. Admit for pain control, Heme consideration of treatment for PCV, given this is the likely causative factor for his VTE (no trauma or immobilization). Pt. has had difficult-to-treat HA in CDU: check MRV brain for CVT as additional location of clotting.

## 2018-05-19 NOTE — ED CDU PROVIDER SUBSEQUENT DAY NOTE - NOTES
Case discussed with covering Neuro resident at 0610 hrs; informed that Fioricet currently ordered.  Neuro resident advises to give Magnesium 2 grams IV and Depacon 500 mg IV over 20 minutes for headache now (in addition to Fioricet) and will come to eval pt.

## 2018-05-19 NOTE — H&P ADULT - PROBLEM SELECTOR PLAN 2
Patient follows with Dr. Cosme at [facility]. He has required rare phlebotomies; he reports none recently. May follow up with hematology regarding current management in the setting of DVT/PE in secondary P Vera patient.  -May require therapeutic phlebotomy to maintain hematocrit <45  -No thromboycytosis

## 2018-05-20 DIAGNOSIS — D75.1 SECONDARY POLYCYTHEMIA: ICD-10-CM

## 2018-05-20 DIAGNOSIS — I82.409 ACUTE EMBOLISM AND THROMBOSIS OF UNSPECIFIED DEEP VEINS OF UNSPECIFIED LOWER EXTREMITY: ICD-10-CM

## 2018-05-20 LAB
ALBUMIN SERPL ELPH-MCNC: 3.6 G/DL — SIGNIFICANT CHANGE UP (ref 3.3–5)
ALP SERPL-CCNC: 62 U/L — SIGNIFICANT CHANGE UP (ref 40–120)
ALT FLD-CCNC: 25 U/L — SIGNIFICANT CHANGE UP (ref 4–41)
APTT BLD: 36.5 SEC — SIGNIFICANT CHANGE UP (ref 27.5–37.4)
AST SERPL-CCNC: 16 U/L — SIGNIFICANT CHANGE UP (ref 4–40)
BASOPHILS # BLD AUTO: 0.04 K/UL — SIGNIFICANT CHANGE UP (ref 0–0.2)
BASOPHILS NFR BLD AUTO: 0.4 % — SIGNIFICANT CHANGE UP (ref 0–2)
BILIRUB SERPL-MCNC: 0.4 MG/DL — SIGNIFICANT CHANGE UP (ref 0.2–1.2)
BUN SERPL-MCNC: 9 MG/DL — SIGNIFICANT CHANGE UP (ref 7–23)
CALCIUM SERPL-MCNC: 8.8 MG/DL — SIGNIFICANT CHANGE UP (ref 8.4–10.5)
CHLORIDE SERPL-SCNC: 104 MMOL/L — SIGNIFICANT CHANGE UP (ref 98–107)
CO2 SERPL-SCNC: 21 MMOL/L — LOW (ref 22–31)
CREAT SERPL-MCNC: 0.92 MG/DL — SIGNIFICANT CHANGE UP (ref 0.5–1.3)
EOSINOPHIL # BLD AUTO: 0.11 K/UL — SIGNIFICANT CHANGE UP (ref 0–0.5)
EOSINOPHIL NFR BLD AUTO: 1 % — SIGNIFICANT CHANGE UP (ref 0–6)
GLUCOSE SERPL-MCNC: 135 MG/DL — HIGH (ref 70–99)
HCT VFR BLD CALC: 46.3 % — SIGNIFICANT CHANGE UP (ref 39–50)
HGB BLD-MCNC: 15.4 G/DL — SIGNIFICANT CHANGE UP (ref 13–17)
IMM GRANULOCYTES # BLD AUTO: 0.07 # — SIGNIFICANT CHANGE UP
IMM GRANULOCYTES NFR BLD AUTO: 0.6 % — SIGNIFICANT CHANGE UP (ref 0–1.5)
INR BLD: 1.2 — HIGH (ref 0.88–1.17)
LYMPHOCYTES # BLD AUTO: 29.7 % — SIGNIFICANT CHANGE UP (ref 13–44)
LYMPHOCYTES # BLD AUTO: 3.27 K/UL — SIGNIFICANT CHANGE UP (ref 1–3.3)
MAGNESIUM SERPL-MCNC: 2.5 MG/DL — SIGNIFICANT CHANGE UP (ref 1.6–2.6)
MCHC RBC-ENTMCNC: 27.1 PG — SIGNIFICANT CHANGE UP (ref 27–34)
MCHC RBC-ENTMCNC: 33.3 % — SIGNIFICANT CHANGE UP (ref 32–36)
MCV RBC AUTO: 81.4 FL — SIGNIFICANT CHANGE UP (ref 80–100)
MONOCYTES # BLD AUTO: 0.95 K/UL — HIGH (ref 0–0.9)
MONOCYTES NFR BLD AUTO: 8.6 % — SIGNIFICANT CHANGE UP (ref 2–14)
NEUTROPHILS # BLD AUTO: 6.56 K/UL — SIGNIFICANT CHANGE UP (ref 1.8–7.4)
NEUTROPHILS NFR BLD AUTO: 59.7 % — SIGNIFICANT CHANGE UP (ref 43–77)
NRBC # FLD: 0 — SIGNIFICANT CHANGE UP
PHOSPHATE SERPL-MCNC: 2.8 MG/DL — SIGNIFICANT CHANGE UP (ref 2.5–4.5)
PLATELET # BLD AUTO: 255 K/UL — SIGNIFICANT CHANGE UP (ref 150–400)
PMV BLD: 9 FL — SIGNIFICANT CHANGE UP (ref 7–13)
POTASSIUM SERPL-MCNC: 3.9 MMOL/L — SIGNIFICANT CHANGE UP (ref 3.5–5.3)
POTASSIUM SERPL-SCNC: 3.9 MMOL/L — SIGNIFICANT CHANGE UP (ref 3.5–5.3)
PROT SERPL-MCNC: 6.7 G/DL — SIGNIFICANT CHANGE UP (ref 6–8.3)
PROTHROM AB SERPL-ACNC: 13.8 SEC — HIGH (ref 9.8–13.1)
RBC # BLD: 5.69 M/UL — SIGNIFICANT CHANGE UP (ref 4.2–5.8)
RBC # FLD: 16.8 % — HIGH (ref 10.3–14.5)
SODIUM SERPL-SCNC: 139 MMOL/L — SIGNIFICANT CHANGE UP (ref 135–145)
WBC # BLD: 11 K/UL — HIGH (ref 3.8–10.5)
WBC # FLD AUTO: 11 K/UL — HIGH (ref 3.8–10.5)

## 2018-05-20 PROCEDURE — 99233 SBSQ HOSP IP/OBS HIGH 50: CPT | Mod: GC

## 2018-05-20 RX ORDER — HYDROXYUREA 500 MG/1
1000 CAPSULE ORAL
Qty: 0 | Refills: 0 | Status: DISCONTINUED | OUTPATIENT
Start: 2018-05-20 | End: 2018-05-21

## 2018-05-20 RX ORDER — ASPIRIN/CALCIUM CARB/MAGNESIUM 324 MG
81 TABLET ORAL DAILY
Qty: 0 | Refills: 0 | Status: DISCONTINUED | OUTPATIENT
Start: 2018-05-20 | End: 2018-05-21

## 2018-05-20 RX ORDER — SUMATRIPTAN SUCCINATE 4 MG/.5ML
25 INJECTION, SOLUTION SUBCUTANEOUS ONCE
Qty: 0 | Refills: 0 | Status: COMPLETED | OUTPATIENT
Start: 2018-05-20 | End: 2018-05-20

## 2018-05-20 RX ORDER — SODIUM CHLORIDE 9 MG/ML
1000 INJECTION INTRAMUSCULAR; INTRAVENOUS; SUBCUTANEOUS
Qty: 0 | Refills: 0 | Status: DISCONTINUED | OUTPATIENT
Start: 2018-05-20 | End: 2018-05-21

## 2018-05-20 RX ADMIN — Medication 1: at 18:59

## 2018-05-20 RX ADMIN — OXYCODONE AND ACETAMINOPHEN 1 TABLET(S): 5; 325 TABLET ORAL at 15:38

## 2018-05-20 RX ADMIN — Medication 81 MILLIGRAM(S): at 17:54

## 2018-05-20 RX ADMIN — OXYCODONE AND ACETAMINOPHEN 1 TABLET(S): 5; 325 TABLET ORAL at 16:30

## 2018-05-20 RX ADMIN — NEBIVOLOL HYDROCHLORIDE 10 MILLIGRAM(S): 5 TABLET ORAL at 05:39

## 2018-05-20 RX ADMIN — RIVAROXABAN 15 MILLIGRAM(S): KIT at 17:54

## 2018-05-20 RX ADMIN — HYDROXYUREA 1000 MILLIGRAM(S): 500 CAPSULE ORAL at 17:54

## 2018-05-20 RX ADMIN — OXYCODONE AND ACETAMINOPHEN 1 TABLET(S): 5; 325 TABLET ORAL at 05:37

## 2018-05-20 RX ADMIN — SUMATRIPTAN SUCCINATE 25 MILLIGRAM(S): 4 INJECTION, SOLUTION SUBCUTANEOUS at 06:20

## 2018-05-20 RX ADMIN — DEXLANSOPRAZOLE 60 MILLIGRAM(S): 30 CAPSULE, DELAYED RELEASE ORAL at 11:18

## 2018-05-20 RX ADMIN — RIVAROXABAN 15 MILLIGRAM(S): KIT at 05:38

## 2018-05-20 RX ADMIN — SODIUM CHLORIDE 125 MILLILITER(S): 9 INJECTION INTRAMUSCULAR; INTRAVENOUS; SUBCUTANEOUS at 21:47

## 2018-05-20 RX ADMIN — TAMSULOSIN HYDROCHLORIDE 0.4 MILLIGRAM(S): 0.4 CAPSULE ORAL at 21:47

## 2018-05-20 RX ADMIN — SODIUM CHLORIDE 125 MILLILITER(S): 9 INJECTION INTRAMUSCULAR; INTRAVENOUS; SUBCUTANEOUS at 15:32

## 2018-05-20 RX ADMIN — SUMATRIPTAN SUCCINATE 25 MILLIGRAM(S): 4 INJECTION, SOLUTION SUBCUTANEOUS at 07:30

## 2018-05-20 RX ADMIN — OXYCODONE AND ACETAMINOPHEN 1 TABLET(S): 5; 325 TABLET ORAL at 06:20

## 2018-05-20 NOTE — PROGRESS NOTE ADULT - PROBLEM SELECTOR PLAN 1
VS stable overnight, no respiratory distress and is saturating well on room air.   - TTE: no evidence of heart strain on echo  -No significant provoking factor at this time.   -Continue with Xarelto 15 BID

## 2018-05-20 NOTE — CONSULT NOTE ADULT - ATTENDING COMMENTS
Patient seen and examined agree with above.  Continue medications to help migraine
I discussed the case  the fellow and agree with the above.  Diana Fuller MD

## 2018-05-20 NOTE — PROGRESS NOTE ADULT - SUBJECTIVE AND OBJECTIVE BOX
Neurology Progress    GEENA [id number]    HPI:  48 y/o M hx lumbar radiculopathy, T2DM, [place] 2 negative polycythemia vera, migraine, hypertension and recent diagnosis of vertigo presents with left lower extremity cramping and pain.     Patient reports that one day prior to presentation he had gradual onset of left sided calf pain, redness, and edema. He began to have difficulty bearing weight prompting him to present to ER. Work up including lower extremity ultrasound revealing below the knee left sided DVT. CTA showed bilateral pulmonary embolism without right heart strain. He was admitted to the CDU for further workup and management. Transthoracic echocardiogram showed normal left ventricular systolic function and grossly normal right ventricular systolic function. He was evaluated by cardiology and started on rivaroxaban for treatment of acute thrombus.     Overnight, the patient developed severe headache described as one of the worst headaches of his life. The patient reported that it was reminiscent of previous migraines but worse in intensity. Urgent CTH at that time showed small right sided maxillary sinus fluid level. He was treated with Fioricet magnesium, Toradol valproate acid and percocet with improvement in his pain. Presently, the headache is resolved. He denies any photophobia or phonophobia presently. Denies weakness in upper or lower extremities. Denies difficulty with speech or swallow. The patient was evaluated by neurology who did not recommend additional imaging.     In reference to polycythemia vera, the patient follows at [facility] with Dr. Cosme. He reports that he is treated with intermittent phlebotomy, usually about twice yearly, though he is unsure of last time he was phlebotomized.     Of note, the patient was admitted to CDU on 5/10 with headache and dizziness. At that time, he was also evaluated by neurology. On examination, he had had horizontal nystagmus and given questionable history of TBI and cranioplasty, he was recommended to rule out secondary cause with MRI. The patient was unable to tolerate MRI secondary to claustrophobia and he instead completed a CTA head and neck, which was unrevealing. (19 May 2018 13:11)      Past Medical History  Migraine  DM (diabetes mellitus)  Vertigo  Hypertension  Polycythemia vera      Past Surgical History  History of lumbar spinal fusion  H/O bilateral inguinal hernia repair      MEDICATIONS    acetaminophen   Tablet. 650 milliGRAM(s) Oral every 6 hours PRN  dexlansoprazole DR 60 milliGRAM(s) Oral daily  insulin lispro (HumaLOG) corrective regimen sliding scale   SubCutaneous three times a day before meals  insulin lispro (HumaLOG) corrective regimen sliding scale   SubCutaneous at bedtime  nebivolol 10 milliGRAM(s) Oral daily  oxyCODONE    5 mG/acetaminophen 325 mG 1 Tablet(s) Oral every 6 hours PRN  pregabalin 50 milliGRAM(s) Oral at bedtime  rivaroxaban 15 milliGRAM(s) Oral two times a day  tamsulosin 0.4 milliGRAM(s) Oral at bedtime  Topiramate ER Capsules 100 milliGRAM(s) 100 milliGRAM(s) Oral daily         Family history: No history of dementia, strokes, or seizures   FAMILY HISTORY:    SOCIAL HISTORY -- No history of tobacco or alcohol use     Allergies    No Known Allergies    Intolerances        Height (cm): 175.26 (05-19 @ 13:18)  Weight (kg): 98 (05-19 @ 13:18)  BMI (kg/m2): 31.9 (05-19 @ 13:18)    Vital Signs Last 24 Hrs  T(C): 36.2 (20 May 2018 05:55), Max: 37 (19 May 2018 21:06)  T(F): 97.2 (20 May 2018 05:55), Max: 98.6 (19 May 2018 21:06)  HR: 84 (20 May 2018 05:55) (79 - 90)  BP: 118/82 (20 May 2018 05:55) (112/83 - 143/85)  BP(mean): --  RR: 18 (20 May 2018 05:55) (16 - 18)  SpO2: 97% (20 May 2018 05:55) (96% - 98%)        On Neurological Examination:    Mental Status - Patient is alert, awake, oriented X3. .   Follows commands well and able to answer questions appropriately. Mood and affect  normal  Follow simple commands able to repeat  able to name.  Speech - Fluent no Dysarthria  no  Aphasia                              Cranial Nerves - Extraocular muscle intact  WINDY Facial symmetry Tongue midline, CnV1to V3 intact gross hearing intact      Motor Exam -   Right upper  5/5 throughout  Left upper 5/5 throughtout  Right lower- 5/5 throughout  Left lower 5/5 throughout  Coordination -finger to nose intact  Muscle tone - is normal all over. No asymmetry is seen.      Sensory    Bilateral intact to light touch    Gait -  normal  no ataxia     GENERAL Exam:     Nontoxic , No Acute Distress   	  HEENT:  normocephalic, atraumatic  		  LUNGS:	Clear bilaterally  No Wheeze      VASCULAR: no carotid brui  	  HEART:	 Normal S1S2   No murmur RRR        	  MUSCULOSKELETAL: Normal Range of Motion  	   SKIN:      Normal   No Ecchymosis               LABS:  CBC Full  -  ( 20 May 2018 05:38 )  WBC Count : 11.00 K/uL  Hemoglobin : 15.4 g/dL  Hematocrit : 46.3 %  Platelet Count - Automated : 255 K/uL  Mean Cell Volume : 81.4 fL  Mean Cell Hemoglobin : 27.1 pg  Mean Cell Hemoglobin Concentration : 33.3 %  Auto Neutrophil # : 6.56 K/uL  Auto Lymphocyte # : 3.27 K/uL  Auto Monocyte # : 0.95 K/uL  Auto Eosinophil # : 0.11 K/uL  Auto Basophil # : 0.04 K/uL  Auto Neutrophil % : 59.7 %  Auto Lymphocyte % : 29.7 %  Auto Monocyte % : 8.6 %  Auto Eosinophil % : 1.0 %  Auto Basophil % : 0.4 %      05-20    139  |  104  |  9   ----------------------------<  135<H>  3.9   |  21<L>  |  0.92    Ca    8.8      20 May 2018 05:38  Phos  2.8     05-20  Mg     2.5     05-20    TPro  6.7  /  Alb  3.6  /  TBili  0.4  /  DBili  x   /  AST  16  /  ALT  25  /  AlkPhos  62  05-20    Hemoglobin A1C:     LIVER FUNCTIONS - ( 20 May 2018 05:38 )  Alb: 3.6 g/dL / Pro: 6.7 g/dL / ALK PHOS: 62 u/L / ALT: 25 u/L / AST: 16 u/L / GGT: x           Vitamin B12   PT/INR - ( 20 May 2018 05:38 )   PT: 13.8 SEC;   INR: 1.20          PTT - ( 20 May 2018 05:38 )  PTT:36.5 SEC      RADIOLOGY    EKG      I          schoenberg

## 2018-05-20 NOTE — PROGRESS NOTE ADULT - ASSESSMENT
48 y/o M hx lumbar radiculopathy, T2DM, [place] 2 negative polycythemia vera, migraine, hypertension and recent diagnosis of vertigo presents with left lower extremity cramping and pain secondary to LLE DVT c/b extensive b/l PE. 48 y/o M hx lumbar radiculopathy, T2DM, [place] 2 negative polycythemia vera, migraine, hypertension and recent diagnosis of vertigo presents with left lower extremity cramping and pain secondary to LLE DVT c/b extensive b/l PE. No R heart strain noted

## 2018-05-20 NOTE — PROGRESS NOTE ADULT - SUBJECTIVE AND OBJECTIVE BOX
Patient is a 47y old  Male who presents with a chief complaint of Left leg pain (19 May 2018 13:11)      SUBJECTIVE / OVERNIGHT EVENTS:  No overnight events. Migraine overnight. LE pain has improved but patient continues to have mild discomfort. No shortness of breath or chest pain.     MEDICATIONS  (STANDING):  dexlansoprazole DR 60 milliGRAM(s) Oral daily  insulin lispro (HumaLOG) corrective regimen sliding scale   SubCutaneous three times a day before meals  insulin lispro (HumaLOG) corrective regimen sliding scale   SubCutaneous at bedtime  nebivolol 10 milliGRAM(s) Oral daily  pregabalin 50 milliGRAM(s) Oral at bedtime  rivaroxaban 15 milliGRAM(s) Oral two times a day  tamsulosin 0.4 milliGRAM(s) Oral at bedtime  Topiramate ER Capsules 100 milliGRAM(s) 100 milliGRAM(s) Oral daily    MEDICATIONS  (PRN):  acetaminophen   Tablet. 650 milliGRAM(s) Oral every 6 hours PRN Mild Pain (1 - 3)  oxyCODONE    5 mG/acetaminophen 325 mG 1 Tablet(s) Oral every 6 hours PRN Moderate to Severe Pain      CAPILLARY BLOOD GLUCOSE      POCT Blood Glucose.: 105 mg/dL (20 May 2018 08:25)  POCT Blood Glucose.: 127 mg/dL (20 May 2018 06:08)  POCT Blood Glucose.: 212 mg/dL (19 May 2018 22:18)  POCT Blood Glucose.: 121 mg/dL (19 May 2018 18:17)  POCT Blood Glucose.: 187 mg/dL (19 May 2018 12:31)    I&O's Summary      PHYSICAL EXAM:  GENERAL: NAD, well-developed  HEAD:  Atraumatic, Normocephalic  EYES: EOMI, PERRLA, conjunctiva and sclera clear  NECK: Supple, No JVD  CHEST/LUNG: Clear to auscultation bilaterally; No wheeze  HEART: Regular rate and rhythm; No murmurs, rubs, or gallops  ABDOMEN: Soft, Nontender, Nondistended; Bowel sounds present  EXTREMITIES:  2+ Peripheral Pulses. No erythema on LE or swelling. Calf equal in circumference  PSYCH: AAOx3  NEUROLOGY: non-focal  SKIN: No rashes or lesions    LABS:                        15.4   11.00 )-----------( 255      ( 20 May 2018 05:38 )             46.3     Auto Eosinophil # 0.11  / Auto Eosinophil % 1.0   / Auto Neutrophil # 6.56  / Auto Neutrophil % 59.7  / BANDS % x                            15.1   11.24 )-----------( 238      ( 18 May 2018 10:04 )             45.0     Auto Eosinophil # 0.07  / Auto Eosinophil % 0.6   / Auto Neutrophil # 7.99  / Auto Neutrophil % 71.1  / BANDS % x        05-20    139  |  104  |  9   ----------------------------<  135<H>  3.9   |  21<L>  |  0.92  05-18    141  |  102  |  10  ----------------------------<  155<H>  3.7   |  27  |  0.83    Ca    8.8      20 May 2018 05:38  Mg     2.5     05-20  Phos  2.8     05-20  TPro  6.7  /  Alb  3.6  /  TBili  0.4  /  DBili  x   /  AST  16  /  ALT  25  /  AlkPhos  62  05-20  TPro  6.8  /  Alb  4.0  /  TBili  0.7  /  DBili  x   /  AST  21  /  ALT  33  /  AlkPhos  64  05-18    PT/INR - ( 20 May 2018 05:38 )   PT: 13.8 SEC;   INR: 1.20          PTT - ( 20 May 2018 05:38 )  PTT:36.5 SEC  CARDIAC MARKERS ( 18 May 2018 17:30 )  x     / < 0.06 ng/mL / x     / x     / x      CARDIAC MARKERS ( 18 May 2018 10:04 )  x     / < 0.06 ng/mL / x     / x     / x                RESPIRATORY  VENT:    ABG:     VBG:     RADIOLOGY & ADDITIONAL TESTS:    Imaging Personally Reviewed:    Consultant(s) Notes Reviewed:      Care Discussed with Consultants/Other Providers: Patient is a 47y old  Male who presents with a chief complaint of Left leg pain (19 May 2018 13:11)      SUBJECTIVE / OVERNIGHT EVENTS:  No overnight events. Migraine overnight. LE pain has improved but patient continues to have mild discomfort. No shortness of breath or chest pain.     MEDICATIONS  (STANDING):  dexlansoprazole DR 60 milliGRAM(s) Oral daily  insulin lispro (HumaLOG) corrective regimen sliding scale   SubCutaneous three times a day before meals  insulin lispro (HumaLOG) corrective regimen sliding scale   SubCutaneous at bedtime  nebivolol 10 milliGRAM(s) Oral daily  pregabalin 50 milliGRAM(s) Oral at bedtime  rivaroxaban 15 milliGRAM(s) Oral two times a day  tamsulosin 0.4 milliGRAM(s) Oral at bedtime  Topiramate ER Capsules 100 milliGRAM(s) 100 milliGRAM(s) Oral daily    MEDICATIONS  (PRN):  acetaminophen   Tablet. 650 milliGRAM(s) Oral every 6 hours PRN Mild Pain (1 - 3)  oxyCODONE    5 mG/acetaminophen 325 mG 1 Tablet(s) Oral every 6 hours PRN Moderate to Severe Pain      CAPILLARY BLOOD GLUCOSE      POCT Blood Glucose.: 105 mg/dL (20 May 2018 08:25)  POCT Blood Glucose.: 127 mg/dL (20 May 2018 06:08)  POCT Blood Glucose.: 212 mg/dL (19 May 2018 22:18)  POCT Blood Glucose.: 121 mg/dL (19 May 2018 18:17)  POCT Blood Glucose.: 187 mg/dL (19 May 2018 12:31)    I&O's Summary      PHYSICAL EXAM:  GENERAL: NAD, well-developed  HEAD:  Atraumatic, Normocephalic  EYES: EOMI, PERRLA, conjunctiva and sclera clear  NECK: Supple, No JVD  CHEST/LUNG: Clear to auscultation bilaterally; No wheeze  HEART: Regular rate and rhythm; No murmurs, rubs, or gallops  ABDOMEN: Soft, Nontender, Nondistended; Bowel sounds present  EXTREMITIES:  2+ Peripheral Pulses. No erythema on LE or swelling. Calf equal in circumference  PSYCH: AAOx3  NEUROLOGY: non-focal, cranial nerves intact  SKIN: No rashes or lesions    LABS:                        15.4   11.00 )-----------( 255      ( 20 May 2018 05:38 )             46.3     Auto Eosinophil # 0.11  / Auto Eosinophil % 1.0   / Auto Neutrophil # 6.56  / Auto Neutrophil % 59.7  / BANDS % x                            15.1   11.24 )-----------( 238      ( 18 May 2018 10:04 )             45.0     Auto Eosinophil # 0.07  / Auto Eosinophil % 0.6   / Auto Neutrophil # 7.99  / Auto Neutrophil % 71.1  / BANDS % x        05-20    139  |  104  |  9   ----------------------------<  135<H>  3.9   |  21<L>  |  0.92  05-18    141  |  102  |  10  ----------------------------<  155<H>  3.7   |  27  |  0.83    Ca    8.8      20 May 2018 05:38  Mg     2.5     05-20  Phos  2.8     05-20  TPro  6.7  /  Alb  3.6  /  TBili  0.4  /  DBili  x   /  AST  16  /  ALT  25  /  AlkPhos  62  05-20  TPro  6.8  /  Alb  4.0  /  TBili  0.7  /  DBili  x   /  AST  21  /  ALT  33  /  AlkPhos  64  05-18    PT/INR - ( 20 May 2018 05:38 )   PT: 13.8 SEC;   INR: 1.20          PTT - ( 20 May 2018 05:38 )  PTT:36.5 SEC  CARDIAC MARKERS ( 18 May 2018 17:30 )  x     / < 0.06 ng/mL / x     / x     / x      CARDIAC MARKERS ( 18 May 2018 10:04 )  x     / < 0.06 ng/mL / x     / x     / x                RESPIRATORY  VENT:    ABG:     VBG:     RADIOLOGY & ADDITIONAL TESTS:    Imaging Personally Reviewed:    Consultant(s) Notes Reviewed:      Care Discussed with Consultants/Other Providers: Patient is a 47y old  Male who presents with a chief complaint of Left leg pain (19 May 2018 13:11)      SUBJECTIVE / OVERNIGHT EVENTS:  No overnight events. Migraine overnight. LE pain has improved but patient continues to have mild discomfort. No shortness of breath or chest pain. No signs of bleeding    MEDICATIONS  (STANDING): reviewed  dexlansoprazole DR 60 milliGRAM(s) Oral daily  insulin lispro (HumaLOG) corrective regimen sliding scale   SubCutaneous three times a day before meals  insulin lispro (HumaLOG) corrective regimen sliding scale   SubCutaneous at bedtime  nebivolol 10 milliGRAM(s) Oral daily  pregabalin 50 milliGRAM(s) Oral at bedtime  rivaroxaban 15 milliGRAM(s) Oral two times a day  tamsulosin 0.4 milliGRAM(s) Oral at bedtime  Topiramate ER Capsules 100 milliGRAM(s) 100 milliGRAM(s) Oral daily    MEDICATIONS  (PRN):  acetaminophen   Tablet. 650 milliGRAM(s) Oral every 6 hours PRN Mild Pain (1 - 3)  oxyCODONE    5 mG/acetaminophen 325 mG 1 Tablet(s) Oral every 6 hours PRN Moderate to Severe Pain      CAPILLARY BLOOD GLUCOSE      POCT Blood Glucose.: 105 mg/dL (20 May 2018 08:25)  POCT Blood Glucose.: 127 mg/dL (20 May 2018 06:08)  POCT Blood Glucose.: 212 mg/dL (19 May 2018 22:18)  POCT Blood Glucose.: 121 mg/dL (19 May 2018 18:17)  POCT Blood Glucose.: 187 mg/dL (19 May 2018 12:31)    I&O's Summary      PHYSICAL EXAM:  GENERAL: NAD, well-developed  HEAD:  Atraumatic, Normocephalic  EYES: EOMI, PERRLA, conjunctiva and sclera clear  NECK: Supple, No JVD  CHEST/LUNG: Clear to auscultation bilaterally; No wheeze  HEART: Regular rate and rhythm; No murmurs, rubs, or gallops  ABDOMEN: Soft, Nontender, Nondistended; Bowel sounds present  EXTREMITIES:  2+ Peripheral Pulses. No erythema on LE or swelling. Calf equal in circumference  PSYCH: AAOx3  NEUROLOGY: non-focal, cranial nerves intact  SKIN: No rashes or lesions    LABS: reviewed                        15.4   11.00 )-----------( 255      ( 20 May 2018 05:38 )             46.3     Auto Eosinophil # 0.11  / Auto Eosinophil % 1.0   / Auto Neutrophil # 6.56  / Auto Neutrophil % 59.7  / BANDS % x                            15.1   11.24 )-----------( 238      ( 18 May 2018 10:04 )             45.0     Auto Eosinophil # 0.07  / Auto Eosinophil % 0.6   / Auto Neutrophil # 7.99  / Auto Neutrophil % 71.1  / BANDS % x        05-20    139  |  104  |  9   ----------------------------<  135<H>  3.9   |  21<L>  |  0.92  05-18    141  |  102  |  10  ----------------------------<  155<H>  3.7   |  27  |  0.83    Ca    8.8      20 May 2018 05:38  Mg     2.5     05-20  Phos  2.8     05-20  TPro  6.7  /  Alb  3.6  /  TBili  0.4  /  DBili  x   /  AST  16  /  ALT  25  /  AlkPhos  62  05-20  TPro  6.8  /  Alb  4.0  /  TBili  0.7  /  DBili  x   /  AST  21  /  ALT  33  /  AlkPhos  64  05-18    PT/INR - ( 20 May 2018 05:38 )   PT: 13.8 SEC;   INR: 1.20          PTT - ( 20 May 2018 05:38 )  PTT:36.5 SEC  CARDIAC MARKERS ( 18 May 2018 17:30 )  x     / < 0.06 ng/mL / x     / x     / x      CARDIAC MARKERS ( 18 May 2018 10:04 )  x     / < 0.06 ng/mL / x     / x     / x              RADIOLOGY & ADDITIONAL TESTS:    Imaging Personally Reviewed:  LE Dopplers-   < from: US Duplex Venous Lower Ext Complete, Bilateral (05.18.18 @ 11:04) >  Below-the-knee deep venous thrombosis on the left.  No evidence of deep venous thrombosis on the right    CT Head-< from: CT Head No Cont (05.19.18 @ 03:19) >  No acute intracranial bleeding, mass effect, or shift.     Small right maxillary sinus fluid level may reflect acute sinusitis in     CT Angio Chest w/ IV Cont (05.18.18 @ 11:54) >  Extensive bilateral pulmonary arterial emboli as described   above.    Consultant(s) Notes Reviewed:  Neurology  Care Discussed with Consultants/Other Providers:

## 2018-05-20 NOTE — PROGRESS NOTE ADULT - PROBLEM SELECTOR PLAN 3
Patient follows with Dr. Cosme at [facility]. He has required rare phlebotomies; he reports none recently.   -May require therapeutic phlebotomy to maintain hematocrit <45

## 2018-05-20 NOTE — PROGRESS NOTE ADULT - PROBLEM SELECTOR PLAN 6
Currently, headache is resolved. HA 2/2 to known migraine disorder.    - neuro following  -Continue with tramadol 300 qhs per home dose  -Continue with Lyrica 50 mg BID

## 2018-05-20 NOTE — CONSULT NOTE ADULT - PROBLEM SELECTOR RECOMMENDATION 2
-Continue anticoagulation per medicine. He has been started on Xarelto.  -Based on results above, will access for the need of hypercoagulable w/u as outpt.    Will f/u on results.    D/W team    Please page at [phone] with questions or concerns.

## 2018-05-20 NOTE — PROGRESS NOTE ADULT - ASSESSMENT
This is a male with headaches from sever cervical radicualopathy     continue exercises for neck     pain medication

## 2018-05-20 NOTE — CONSULT NOTE ADULT - ASSESSMENT
47 year old male with migraines for many years in addition to surgeries on cervical and lumbar spine presenting with bilateral leg cramping found to have b/l DVT with current migraine. Normal neurologic exam and CT head normal.   Likely typical migraine for this patient. CTh negative for hemorrhage. Headache is resolving with medications.
Acute LLE DVT with extensive bilateral PE without evidence of RV strain on CTA chest  Unclear etiology at this time.  Patient is hemodynamically stable.  He has history of recent hospitalization without extensive immobilization, secondary PV  Exclude neoplasm (ie testicular exam, review GI history)  Echocardiogram pending  Start on Xarelto 15 BID x 21 days with transition to 20 mg daily.  Duration of therapy likely extended at this time (? unprovoked)  Can f/u in office.
Mr. Parson is a 46 y/o M, with h/o secondary Polycythemia, admitted for PE and left leg DVT on Xarelto. Heme consulted for the same.

## 2018-05-20 NOTE — CONSULT NOTE ADULT - SUBJECTIVE AND OBJECTIVE BOX
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     Patient reports that one day prior to presentation he had gradual onset of left sided calf pain, redness, and edema. He began to have difficulty bearing weight prompting him to present to ER. Work up including lower extremity ultrasound revealing below the knee left sided DVT. CTA showed bilateral pulmonary embolism without right heart strain. He was admitted to the CDU for further workup and management. Transthoracic echocardiogram showed normal left ventricular systolic function and grossly normal right ventricular systolic function. He was evaluated by cardiology and started on rivaroxaban for treatment of acute thrombus.     Overnight, the patient developed severe headache described as one of the worst headaches of his life. The patient reported that it was reminiscent of previous migraines but worse in intensity. Urgent CTH at that time showed small right sided maxillary sinus fluid level. He was treated with Fioricet magnesium, Toradol valproate acid and percocet with improvement in his pain. Presently, the headache is resolved. He denies any photophobia or phonophobia presently. Denies weakness in upper or lower extremities. Denies difficulty with speech or swallow. The patient was evaluated by neurology who did not recommend additional imaging.     In reference to polycythemia vera, the patient follows at [facility] with Dr. Cosme. He reports that he is treated with intermittent phlebotomy, usually about twice yearly, though he is unsure of last time he was phlebotomized.     Of note, the patient was admitted to CDU on 5/10 with headache and dizziness. At that time, he was also evaluated by neurology. On examination, he had had horizontal nystagmus and given questionable history of TBI and cranioplasty, he was recommended to rule out secondary cause with MRI. The patient was unable to tolerate MRI secondary to claustrophobia and he instead completed a CTA head and neck, which was unrevealing. (19 May 2018 13:11)    PAST MEDICAL & SURGICAL HISTORY:  Migraine  DM (diabetes mellitus)  Vertigo  Hypertension  Polycythemia vera  History of lumbar spinal fusion  H/O bilateral inguinal hernia repair    FAMILY HISTORY:    Social History  MEDICATIONS  (STANDING):  dexlansoprazole DR 60 milliGRAM(s) Oral daily  insulin lispro (HumaLOG) corrective regimen sliding scale   SubCutaneous three times a day before meals  insulin lispro (HumaLOG) corrective regimen sliding scale   SubCutaneous at bedtime  nebivolol 10 milliGRAM(s) Oral daily  pregabalin 50 milliGRAM(s) Oral at bedtime  rivaroxaban 15 milliGRAM(s) Oral two times a day  tamsulosin 0.4 milliGRAM(s) Oral at bedtime  Topiramate ER Capsules 100 milliGRAM(s) 100 milliGRAM(s) Oral daily    MEDICATIONS  (PRN):  acetaminophen   Tablet. 650 milliGRAM(s) Oral every 6 hours PRN Mild Pain (1 - 3)  oxyCODONE    5 mG/acetaminophen 325 mG 1 Tablet(s) Oral every 6 hours PRN Moderate to Severe Pain    No Known Allergies    REVIEW OF SYSTEMS      General:	    Skin/Breast:  	  Ophthalmologic:  	  ENMT:	    Respiratory and Thorax:  	  Cardiovascular:	    Gastrointestinal:	    Genitourinary:	    Musculoskeletal:	    Neurological:	    Psychiatric:	    Hematology/Lymphatics:	    Endocrine:	    Allergic/Immunologic:	  Vital Signs Last 24 Hrs  T(C): 36.2 (20 May 2018 05:55), Max: 37 (19 May 2018 21:06)  T(F): 97.2 (20 May 2018 05:55), Max: 98.6 (19 May 2018 21:06)  HR: 84 (20 May 2018 05:55) (79 - 90)  BP: 118/82 (20 May 2018 05:55) (112/83 - 128/86)  BP(mean): --  RR: 18 (20 May 2018 05:55) (17 - 18)  SpO2: 97% (20 May 2018 05:55) (96% - 97%)  Physical Examination  Constitutional: Alert, oriented X3  Eyes: Normal  ENMT: normal  Neck: No lymphadneopathy  Respiratory: b/l clear to auscultation  Cardiovascular: S1,S2,M0  Abdomen: Soft, non tender, BS present  Gastrointestinal: soft, non tender, BS present  Extremities: soft, no edema  Neurological: intact  Skin: no petechiae  Musculoskeletal: normal  Psychiatric: normal                            15.4   11.00 )-----------( 255      ( 20 May 2018 05:38 )             46.3     05-20    139  |  104  |  9   ----------------------------<  135<H>  3.9   |  21<L>  |  0.92    Ca    8.8      20 May 2018 05:38  Phos  2.8     05-20  Mg     2.5     05-20    TPro  6.7  /  Alb  3.6  /  TBili  0.4  /  DBili  x   /  AST  16  /  ALT  25  /  AlkPhos  62  05-20      Radiology  Assessment and Plan HPI:  48 y/o M hx lumbar radiculopathy, T2DM, [place] 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 Cosme, h/x, migraine, hypertension and recent diagnosis of vertigo presents with left lower extremity cramping and pain.     Patient reports that one day prior to presentation he had gradual onset of left sided calf pain, redness, and edema. He began to have difficulty bearing weight prompting him to present to ER. Work up including lower extremity ultrasound revealing below the knee left sided DVT. CTA showed bilateral pulmonary embolism without right heart strain. He was admitted to the CDU for further workup and management. Transthoracic echocardiogram showed normal left ventricular systolic function and grossly normal right ventricular systolic function. He was evaluated by cardiology and started on rivaroxaban for treatment of acute thrombus.     Overnight, the patient developed severe headache described as one of the worst headaches of his life. The patient reported that it was reminiscent of previous migraines but worse in intensity. Urgent CTH at that time showed small right sided maxillary sinus fluid level. He was treated with Fioricet magnesium, Toradol valproate acid and percocet with improvement in his pain. Presently, the headache is resolved. He denies any photophobia or phonophobia presently. Denies weakness in upper or lower extremities. Denies difficulty with speech or swallow. The patient was evaluated by neurology who did not recommend additional imaging.     In reference to polycythemia vera, the patient follows at [facility] with Dr. Cosme. He reports that he is treated with intermittent phlebotomy, usually about twice yearly, though he is unsure of last time he was phlebotomized.     PAST MEDICAL & SURGICAL HISTORY:  Migraine  DM (diabetes mellitus)  Vertigo  Hypertension  Polycythemia vera  History of lumbar spinal fusion  H/O bilateral inguinal hernia repair    FAMILY HISTORY:    Social History  MEDICATIONS  (STANDING):  dexlansoprazole DR 60 milliGRAM(s) Oral daily  insulin lispro (HumaLOG) corrective regimen sliding scale   SubCutaneous three times a day before meals  insulin lispro (HumaLOG) corrective regimen sliding scale   SubCutaneous at bedtime  nebivolol 10 milliGRAM(s) Oral daily  pregabalin 50 milliGRAM(s) Oral at bedtime  rivaroxaban 15 milliGRAM(s) Oral two times a day  tamsulosin 0.4 milliGRAM(s) Oral at bedtime  Topiramate ER Capsules 100 milliGRAM(s) 100 milliGRAM(s) Oral daily    MEDICATIONS  (PRN):  acetaminophen   Tablet. 650 milliGRAM(s) Oral every 6 hours PRN Mild Pain (1 - 3)  oxyCODONE    5 mG/acetaminophen 325 mG 1 Tablet(s) Oral every 6 hours PRN Moderate to Severe Pain    No Known Allergies    REVIEW OF SYSTEMS      General:	    Skin/Breast:  	  Ophthalmologic:  	  ENMT:	    Respiratory and Thorax:  	  Cardiovascular:	    Gastrointestinal:	    Genitourinary:	    Musculoskeletal:	    Neurological:	    Psychiatric:	    Hematology/Lymphatics:	    Endocrine:	    Allergic/Immunologic:	  Vital Signs Last 24 Hrs  T(C): 36.2 (20 May 2018 05:55), Max: 37 (19 May 2018 21:06)  T(F): 97.2 (20 May 2018 05:55), Max: 98.6 (19 May 2018 21:06)  HR: 84 (20 May 2018 05:55) (79 - 90)  BP: 118/82 (20 May 2018 05:55) (112/83 - 128/86)  BP(mean): --  RR: 18 (20 May 2018 05:55) (17 - 18)  SpO2: 97% (20 May 2018 05:55) (96% - 97%)  Physical Examination  Constitutional: Alert, oriented X3  Eyes: Normal  ENMT: normal  Neck: No lymphadneopathy  Respiratory: b/l clear to auscultation  Cardiovascular: S1,S2,M0  Abdomen: Soft, non tender, BS present  Gastrointestinal: soft, non tender, BS present  Extremities: soft, no edema  Neurological: intact  Skin: no petechiae  Musculoskeletal: normal  Psychiatric: normal                            15.4   11.00 )-----------( 255      ( 20 May 2018 05:38 )             46.3     05-20    139  |  104  |  9   ----------------------------<  135<H>  3.9   |  21<L>  |  0.92    Ca    8.8      20 May 2018 05:38  Phos  2.8     05-20  Mg     2.5     05-20    TPro  6.7  /  Alb  3.6  /  TBili  0.4  /  DBili  x   /  AST  16  /  ALT  25  /  AlkPhos  62  05-20      Radiology  Assessment and Plan HPI:  48 y/o M hx lumbar radiculopathy, T2DM, [place] 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 Cosme, h/x, migraine, hypertension and recent diagnosis of vertigo presents with left lower extremity cramping, pain and shortness of breath.   Patient was found to have left leg DVT and PE, for which he was started on Xarelto.     Patient currently feels ok. No leg pain, chest pain or sob. He denies recent travel, surgery, weight loss, fatigue. No bleeding or bruising. He was admitted to the hospital last week for vertigo, and states has not been as active for last 1 week.       PAST MEDICAL & SURGICAL HISTORY:  Migraine  DM (diabetes mellitus)  Vertigo  Hypertension  Polycythemia vera  History of lumbar spinal fusion  H/O bilateral inguinal hernia repair    FAMILY HISTORY:    Social History  MEDICATIONS  (STANDING):  dexlansoprazole DR 60 milliGRAM(s) Oral daily  insulin lispro (HumaLOG) corrective regimen sliding scale   SubCutaneous three times a day before meals  insulin lispro (HumaLOG) corrective regimen sliding scale   SubCutaneous at bedtime  nebivolol 10 milliGRAM(s) Oral daily  pregabalin 50 milliGRAM(s) Oral at bedtime  rivaroxaban 15 milliGRAM(s) Oral two times a day  tamsulosin 0.4 milliGRAM(s) Oral at bedtime  Topiramate ER Capsules 100 milliGRAM(s) 100 milliGRAM(s) Oral daily    MEDICATIONS  (PRN):  acetaminophen   Tablet. 650 milliGRAM(s) Oral every 6 hours PRN Mild Pain (1 - 3)  oxyCODONE    5 mG/acetaminophen 325 mG 1 Tablet(s) Oral every 6 hours PRN Moderate to Severe Pain    No Known Allergies    REVIEW OF SYSTEMS    10 points ROS is negative except for the ones in HPI    Vital Signs Last 24 Hrs  T(C): 36.2 (20 May 2018 05:55), Max: 37 (19 May 2018 21:06)  T(F): 97.2 (20 May 2018 05:55), Max: 98.6 (19 May 2018 21:06)  HR: 84 (20 May 2018 05:55) (79 - 90)  BP: 118/82 (20 May 2018 05:55) (112/83 - 128/86)  BP(mean): --  RR: 18 (20 May 2018 05:55) (17 - 18)  SpO2: 97% (20 May 2018 05:55) (96% - 97%)    Physical Examination  Constitutional: Alert, oriented X3  Eyes: Normal  ENMT: normal  Neck: No lymphadenopathy  Respiratory: b/l clear to auscultation  Cardiovascular: S1,S2,M0  Abdomen: Soft, non tender, BS present  Gastrointestinal: soft, non tender, BS present  Extremities: soft, no edema  Neurological: intact  Skin: no petechiae  Musculoskeletal: normal  Psychiatric: normal                            15.4   11.00 )-----------( 255      ( 20 May 2018 05:38 )             46.3     05-20    139  |  104  |  9   ----------------------------<  135<H>  3.9   |  21<L>  |  0.92    Ca    8.8      20 May 2018 05:38  Phos  2.8     05-20  Mg     2.5     05-20    TPro  6.7  /  Alb  3.6  /  TBili  0.4  /  DBili  x   /  AST  16  /  ALT  25  /  AlkPhos  62  05-20      Radiology  Assessment and Plan

## 2018-05-20 NOTE — CONSULT NOTE ADULT - PROBLEM SELECTOR RECOMMENDATION 9
Is s/p 2gm IV magnesium, please give 500mg IV depacon over 20 minutes after the mag.   Continue with PRN's while patient in CDU. Can also give IV tylenol.   Avoid NSAIDs.   No further imaging unless headache acutely worsening or patient deteriorates neurologically.  He can follow up with outpatient neurologist and resume his home medications for migraine.
-Please repeat HOUSTON 2. Recc getting c-mpl.  -Start hydrea 1 gm twice per day. Start Aspirin 81 mg daily.  -Start IV hydration. Continue supportive care.

## 2018-05-20 NOTE — PROGRESS NOTE ADULT - ATTENDING COMMENTS
Pt seen and examined. Continues to have headaches will reach out to neurology and clarify if MRV needed as these headaches are more persistent than usual. Reports he was to have an MRI recently but couldn't tolerate due to claustrophobia. Also concern whether PV is contributing as hematocrit is 46 today. Will reach out to outpatient hematologist to discuss if there is need for phlebotomy. Continue xarelto. PT eval pending. Pt seen and examined. Continues to have headaches will reach out to neurology and clarify if MRV needed as these headaches are more persistent than usual. Reports he was to have an MRI recently but couldn't tolerate due to claustrophobia.   Also concern whether PV is contributing as hematocrit is 46 today. Team d/w outpatient hematologist no phlebotomy indicated at this time joe in setting of PE. Heme consulted and recommended hydrea. F/u formal consult. Continue xarelto.   PT eval pending. Pt seen and examined. Continues to have headaches will reach out to neurology and clarify if MRV needed as these headaches are more persistent than usual. Reports he was to have an MRI recently but couldn't tolerate due to claustrophobia.   Also concern whether PV is contributing as hematocrit is 46 today. Team d/w outpatient hematologist no phlebotomy indicated at this time joe in setting of PE. Heme consulted as per Dr. Cosme . plan to f/u formal consult. Continue xarelto.   PT eval pending.

## 2018-05-21 ENCOUNTER — TRANSCRIPTION ENCOUNTER (OUTPATIENT)
Age: 48
End: 2018-05-21

## 2018-05-21 VITALS
OXYGEN SATURATION: 97 % | RESPIRATION RATE: 18 BRPM | SYSTOLIC BLOOD PRESSURE: 129 MMHG | TEMPERATURE: 98 F | HEART RATE: 72 BPM | DIASTOLIC BLOOD PRESSURE: 89 MMHG

## 2018-05-21 DIAGNOSIS — N40.0 BENIGN PROSTATIC HYPERPLASIA WITHOUT LOWER URINARY TRACT SYMPTOMS: ICD-10-CM

## 2018-05-21 LAB
HCT VFR BLD CALC: 42.3 % — SIGNIFICANT CHANGE UP (ref 39–50)
HGB BLD-MCNC: 13.9 G/DL — SIGNIFICANT CHANGE UP (ref 13–17)
MCHC RBC-ENTMCNC: 27 PG — SIGNIFICANT CHANGE UP (ref 27–34)
MCHC RBC-ENTMCNC: 32.9 % — SIGNIFICANT CHANGE UP (ref 32–36)
MCV RBC AUTO: 82.3 FL — SIGNIFICANT CHANGE UP (ref 80–100)
NRBC # FLD: 0 — SIGNIFICANT CHANGE UP
PLATELET # BLD AUTO: 239 K/UL — SIGNIFICANT CHANGE UP (ref 150–400)
PMV BLD: 9.4 FL — SIGNIFICANT CHANGE UP (ref 7–13)
RBC # BLD: 5.14 M/UL — SIGNIFICANT CHANGE UP (ref 4.2–5.8)
RBC # FLD: 16 % — HIGH (ref 10.3–14.5)
WBC # BLD: 8.31 K/UL — SIGNIFICANT CHANGE UP (ref 3.8–10.5)
WBC # FLD AUTO: 8.31 K/UL — SIGNIFICANT CHANGE UP (ref 3.8–10.5)

## 2018-05-21 PROCEDURE — 99239 HOSP IP/OBS DSCHRG MGMT >30: CPT

## 2018-05-21 PROCEDURE — 99232 SBSQ HOSP IP/OBS MODERATE 35: CPT

## 2018-05-21 PROCEDURE — 70496 CT ANGIOGRAPHY HEAD: CPT | Mod: 26

## 2018-05-21 RX ORDER — SUMATRIPTAN SUCCINATE 4 MG/.5ML
25 INJECTION, SOLUTION SUBCUTANEOUS ONCE
Qty: 0 | Refills: 0 | Status: COMPLETED | OUTPATIENT
Start: 2018-05-21 | End: 2018-05-21

## 2018-05-21 RX ORDER — RIVAROXABAN 15 MG-20MG
1 KIT ORAL
Qty: 60 | Refills: 0 | OUTPATIENT
Start: 2018-05-21 | End: 2018-06-19

## 2018-05-21 RX ORDER — ASPIRIN/CALCIUM CARB/MAGNESIUM 324 MG
1 TABLET ORAL
Qty: 0 | Refills: 0 | COMMUNITY
Start: 2018-05-21

## 2018-05-21 RX ORDER — TRAMADOL HYDROCHLORIDE 50 MG/1
1 TABLET ORAL
Qty: 0 | Refills: 0 | COMMUNITY

## 2018-05-21 RX ADMIN — OXYCODONE AND ACETAMINOPHEN 1 TABLET(S): 5; 325 TABLET ORAL at 07:15

## 2018-05-21 RX ADMIN — OXYCODONE AND ACETAMINOPHEN 1 TABLET(S): 5; 325 TABLET ORAL at 13:50

## 2018-05-21 RX ADMIN — RIVAROXABAN 15 MILLIGRAM(S): KIT at 06:00

## 2018-05-21 RX ADMIN — SODIUM CHLORIDE 125 MILLILITER(S): 9 INJECTION INTRAMUSCULAR; INTRAVENOUS; SUBCUTANEOUS at 06:00

## 2018-05-21 RX ADMIN — OXYCODONE AND ACETAMINOPHEN 1 TABLET(S): 5; 325 TABLET ORAL at 07:49

## 2018-05-21 RX ADMIN — NEBIVOLOL HYDROCHLORIDE 10 MILLIGRAM(S): 5 TABLET ORAL at 06:00

## 2018-05-21 RX ADMIN — HYDROXYUREA 1000 MILLIGRAM(S): 500 CAPSULE ORAL at 06:00

## 2018-05-21 RX ADMIN — DEXLANSOPRAZOLE 60 MILLIGRAM(S): 30 CAPSULE, DELAYED RELEASE ORAL at 11:38

## 2018-05-21 RX ADMIN — OXYCODONE AND ACETAMINOPHEN 1 TABLET(S): 5; 325 TABLET ORAL at 13:16

## 2018-05-21 RX ADMIN — Medication 81 MILLIGRAM(S): at 11:30

## 2018-05-21 NOTE — PROGRESS NOTE ADULT - ASSESSMENT
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 Also c/b elevated hct 2/2 to polycythemia vera. 48 y/o M hx lumbar radiculopathy, T2DM, HOUSTON 2 negative polycythemia vera, migraine, HTN presents with left lower extremity cramping and pain secondary to LLE DVT c/b extensive b/l PE without evidence of right heart strain. Also c/b elevated hct 2/2 to polycythemia vera.

## 2018-05-21 NOTE — PROGRESS NOTE ADULT - PROBLEM SELECTOR PLAN 2
LLE DVT seen on Duplex; pain improving, no appreciable edema or erythema.  - c/w xarelto 15 mg BID
LLE DVT seen on Duplex; pain improving, no appreciable edema or erythema.  - c/w xarelto 15 mg BID

## 2018-05-21 NOTE — PROGRESS NOTE ADULT - ASSESSMENT
Acute LLE DVT with extensive bilateral PE without evidence of RV strain on CTA chest  Unclear etiology at this time.  Patient is hemodynamically stable.  He has history of recent hospitalization without extensive immobilization, secondary PV  Exclude neoplasm (ie testicular exam, review GI history)  Echocardiogram pending  Start on Xarelto 15 BID x 21 days with transition to 20 mg daily.  Duration of therapy likely extended at this time (? unprovoked)  Can f/u in office. Acute LLE DVT with extensive bilateral PE without evidence of RV strain on CTA chest  Unclear etiology at this time.  Patient is hemodynamically stable.  He has history of recent hospitalization without extensive immobilization, secondary PV  Tolerating Xarelto 15 BID x 21 days with transition to 20 mg daily.  Duration of therapy likely extended at this time (? unprovoked)  Hematology recommendations re: PV workup.  Can f/u in office.

## 2018-05-21 NOTE — PROGRESS NOTE ADULT - PROBLEM SELECTOR PLAN 5
HbA1c 8.6; Hold home Glimepiride  - c/w ISS and FS qac and qhs Currently well controlled  Continue with Bystolic 10 mg once daily

## 2018-05-21 NOTE — DISCHARGE NOTE ADULT - MEDICATION SUMMARY - MEDICATIONS TO TAKE
I will START or STAY ON the medications listed below when I get home from the hospital:    Cialis 5 mg oral tablet  -- 1 tab(s) by mouth once a day  -- Indication: For Erectile dysfunction    aspirin 81 mg oral delayed release tablet  -- 1 tab(s) by mouth once a day  -- Indication: For Migraine    tamsulosin 0.4 mg oral capsule  -- 1 cap(s) by mouth once a day  -- Indication: For Bph    rivaroxaban 15 mg oral tablet  -- 1 tab(s) by mouth 2 times a day  -- Indication: For DVT (deep venous thrombosis)    Lyrica 50 mg oral capsule  -- 1 cap(s) by mouth 2 times a day  -- Indication: For Migraines    glimepiride 2 mg oral tablet  -- 1 tab(s) by mouth once a day  -- Indication: For Diabetes    Trulicity Pen 0.75 mg/0.5 mL subcutaneous solution  -- 1 unit(s) subcutaneous once a day  -- Indication: For Diabetes    promethazine 6.25 mg/5 mL oral syrup  -- 10 milliliter(s) by mouth 2 times a day  -- Indication: For Dizziness    Bystolic 10 mg oral tablet  -- 1 tab(s) by mouth once a day  -- Indication: For Hypertension    baclofen 20 mg oral tablet  -- 1 tab(s) by mouth once a day  -- Indication: For Migraine    Dexilant 60 mg oral delayed release capsule  -- 1 cap(s) by mouth once a day  -- Indication: For GERD

## 2018-05-21 NOTE — PROGRESS NOTE ADULT - ATTENDING COMMENTS
Patient seen and examined by me. Case discussed with resident and agree with the resident's findings and plan as documented in the resident's note.  -f/u CT head venogram to evaluate migraine headache further  -f/u neuro recs  -c/w hydrea; f/u heme-onc recs  -c/w xarelto Patient seen and examined by me. Case discussed with resident and agree with the resident's findings and plan as documented in the resident's note.  -f/u CT head venogram to evaluate migraine headache further  -f/u neuro recs  -c/w hydrea; f/u heme-onc recs  -c/w xarelto  -possible d/c today  time spent discharging patient 42min.

## 2018-05-21 NOTE — PROGRESS NOTE ADULT - PROBLEM SELECTOR PLAN 1
VS stable overnight, no respiratory distress and is saturating well on room air.   - TTE: no evidence of heart strain on echo  -No significant provoking factor at this time.   -Continue with Xarelto 15 BID VS stable overnight, no respiratory distress and is saturating well on room air.   - TTE: no evidence of heart strain on echo  -No significant provoking factor at this time.   -Continue with Xarelto 15 BID  - c/w percocet q6 hr for LE pain

## 2018-05-21 NOTE — DISCHARGE NOTE ADULT - CONDITIONS AT DISCHARGE
Patient is much improved , alert , oriented x 4, self sufficient otherwise .  Left Leg is not swollen but has pain 3/10 after Percocet was given earlier.  Vital Signs are stable. no c/o chest Pains or SOB at this time.  Discharge Instructions are given.

## 2018-05-21 NOTE — DISCHARGE NOTE ADULT - PLAN OF CARE
Treatment You have blood clot in your lungs. Please continue xarelto as prescribed. Follow up with Dr. Cosme Follow up with Dr. Cosme. You have blood clot in your legs. Please continue xarelto as prescribed. Follow up with Dr. Cosme

## 2018-05-21 NOTE — DISCHARGE NOTE ADULT - CARE PLAN
Principal Discharge DX:	Pulmonary embolism  Goal:	Treatment  Assessment and plan of treatment:	You have blood clot in your lungs. Please continue xarelto as prescribed. Follow up with Dr. Cosme  Secondary Diagnosis:	Polycythemia vera  Assessment and plan of treatment:	Follow up with Dr. Cosme.  Secondary Diagnosis:	DVT (deep venous thrombosis)  Assessment and plan of treatment:	You have blood clot in your legs. Please continue xarelto as prescribed. Follow up with Dr. Cosme

## 2018-05-21 NOTE — DISCHARGE NOTE ADULT - CARE PROVIDER_API CALL
Marilee Cosme (DO), Hematology; Medical Oncology  [address] [place] [address]  Phone: [phone]  Fax: [phone]

## 2018-05-21 NOTE — DISCHARGE NOTE ADULT - HOSPITAL COURSE
48 y/o M hx lumbar radiculopathy, T2DM, HOUSTON 2 negative polycythemia vera, migraine, hypertension and recent diagnosis of vertigo presents with left lower extremity cramping and pain secondary to LLE DVT c/b extensive b/l PE.  Started on xarelto for antioagulation. Hematology consulted for polycythemia vera. Started on Hydrea. Hematology said d/c hydrea and follow up with Dr. Cosme as an outpatient. CT venogram negative for a thrombosis.     Patient to f/u with hematology.

## 2018-05-21 NOTE — PROGRESS NOTE ADULT - SUBJECTIVE AND OBJECTIVE BOX
Cardiology/Vascular Medicine Inpatient Progress Note    Started on hydroxyurea and IV hydration as per Heme recommendations for PV      Vital Signs Last 24 Hrs  T(C): 36.4 (20 May 2018 22:37), Max: 36.5 (20 May 2018 13:51)  T(F): 97.6 (20 May 2018 22:37), Max: 97.7 (20 May 2018 13:51)  HR: 64 (21 May 2018 04:00) (64 - 78)  BP: 132/85 (21 May 2018 04:00) (119/68 - 132/85)  BP(mean): --  RR: 18 (21 May 2018 04:00) (18 - 18)  SpO2: 98% (21 May 2018 04:00) (97% - 99%)    Appearance: Normal	  HEENT:   Normal oral mucosa, PERRL, EOMI	  Lymphatic: No lymphadenopathy  Cardiovascular: Normal S1 S2, No JVD, No murmurs, No edema  Respiratory: Lungs clear to auscultation	  Psychiatry: A & O x 3, Mood & affect appropriate  Gastrointestinal:  Soft, Non-tender, + BS	  Skin: No rashes, No ecchymoses, No cyanosis	  Neurologic: Non-focal  Extremities: Normal range of motion, No clubbing, cyanosis or edema  Vascular: Peripheral pulses palpable 2+ bilaterally    MEDICATIONS  (STANDING):  aspirin enteric coated 81 milliGRAM(s) Oral daily  dexlansoprazole DR 60 milliGRAM(s) Oral daily  hydroxyurea 1000 milliGRAM(s) Oral two times a day  insulin lispro (HumaLOG) corrective regimen sliding scale   SubCutaneous three times a day before meals  insulin lispro (HumaLOG) corrective regimen sliding scale   SubCutaneous at bedtime  nebivolol 10 milliGRAM(s) Oral daily  pregabalin 50 milliGRAM(s) Oral at bedtime  rivaroxaban 15 milliGRAM(s) Oral two times a day  sodium chloride 0.9%. 1000 milliLiter(s) (125 mL/Hr) IV Continuous <Continuous>  tamsulosin 0.4 milliGRAM(s) Oral at bedtime  Topiramate ER Capsules 100 milliGRAM(s) 100 milliGRAM(s) Oral daily    MEDICATIONS  (PRN):  acetaminophen   Tablet. 650 milliGRAM(s) Oral every 6 hours PRN Mild Pain (1 - 3)  oxyCODONE    5 mG/acetaminophen 325 mG 1 Tablet(s) Oral every 6 hours PRN Moderate to Severe Pain      LABS:	 	                          15.4   11.00 )-----------( 255      ( 20 May 2018 05:38 )             46.3   05-20    139  |  104  |  9   ----------------------------<  135<H>  3.9   |  21<L>  |  0.92    Ca    8.8      20 May 2018 05:38  Phos  2.8     05-20  Mg     2.5     05-20    TPro  6.7  /  Alb  3.6  /  TBili  0.4  /  DBili  x   /  AST  16  /  ALT  25  /  AlkPhos  62  05-20    PT/INR - ( 20 May 2018 05:38 )   PT: 13.8 SEC;   INR: 1.20     PTT - ( 20 May 2018 05:38 )  PTT:36.5 SEC                    < from: US Duplex Venous Lower Ext Complete, Bilateral (05.18.18 @ 11:04) >  EXAM:  US DPLX LWR EXT VEINS COMPL BI        PROCEDURE DATE:  May 18 2018         INTERPRETATION:  CLINICAL INFORMATION: Left lower extremity pain and   swelling.    COMPARISON: None available.    TECHNIQUE: Duplex sonography of the BILATERAL LOWERextremities with   color and spectral Doppler, with and without compression.      FINDINGS:    There is normal compressibility of the bilateral common femoral, femoral,   gastrocnemius and popliteal veins. There is thrombosis of the left   posterior tibial and the soleal veins. The left peroneal veins are   normal. Normal calf veins on the right. Doppler examination shows normal   spontaneous and phasic flow.  A small popliteal cyst on the left measuring 3.4 x 1.5 x 1.1 cm.    IMPRESSION:   Below-the-knee deep venous thrombosis on the left.  No evidence of deep venous thrombosis on the right.    Findings discussed with DR. JEB DEVLIN on 5/18/2018 11:15 AM with read   back.      SPRING RAMESH M.D. ATTENDING RADIOLOGIST  This document has been electronically signed. May 18 2018 11:15AM            < from: CT Angio Chest w/ IV Cont (05.18.18 @ 11:54) >  EXAM:  CT ANGIO CHEST (W)AW IC        PROCEDURE DATE:  May 18 2018         INTERPRETATION:  Clinical indications: DVT of the left lower extremity,   evaluate for pulmonary embolism.    CT pulmonary angiogram was performed following uncomplicated intravenous   administration of 90 cc of Omnipaque-350. 10 cc of contrast was   discarded. MIP images are submitted.    There are bilateral pulmonary arterial emboli. There is thrombus within   the distal aspect of the right pulmonary artery extending into the right   upper lobar, segmental and subsegmental pulmonary arterial branches.   There is a thrombus within the right interlobar pulmonary artery   extending into the right lower and right middle lobar, segmental and   subsegmental pulmonary arterial branches. There are segmental and   subsegmental PEs within the left upper lobe. Thrombus is also noted   within the left lower lobe pulmonary artery extending into the segmental   and subsegmental pulmonary arterial branches.    There are no pathologically enlarged bilateral axillary, mediastinal or   hilar lymph nodes. There are no pathologically enlarged bilateral   axillary, mediastinal or hilar lymph nodes. The heart size is normal.   There is no pericardial effusion. There is no evidence of right heart   strain. There are no pleural effusions.    Evaluation of the upper abdominal organs demonstrate fatty infiltration   of the liver.    Evaluation of the lungs demonstrate no evidence of pneumonia. There is a   2 mm nodule associatedwith the minor fissure unchanged since the prior   CT of November 10, 2007. There are no new lung nodules. There is no   interstitial lung disease. There are no central endobronchial lesions.    Evaluation of the bones and a straight no significant abnormality.    IMPRESSION: Extensive bilateral pulmonary arterial emboli as described   above.    Findings discussed with Dr. Devlin on May 18, 2018 at 12:13 PM with read   back.        MARIANO WASHINGTON M.D. ATTENDING RADIOLOGIST  This document has been electronically signed. May 18 2018 12:14PM            < from: CT Head No Cont (05.19.18 @ 03:19) >  EXAM:  CT BRAIN        PROCEDURE DATE:  May 19 2018         INTERPRETATION:  HISTORY: Headache    COMPARISON: CT head 5/10/2018    TECHNIQUE: Axial noncontrast CT images from the skull base to the vertex   were obtained and submitted for interpretation. Coronal and sagittal   reformatted images were performed. Bone and soft tissue windows were   evaluated.    FINDINGS:      Changes from cranioplasty along the vertex were again noted.    There is no acute intracranial mass-effect, hemorrhage, midline shift, or   abnormal extra-axial fluid collection.     Ventricles, sulci, and cisterns are normal in size for the patient's age   without hydrocephalus. Basal cisterns are patent.     Small right maxillary sinus fluid level is suggested. Remaining sinuses   are clear without air-fluid level.    IMPRESSION:     No acute intracranial bleeding, mass effect, or shift.     Small right maxillary sinus fluid level may reflect acute sinusitis in   the appropriate clinical setting.      REGINA GAFFNEY M.D., RADIOLOGY RESIDENT  This document has been electronically signed.  ART VALDEZ M.D., ATTENDING RADIOLOGIST  This document has been electronically signed. May 19 2018  5:09AM            < from: Transthoracic Echocardiogram (05.18.18 @ 16:08) >  Patient name: GEENA ROSALES  YOB: 1970   Age: 47 (M)   MR#: [id number]  Study Date: 5/18/2018  Location: O/PSonographer: Gunjan Santana [facility]  Study quality: Technically Fair  Referring Physician: Not Available Doctor, MD  Blood Pressure:141/95 mmHg  Height: 173 cm  Weight: 100 kg  BSA: 2.1 m2  ------------------------------------------------------------------------  PROCEDURE: Transthoracic echocardiogram with 2-D, M-Mode  and complete spectral and color flow Doppler.  INDICATION: Abnormal electrocardiogram (ECG) (EKG) (R94.31)  ------------------------------------------------------------------------  DIMENSIONS:  Dimensions:     Normal Values:  LA:     3.6 cm    2.0 - 4.0 cm  Ao:     3.0 cm    2.0 - 3.8 cm  SEPTUM: 0.8 cm    0.6 - 1.2 cm  PWT:    0.8 cm    0.6 - 1.1 cm  LVIDd:  4.8 cm    3.0 - 5.6 cm  LVIDs:  3.0 cm    1.8 - 4.0 cm  Derived Variables:  LVMI: 59 g/m2  RWT: 0.33  Fractional short: 38 %  Ejection Fraction (Teicholtz): 67 %  ------------------------------------------------------------------------  OBSERVATIONS:  Mitral Valve: Normal mitral valve. Minimal mitral  regurgitation.  Aortic Root: Normal aortic root.  Aortic Valve: Aortic valve leaflet morphology not well  visualized.  Left Atrium: Normal left atrium.  Left Ventricle: Normal left ventricular systolic function.  No segmental wall motion abnormalities. Normal left  ventricular internal dimensions and wall thicknesses.  Right Heart: Normal right atrium. The right ventricle is  not well visualized; grossly normal right ventricular  systolic function. Normal tricuspid valve. Minimal  tricuspid regurgitation. Normal pulmonic valve.  Pericardium/PleuraNormal pericardium with no pericardial  effusion.  ------------------------------------------------------------------------  CONCLUSIONS:  1. Normal mitral valve. Minimal mitral regurgitation.  2. Normal left ventricular internal dimensions and wall  thicknesses.  3. Normal left ventricular systolic function. No segmental  wall motion abnormalities.  4. The right ventricle is not well visualized; grossly  normal right ventricular systolic function.  *** Compared with echocardiogram of 11/24/2008, no  significant changes noted.  ------------------------------------------------------------------------  Confirmed on  5/18/2018 - 17:20:07 by Juventino Cerrato M.D.  ------------------------------------------------------------------------    < end of copied text > Cardiology/Vascular Medicine Inpatient Progress Note    Started on hydroxyurea and IV hydration as per Heme recommendations for PV  No active cardiac complaints  Remains hemodynamically stable.    Vital Signs Last 24 Hrs  T(C): 36.4 (20 May 2018 22:37), Max: 36.5 (20 May 2018 13:51)  T(F): 97.6 (20 May 2018 22:37), Max: 97.7 (20 May 2018 13:51)  HR: 64 (21 May 2018 04:00) (64 - 78)  BP: 132/85 (21 May 2018 04:00) (119/68 - 132/85)  BP(mean): --  RR: 18 (21 May 2018 04:00) (18 - 18)  SpO2: 98% (21 May 2018 04:00) (97% - 99%)    Appearance: Normal	  HEENT:   Normal oral mucosa, PERRL, EOMI	  Lymphatic: No lymphadenopathy  Cardiovascular: Normal S1 S2, No JVD, No murmurs, No edema  Respiratory: Lungs clear to auscultation	  Psychiatry: A & O x 3, Mood & affect appropriate  Gastrointestinal:  Soft, Non-tender, + BS	  Skin: No rashes, No ecchymoses, No cyanosis	  Neurologic: Non-focal  Extremities: Normal range of motion, No clubbing, cyanosis or edema  Vascular: Peripheral pulses palpable 2+ bilaterally    MEDICATIONS  (STANDING):  aspirin enteric coated 81 milliGRAM(s) Oral daily  dexlansoprazole DR 60 milliGRAM(s) Oral daily  hydroxyurea 1000 milliGRAM(s) Oral two times a day  insulin lispro (HumaLOG) corrective regimen sliding scale   SubCutaneous three times a day before meals  insulin lispro (HumaLOG) corrective regimen sliding scale   SubCutaneous at bedtime  nebivolol 10 milliGRAM(s) Oral daily  pregabalin 50 milliGRAM(s) Oral at bedtime  rivaroxaban 15 milliGRAM(s) Oral two times a day  sodium chloride 0.9%. 1000 milliLiter(s) (125 mL/Hr) IV Continuous <Continuous>  tamsulosin 0.4 milliGRAM(s) Oral at bedtime  Topiramate ER Capsules 100 milliGRAM(s) 100 milliGRAM(s) Oral daily    MEDICATIONS  (PRN):  acetaminophen   Tablet. 650 milliGRAM(s) Oral every 6 hours PRN Mild Pain (1 - 3)  oxyCODONE    5 mG/acetaminophen 325 mG 1 Tablet(s) Oral every 6 hours PRN Moderate to Severe Pain      LABS:	 	                          15.4   11.00 )-----------( 255      ( 20 May 2018 05:38 )             46.3   05-20    139  |  104  |  9   ----------------------------<  135<H>  3.9   |  21<L>  |  0.92    Ca    8.8      20 May 2018 05:38  Phos  2.8     05-20  Mg     2.5     05-20    TPro  6.7  /  Alb  3.6  /  TBili  0.4  /  DBili  x   /  AST  16  /  ALT  25  /  AlkPhos  62  05-20    PT/INR - ( 20 May 2018 05:38 )   PT: 13.8 SEC;   INR: 1.20     PTT - ( 20 May 2018 05:38 )  PTT:36.5 SEC                    < from: US Duplex Venous Lower Ext Complete, Bilateral (05.18.18 @ 11:04) >  EXAM:  US DPLX LWR EXT VEINS COMPL BI        PROCEDURE DATE:  May 18 2018         INTERPRETATION:  CLINICAL INFORMATION: Left lower extremity pain and   swelling.    COMPARISON: None available.    TECHNIQUE: Duplex sonography of the BILATERAL LOWERextremities with   color and spectral Doppler, with and without compression.      FINDINGS:    There is normal compressibility of the bilateral common femoral, femoral,   gastrocnemius and popliteal veins. There is thrombosis of the left   posterior tibial and the soleal veins. The left peroneal veins are   normal. Normal calf veins on the right. Doppler examination shows normal   spontaneous and phasic flow.  A small popliteal cyst on the left measuring 3.4 x 1.5 x 1.1 cm.    IMPRESSION:   Below-the-knee deep venous thrombosis on the left.  No evidence of deep venous thrombosis on the right.    Findings discussed with DR. JEB DEVLIN on 5/18/2018 11:15 AM with read   back.      SPRING RAMESH M.D. ATTENDING RADIOLOGIST  This document has been electronically signed. May 18 2018 11:15AM            < from: CT Angio Chest w/ IV Cont (05.18.18 @ 11:54) >  EXAM:  CT ANGIO CHEST (W)AW IC        PROCEDURE DATE:  May 18 2018         INTERPRETATION:  Clinical indications: DVT of the left lower extremity,   evaluate for pulmonary embolism.    CT pulmonary angiogram was performed following uncomplicated intravenous   administration of 90 cc of Omnipaque-350. 10 cc of contrast was   discarded. MIP images are submitted.    There are bilateral pulmonary arterial emboli. There is thrombus within   the distal aspect of the right pulmonary artery extending into the right   upper lobar, segmental and subsegmental pulmonary arterial branches.   There is a thrombus within the right interlobar pulmonary artery   extending into the right lower and right middle lobar, segmental and   subsegmental pulmonary arterial branches. There are segmental and   subsegmental PEs within the left upper lobe. Thrombus is also noted   within the left lower lobe pulmonary artery extending into the segmental   and subsegmental pulmonary arterial branches.    There are no pathologically enlarged bilateral axillary, mediastinal or   hilar lymph nodes. There are no pathologically enlarged bilateral   axillary, mediastinal or hilar lymph nodes. The heart size is normal.   There is no pericardial effusion. There is no evidence of right heart   strain. There are no pleural effusions.    Evaluation of the upper abdominal organs demonstrate fatty infiltration   of the liver.    Evaluation of the lungs demonstrate no evidence of pneumonia. There is a   2 mm nodule associatedwith the minor fissure unchanged since the prior   CT of November 10, 2007. There are no new lung nodules. There is no   interstitial lung disease. There are no central endobronchial lesions.    Evaluation of the bones and a straight no significant abnormality.    IMPRESSION: Extensive bilateral pulmonary arterial emboli as described   above.    Findings discussed with Dr. Devlin on May 18, 2018 at 12:13 PM with read   back.        MARIANO WASHINGTON M.D. ATTENDING RADIOLOGIST  This document has been electronically signed. May 18 2018 12:14PM            < from: CT Head No Cont (05.19.18 @ 03:19) >  EXAM:  CT BRAIN        PROCEDURE DATE:  May 19 2018         INTERPRETATION:  HISTORY: Headache    COMPARISON: CT head 5/10/2018    TECHNIQUE: Axial noncontrast CT images from the skull base to the vertex   were obtained and submitted for interpretation. Coronal and sagittal   reformatted images were performed. Bone and soft tissue windows were   evaluated.    FINDINGS:      Changes from cranioplasty along the vertex were again noted.    There is no acute intracranial mass-effect, hemorrhage, midline shift, or   abnormal extra-axial fluid collection.     Ventricles, sulci, and cisterns are normal in size for the patient's age   without hydrocephalus. Basal cisterns are patent.     Small right maxillary sinus fluid level is suggested. Remaining sinuses   are clear without air-fluid level.    IMPRESSION:     No acute intracranial bleeding, mass effect, or shift.     Small right maxillary sinus fluid level may reflect acute sinusitis in   the appropriate clinical setting.      REGINA GAFFNEY M.D., RADIOLOGY RESIDENT  This document has been electronically signed.  ART VALDEZ M.D., ATTENDING RADIOLOGIST  This document has been electronically signed. May 19 2018  5:09AM            < from: Transthoracic Echocardiogram (05.18.18 @ 16:08) >  Patient name: GEENA ROSALES  YOB: 1970   Age: 47 (M)   MR#: [id number]  Study Date: 5/18/2018  Location: O/PSonographer: Gunjan Santana [facility]  Study quality: Technically Fair  Referring Physician: Not Available Doctor, MD  Blood Pressure:141/95 mmHg  Height: 173 cm  Weight: 100 kg  BSA: 2.1 m2  ------------------------------------------------------------------------  PROCEDURE: Transthoracic echocardiogram with 2-D, M-Mode  and complete spectral and color flow Doppler.  INDICATION: Abnormal electrocardiogram (ECG) (EKG) (R94.31)  ------------------------------------------------------------------------  DIMENSIONS:  Dimensions:     Normal Values:  LA:     3.6 cm    2.0 - 4.0 cm  Ao:     3.0 cm    2.0 - 3.8 cm  SEPTUM: 0.8 cm    0.6 - 1.2 cm  PWT:    0.8 cm    0.6 - 1.1 cm  LVIDd:  4.8 cm    3.0 - 5.6 cm  LVIDs:  3.0 cm    1.8 - 4.0 cm  Derived Variables:  LVMI: 59 g/m2  RWT: 0.33  Fractional short: 38 %  Ejection Fraction (Teicholtz): 67 %  ------------------------------------------------------------------------  OBSERVATIONS:  Mitral Valve: Normal mitral valve. Minimal mitral  regurgitation.  Aortic Root: Normal aortic root.  Aortic Valve: Aortic valve leaflet morphology not well  visualized.  Left Atrium: Normal left atrium.  Left Ventricle: Normal left ventricular systolic function.  No segmental wall motion abnormalities. Normal left  ventricular internal dimensions and wall thicknesses.  Right Heart: Normal right atrium. The right ventricle is  not well visualized; grossly normal right ventricular  systolic function. Normal tricuspid valve. Minimal  tricuspid regurgitation. Normal pulmonic valve.  Pericardium/PleuraNormal pericardium with no pericardial  effusion.  ------------------------------------------------------------------------  CONCLUSIONS:  1. Normal mitral valve. Minimal mitral regurgitation.  2. Normal left ventricular internal dimensions and wall  thicknesses.  3. Normal left ventricular systolic function. No segmental  wall motion abnormalities.  4. The right ventricle is not well visualized; grossly  normal right ventricular systolic function.  *** Compared with echocardiogram of 11/24/2008, no  significant changes noted.  ------------------------------------------------------------------------  Confirmed on  5/18/2018 - 17:20:07 by Juventino Cerrato M.D.  ------------------------------------------------------------------------    < end of copied text >

## 2018-05-21 NOTE — PROGRESS NOTE ADULT - SUBJECTIVE AND OBJECTIVE BOX
Patient is a 47y old  Male who presents with a chief complaint of Left leg pain (19 May 2018 13:11)      SUBJECTIVE / OVERNIGHT EVENTS:    MEDICATIONS  (STANDING):  aspirin enteric coated 81 milliGRAM(s) Oral daily  dexlansoprazole DR 60 milliGRAM(s) Oral daily  hydroxyurea 1000 milliGRAM(s) Oral two times a day  insulin lispro (HumaLOG) corrective regimen sliding scale   SubCutaneous three times a day before meals  insulin lispro (HumaLOG) corrective regimen sliding scale   SubCutaneous at bedtime  nebivolol 10 milliGRAM(s) Oral daily  pregabalin 50 milliGRAM(s) Oral at bedtime  rivaroxaban 15 milliGRAM(s) Oral two times a day  tamsulosin 0.4 milliGRAM(s) Oral at bedtime  Topiramate ER Capsules 100 milliGRAM(s) 100 milliGRAM(s) Oral daily    MEDICATIONS  (PRN):  acetaminophen   Tablet. 650 milliGRAM(s) Oral every 6 hours PRN Mild Pain (1 - 3)  oxyCODONE    5 mG/acetaminophen 325 mG 1 Tablet(s) Oral every 6 hours PRN Moderate to Severe Pain      CAPILLARY BLOOD GLUCOSE      POCT Blood Glucose.: 118 mg/dL (21 May 2018 07:05)  POCT Blood Glucose.: 163 mg/dL (20 May 2018 22:34)  POCT Blood Glucose.: 168 mg/dL (20 May 2018 18:03)  POCT Blood Glucose.: 139 mg/dL (20 May 2018 12:13)  POCT Blood Glucose.: 105 mg/dL (20 May 2018 08:25)    I&O's Summary    Vital Signs Last 24 Hrs  T(C): 36.5 (21 May 2018 06:27), Max: 36.5 (20 May 2018 13:51)  T(F): 97.7 (21 May 2018 06:27), Max: 97.7 (20 May 2018 13:51)  HR: 68 (21 May 2018 06:27) (64 - 78)  BP: 124/82 (21 May 2018 06:27) (119/68 - 132/85)  BP(mean): --  RR: 18 (21 May 2018 06:27) (18 - 18)  SpO2: 99% (21 May 2018 06:27) (97% - 99%)    PHYSICAL EXAM:  GENERAL: NAD, well-developed  HEAD:  Atraumatic, Normocephalic  EYES: EOMI, PERRLA, conjunctiva and sclera clear  NECK: Supple, No JVD  CHEST/LUNG: Clear to auscultation bilaterally; No wheeze  HEART: Regular rate and rhythm; No murmurs, rubs, or gallops  ABDOMEN: Soft, Nontender, Nondistended; Bowel sounds present  EXTREMITIES:  2+ Peripheral Pulses. No erythema on LE or swelling. Calf equal in circumference  PSYCH: AAOx3  NEUROLOGY: non-focal, cranial nerves intact  SKIN: No rashes or lesions      LABS:                        13.9   8.31  )-----------( 239      ( 21 May 2018 06:37 )             42.3     Auto Eosinophil # x     / Auto Eosinophil % x     / Auto Neutrophil # x     / Auto Neutrophil % x     / BANDS % x                            15.4   11.00 )-----------( 255      ( 20 May 2018 05:38 )             46.3     Auto Eosinophil # 0.11  / Auto Eosinophil % 1.0   / Auto Neutrophil # 6.56  / Auto Neutrophil % 59.7  / BANDS % x        05-20    139  |  104  |  9   ----------------------------<  135<H>  3.9   |  21<L>  |  0.92    Ca    8.8      20 May 2018 05:38  Mg     2.5     05-20  Phos  2.8     05-20  TPro  6.7  /  Alb  3.6  /  TBili  0.4  /  DBili  x   /  AST  16  /  ALT  25  /  AlkPhos  62  05-20    PT/INR - ( 20 May 2018 05:38 )   PT: 13.8 SEC;   INR: 1.20          PTT - ( 20 May 2018 05:38 )  PTT:36.5 SEC            RESPIRATORY  VENT:    ABG:     VBG:     RADIOLOGY & ADDITIONAL TESTS:    Imaging Personally Reviewed:    Consultant(s) Notes Reviewed:      Care Discussed with Consultants/Other Providers: Patient is a 47y old  Male who presents with a chief complaint of Left leg pain (19 May 2018 13:11)      SUBJECTIVE / OVERNIGHT EVENTS:  No overnight events. Migraines are better controlled. No shortness of breath and chest pain. Continues to have LE pain.     MEDICATIONS  (STANDING):  aspirin enteric coated 81 milliGRAM(s) Oral daily  dexlansoprazole DR 60 milliGRAM(s) Oral daily  hydroxyurea 1000 milliGRAM(s) Oral two times a day  insulin lispro (HumaLOG) corrective regimen sliding scale   SubCutaneous three times a day before meals  insulin lispro (HumaLOG) corrective regimen sliding scale   SubCutaneous at bedtime  nebivolol 10 milliGRAM(s) Oral daily  pregabalin 50 milliGRAM(s) Oral at bedtime  rivaroxaban 15 milliGRAM(s) Oral two times a day  tamsulosin 0.4 milliGRAM(s) Oral at bedtime  Topiramate ER Capsules 100 milliGRAM(s) 100 milliGRAM(s) Oral daily    MEDICATIONS  (PRN):  acetaminophen   Tablet. 650 milliGRAM(s) Oral every 6 hours PRN Mild Pain (1 - 3)  oxyCODONE    5 mG/acetaminophen 325 mG 1 Tablet(s) Oral every 6 hours PRN Moderate to Severe Pain      CAPILLARY BLOOD GLUCOSE      POCT Blood Glucose.: 118 mg/dL (21 May 2018 07:05)  POCT Blood Glucose.: 163 mg/dL (20 May 2018 22:34)  POCT Blood Glucose.: 168 mg/dL (20 May 2018 18:03)  POCT Blood Glucose.: 139 mg/dL (20 May 2018 12:13)  POCT Blood Glucose.: 105 mg/dL (20 May 2018 08:25)    I&O's Summary    Vital Signs Last 24 Hrs  T(C): 36.5 (21 May 2018 06:27), Max: 36.5 (20 May 2018 13:51)  T(F): 97.7 (21 May 2018 06:27), Max: 97.7 (20 May 2018 13:51)  HR: 68 (21 May 2018 06:27) (64 - 78)  BP: 124/82 (21 May 2018 06:27) (119/68 - 132/85)  BP(mean): --  RR: 18 (21 May 2018 06:27) (18 - 18)  SpO2: 99% (21 May 2018 06:27) (97% - 99%)    PHYSICAL EXAM:  GENERAL: NAD, well-developed  HEAD:  Atraumatic, Normocephalic  EYES: EOMI, PERRLA, conjunctiva and sclera clear  NECK: Supple, No JVD  CHEST/LUNG: Clear to auscultation bilaterally; No wheeze  HEART: Regular rate and rhythm; No murmurs, rubs, or gallops  ABDOMEN: Soft, Nontender, Nondistended; Bowel sounds present  EXTREMITIES:  2+ Peripheral Pulses. No erythema on LE or swelling. Calf equal in circumference  PSYCH: AAOx3  NEUROLOGY: non-focal, cranial nerves intact  SKIN: No rashes or lesions      LABS:                        13.9   8.31  )-----------( 239      ( 21 May 2018 06:37 )             42.3     Auto Eosinophil # x     / Auto Eosinophil % x     / Auto Neutrophil # x     / Auto Neutrophil % x     / BANDS % x                            15.4   11.00 )-----------( 255      ( 20 May 2018 05:38 )             46.3     Auto Eosinophil # 0.11  / Auto Eosinophil % 1.0   / Auto Neutrophil # 6.56  / Auto Neutrophil % 59.7  / BANDS % x        05-20    139  |  104  |  9   ----------------------------<  135<H>  3.9   |  21<L>  |  0.92    Ca    8.8      20 May 2018 05:38  Mg     2.5     05-20  Phos  2.8     05-20  TPro  6.7  /  Alb  3.6  /  TBili  0.4  /  DBili  x   /  AST  16  /  ALT  25  /  AlkPhos  62  05-20    PT/INR - ( 20 May 2018 05:38 )   PT: 13.8 SEC;   INR: 1.20          PTT - ( 20 May 2018 05:38 )  PTT:36.5 SEC            RESPIRATORY  VENT:    ABG:     VBG:     RADIOLOGY & ADDITIONAL TESTS:    Imaging Personally Reviewed:    Consultant(s) Notes Reviewed:      Care Discussed with Consultants/Other Providers: Patient is a 47y old  Male who presents with a chief complaint of Left leg pain (19 May 2018 13:11)      SUBJECTIVE / OVERNIGHT EVENTS:  No overnight events. Migraines are better controlled. No shortness of breath and chest pain. Continues to have LE pain.     MEDICATIONS  (STANDING):  aspirin enteric coated 81 milliGRAM(s) Oral daily  dexlansoprazole DR 60 milliGRAM(s) Oral daily  hydroxyurea 1000 milliGRAM(s) Oral two times a day  insulin lispro (HumaLOG) corrective regimen sliding scale   SubCutaneous three times a day before meals  insulin lispro (HumaLOG) corrective regimen sliding scale   SubCutaneous at bedtime  nebivolol 10 milliGRAM(s) Oral daily  pregabalin 50 milliGRAM(s) Oral at bedtime  rivaroxaban 15 milliGRAM(s) Oral two times a day  tamsulosin 0.4 milliGRAM(s) Oral at bedtime  Topiramate ER Capsules 100 milliGRAM(s) 100 milliGRAM(s) Oral daily    MEDICATIONS  (PRN):  acetaminophen   Tablet. 650 milliGRAM(s) Oral every 6 hours PRN Mild Pain (1 - 3)  oxyCODONE    5 mG/acetaminophen 325 mG 1 Tablet(s) Oral every 6 hours PRN Moderate to Severe Pain      CAPILLARY BLOOD GLUCOSE      POCT Blood Glucose.: 118 mg/dL (21 May 2018 07:05)  POCT Blood Glucose.: 163 mg/dL (20 May 2018 22:34)  POCT Blood Glucose.: 168 mg/dL (20 May 2018 18:03)  POCT Blood Glucose.: 139 mg/dL (20 May 2018 12:13)  POCT Blood Glucose.: 105 mg/dL (20 May 2018 08:25)    I&O's Summary    Vital Signs Last 24 Hrs  T(C): 36.5 (21 May 2018 06:27), Max: 36.5 (20 May 2018 13:51)  T(F): 97.7 (21 May 2018 06:27), Max: 97.7 (20 May 2018 13:51)  HR: 68 (21 May 2018 06:27) (64 - 78)  BP: 124/82 (21 May 2018 06:27) (119/68 - 132/85)  BP(mean): --  RR: 18 (21 May 2018 06:27) (18 - 18)  SpO2: 99% (21 May 2018 06:27) (97% - 99%)    PHYSICAL EXAM:  GENERAL: NAD, well-developed  HEAD:  Atraumatic, Normocephalic  EYES: EOMI, PERRLA, conjunctiva and sclera clear  NECK: Supple, No JVD  CHEST/LUNG: Clear to auscultation bilaterally; No wheeze  HEART: Regular rate and rhythm; No murmurs, rubs, or gallops  ABDOMEN: Soft, Nontender, Nondistended; Bowel sounds present  EXTREMITIES:  2+ Peripheral Pulses. No erythema on LE or swelling. Calf equal in circumference  PSYCH: AAOx3  NEUROLOGY: non-focal, cranial nerves intact  SKIN: No rashes or lesions      LABS:                        13.9   8.31  )-----------( 239      ( 21 May 2018 06:37 )             42.3     Auto Eosinophil # x     / Auto Eosinophil % x     / Auto Neutrophil # x     / Auto Neutrophil % x     / BANDS % x                            15.4   11.00 )-----------( 255      ( 20 May 2018 05:38 )             46.3     Auto Eosinophil # 0.11  / Auto Eosinophil % 1.0   / Auto Neutrophil # 6.56  / Auto Neutrophil % 59.7  / BANDS % x        05-20    139  |  104  |  9   ----------------------------<  135<H>  3.9   |  21<L>  |  0.92    Ca    8.8      20 May 2018 05:38  Mg     2.5     05-20  Phos  2.8     05-20  TPro  6.7  /  Alb  3.6  /  TBili  0.4  /  DBili  x   /  AST  16  /  ALT  25  /  AlkPhos  62  05-20    PT/INR - ( 20 May 2018 05:38 )   PT: 13.8 SEC;   INR: 1.20          PTT - ( 20 May 2018 05:38 )  PTT:36.5 SEC            RESPIRATORY  VENT:    ABG:     VBG:     RADIOLOGY & ADDITIONAL TESTS:    Imaging Personally Reviewed:    Consultant(s) Notes Reviewed:  Hematology/Oncology    Care Discussed with Consultants/Other Providers:

## 2018-05-21 NOTE — PROGRESS NOTE ADULT - PROBLEM SELECTOR PLAN 4
Currently well controlled  Continue with Bystolic 10 mg once daily Currently, headache is resolved. HA 2/2 to known migraine disorder.    - neuro following  -Continue with tramadol 300 qhs per home dose  -Continue with Lyrica 50 mg BID  - Neuro recommending CT venogram in setting of new dvt/pe Currently, headache is resolved. HA 2/2 to known migraine disorder.    - neuro following  - c/w topiramate 100 mg PO daily,   - c/w Lyrica 50 mg BID  - c/w percocet q6 hr   - Neuro recommending CT venogram in setting of new dvt/pe

## 2018-05-21 NOTE — PROGRESS NOTE ADULT - PROBLEM SELECTOR PLAN 3
Patient follows with Dr. Cosme at [facility].  - hematology consulted  - c/w hydrea 1 g BID and aspirin daily Patient follows with Dr. Cosme at [facility]. Hct now improving.   - hematology consulted  - c/w hydrea 1 g BID and aspirin daily

## 2018-05-21 NOTE — DISCHARGE NOTE ADULT - PATIENT PORTAL LINK FT
You can access the [facility] Patient Portal, offered by [facility], by registering with the following website: [email or web address]

## 2018-05-21 NOTE — PROGRESS NOTE ADULT - PROBLEM SELECTOR PLAN 6
Currently, headache is resolved. HA 2/2 to known migraine disorder.    - neuro following  -Continue with tramadol 300 qhs per home dose  -Continue with Lyrica 50 mg BID HbA1c 8.6; Hold home Glimepiride  - c/w ISS and FS qac and qhs

## 2018-05-21 NOTE — DISCHARGE NOTE ADULT - MEDICATION SUMMARY - MEDICATIONS TO CHANGE
I will SWITCH the dose or number of times a day I take the medications listed below when I get home from the hospital:  None
all other ROS negative except as per HPI

## 2018-05-29 LAB — MISCELLANEOUS - CHEM: SIGNIFICANT CHANGE UP

## 2018-05-31 LAB — MISCELLANEOUS - CHEM: SIGNIFICANT CHANGE UP

## 2018-06-08 ENCOUNTER — OUTPATIENT (OUTPATIENT)
Dept: OUTPATIENT SERVICES | Facility: HOSPITAL | Age: 48
LOS: 1 days | End: 2018-06-08
Payer: COMMERCIAL

## 2018-06-08 ENCOUNTER — LABORATORY RESULT (OUTPATIENT)
Age: 48
End: 2018-06-08

## 2018-06-08 ENCOUNTER — APPOINTMENT (OUTPATIENT)
Dept: HEMATOLOGY ONCOLOGY | Facility: CLINIC | Age: 48
End: 2018-06-08
Payer: COMMERCIAL

## 2018-06-08 ENCOUNTER — RESULT REVIEW (OUTPATIENT)
Age: 48
End: 2018-06-08

## 2018-06-08 ENCOUNTER — OUTPATIENT (OUTPATIENT)
Dept: OUTPATIENT SERVICES | Facility: HOSPITAL | Age: 48
LOS: 1 days | Discharge: HOME | End: 2018-06-08

## 2018-06-08 VITALS
TEMPERATURE: 208.94 F | OXYGEN SATURATION: 98 % | WEIGHT: 224.87 LBS | RESPIRATION RATE: 16 BRPM | BODY MASS INDEX: 32.93 KG/M2 | SYSTOLIC BLOOD PRESSURE: 127 MMHG | DIASTOLIC BLOOD PRESSURE: 83 MMHG | HEART RATE: 73 BPM

## 2018-06-08 DIAGNOSIS — Z98.1 ARTHRODESIS STATUS: Chronic | ICD-10-CM

## 2018-06-08 DIAGNOSIS — D64.9 ANEMIA, UNSPECIFIED: ICD-10-CM

## 2018-06-08 DIAGNOSIS — Z98.890 OTHER SPECIFIED POSTPROCEDURAL STATES: Chronic | ICD-10-CM

## 2018-06-08 LAB
BASOPHILS # BLD AUTO: 0 K/UL — SIGNIFICANT CHANGE UP (ref 0–0.2)
BASOPHILS NFR BLD AUTO: 0.5 % — SIGNIFICANT CHANGE UP (ref 0–2)
EOSINOPHIL # BLD AUTO: 0 K/UL — SIGNIFICANT CHANGE UP (ref 0–0.5)
EOSINOPHIL NFR BLD AUTO: 0 % — SIGNIFICANT CHANGE UP (ref 0–6)
HCT VFR BLD CALC: 42 % — SIGNIFICANT CHANGE UP (ref 39–50)
HGB BLD-MCNC: 14.8 G/DL — SIGNIFICANT CHANGE UP (ref 13–17)
LYMPHOCYTES # BLD AUTO: 2.8 K/UL — SIGNIFICANT CHANGE UP (ref 1–3.3)
LYMPHOCYTES # BLD AUTO: 31.4 % — SIGNIFICANT CHANGE UP (ref 13–44)
MCHC RBC-ENTMCNC: 29.1 PG — SIGNIFICANT CHANGE UP (ref 27–34)
MCHC RBC-ENTMCNC: 35.1 G/DL — SIGNIFICANT CHANGE UP (ref 32–36)
MCV RBC AUTO: 82.8 FL — SIGNIFICANT CHANGE UP (ref 80–100)
MONOCYTES # BLD AUTO: 0.6 K/UL — SIGNIFICANT CHANGE UP (ref 0–0.9)
MONOCYTES NFR BLD AUTO: 6.2 % — SIGNIFICANT CHANGE UP (ref 2–14)
NEUTROPHILS # BLD AUTO: 5.6 K/UL — SIGNIFICANT CHANGE UP (ref 1.8–7.4)
NEUTROPHILS NFR BLD AUTO: 61.9 % — SIGNIFICANT CHANGE UP (ref 43–77)
PLATELET # BLD AUTO: 264 K/UL — SIGNIFICANT CHANGE UP (ref 150–400)
RBC # BLD: 5.08 M/UL — SIGNIFICANT CHANGE UP (ref 4.2–5.8)
RBC # FLD: 15.9 % — HIGH (ref 10.3–14.5)
WBC # BLD: 9 K/UL — SIGNIFICANT CHANGE UP (ref 3.8–10.5)
WBC # FLD AUTO: 9 K/UL — SIGNIFICANT CHANGE UP (ref 3.8–10.5)

## 2018-06-08 PROCEDURE — 81241 F5 GENE: CPT

## 2018-06-08 PROCEDURE — G0452: CPT | Mod: 26

## 2018-06-08 PROCEDURE — 99215 OFFICE O/P EST HI 40 MIN: CPT

## 2018-06-08 PROCEDURE — 81240 F2 GENE: CPT

## 2018-06-09 PROBLEM — G43.909 MIGRAINE, UNSPECIFIED, NOT INTRACTABLE, WITHOUT STATUS MIGRAINOSUS: Chronic | Status: ACTIVE | Noted: 2018-05-19

## 2018-06-09 PROBLEM — R42 DIZZINESS AND GIDDINESS: Chronic | Status: ACTIVE | Noted: 2018-05-18

## 2018-06-13 LAB — DNA PLOIDY SPEC FC-IMP: SIGNIFICANT CHANGE UP

## 2018-06-15 LAB — PTR INTERPRETATION: SIGNIFICANT CHANGE UP

## 2018-06-21 ENCOUNTER — FORM ENCOUNTER (OUTPATIENT)
Age: 48
End: 2018-06-21

## 2018-06-22 ENCOUNTER — APPOINTMENT (OUTPATIENT)
Dept: CT IMAGING | Facility: IMAGING CENTER | Age: 48
End: 2018-06-22
Payer: COMMERCIAL

## 2018-06-22 ENCOUNTER — OUTPATIENT (OUTPATIENT)
Dept: OUTPATIENT SERVICES | Facility: HOSPITAL | Age: 48
LOS: 1 days | End: 2018-06-22
Payer: COMMERCIAL

## 2018-06-22 DIAGNOSIS — I26.99 OTHER PULMONARY EMBOLISM WITHOUT ACUTE COR PULMONALE: ICD-10-CM

## 2018-06-22 DIAGNOSIS — Z98.890 OTHER SPECIFIED POSTPROCEDURAL STATES: Chronic | ICD-10-CM

## 2018-06-22 DIAGNOSIS — Z98.1 ARTHRODESIS STATUS: Chronic | ICD-10-CM

## 2018-06-22 PROCEDURE — 74177 CT ABD & PELVIS W/CONTRAST: CPT

## 2018-06-22 PROCEDURE — 74177 CT ABD & PELVIS W/CONTRAST: CPT | Mod: 26

## 2018-06-22 PROCEDURE — 82565 ASSAY OF CREATININE: CPT

## 2018-07-02 NOTE — ED CDU PROVIDER SUBSEQUENT DAY NOTE - PROGRESS NOTE DETAILS
What Is The Reason For Today's Visit?: Full Body Skin Examination What Is The Reason For Today's Visit? (Being Monitored For X): concerning skin lesions on an annual basis How Severe Are Your Spot(S)?: moderate Neuro evaluated pt; confirmed med regimen originally advised as per Consultation section comments of this note.  Pt. objectively appears comfortable and stable at present; pt actively being signed out to the CDU day PA and attending. Del: Supervised RAMON Barlow 5/19/18

## 2018-07-25 ENCOUNTER — RX RENEWAL (OUTPATIENT)
Age: 48
End: 2018-07-25

## 2018-07-27 ENCOUNTER — MEDICATION RENEWAL (OUTPATIENT)
Age: 48
End: 2018-07-27

## 2018-07-27 RX ORDER — GLIMEPIRIDE 2 MG/1
2 TABLET ORAL DAILY
Qty: 30 | Refills: 5 | Status: DISCONTINUED | COMMUNITY
Start: 2018-03-09 | End: 2018-07-27

## 2018-09-17 RX ORDER — CANAGLIFLOZIN 100 MG/1
100 TABLET, FILM COATED ORAL
Refills: 0 | Status: COMPLETED | COMMUNITY
End: 2017-05-08

## 2018-10-18 ENCOUNTER — MEDICATION RENEWAL (OUTPATIENT)
Age: 48
End: 2018-10-18

## 2018-10-30 ENCOUNTER — FORM ENCOUNTER (OUTPATIENT)
Age: 48
End: 2018-10-30

## 2018-10-31 ENCOUNTER — APPOINTMENT (OUTPATIENT)
Dept: CT IMAGING | Facility: IMAGING CENTER | Age: 48
End: 2018-10-31
Payer: COMMERCIAL

## 2018-10-31 ENCOUNTER — OUTPATIENT (OUTPATIENT)
Dept: OUTPATIENT SERVICES | Facility: HOSPITAL | Age: 48
LOS: 1 days | End: 2018-10-31
Payer: COMMERCIAL

## 2018-10-31 DIAGNOSIS — Z98.1 ARTHRODESIS STATUS: Chronic | ICD-10-CM

## 2018-10-31 DIAGNOSIS — Z98.890 OTHER SPECIFIED POSTPROCEDURAL STATES: Chronic | ICD-10-CM

## 2018-10-31 DIAGNOSIS — I26.99 OTHER PULMONARY EMBOLISM WITHOUT ACUTE COR PULMONALE: ICD-10-CM

## 2018-10-31 PROCEDURE — 82565 ASSAY OF CREATININE: CPT

## 2018-10-31 PROCEDURE — 71275 CT ANGIOGRAPHY CHEST: CPT | Mod: 26

## 2018-10-31 PROCEDURE — 71275 CT ANGIOGRAPHY CHEST: CPT

## 2018-11-01 ENCOUNTER — RX RENEWAL (OUTPATIENT)
Age: 48
End: 2018-11-01

## 2018-11-13 ENCOUNTER — RESULT REVIEW (OUTPATIENT)
Age: 48
End: 2018-11-13

## 2018-11-13 ENCOUNTER — OUTPATIENT (OUTPATIENT)
Dept: OUTPATIENT SERVICES | Facility: HOSPITAL | Age: 48
LOS: 1 days | Discharge: HOME | End: 2018-11-13

## 2018-11-13 ENCOUNTER — APPOINTMENT (OUTPATIENT)
Dept: HEMATOLOGY ONCOLOGY | Facility: CLINIC | Age: 48
End: 2018-11-13

## 2018-11-13 DIAGNOSIS — D64.9 ANEMIA, UNSPECIFIED: ICD-10-CM

## 2018-11-13 DIAGNOSIS — Z98.890 OTHER SPECIFIED POSTPROCEDURAL STATES: Chronic | ICD-10-CM

## 2018-11-13 DIAGNOSIS — Z98.1 ARTHRODESIS STATUS: Chronic | ICD-10-CM

## 2018-11-13 LAB
BASOPHILS # BLD AUTO: 0 K/UL — SIGNIFICANT CHANGE UP (ref 0–0.2)
BASOPHILS NFR BLD AUTO: 0.4 % — SIGNIFICANT CHANGE UP (ref 0–2)
EOSINOPHIL # BLD AUTO: 0.2 K/UL — SIGNIFICANT CHANGE UP (ref 0–0.5)
EOSINOPHIL NFR BLD AUTO: 2.4 % — SIGNIFICANT CHANGE UP (ref 0–6)
HCT VFR BLD CALC: 42.7 % — SIGNIFICANT CHANGE UP (ref 39–50)
HGB BLD-MCNC: 14.8 G/DL — SIGNIFICANT CHANGE UP (ref 13–17)
LYMPHOCYTES # BLD AUTO: 1.9 K/UL — SIGNIFICANT CHANGE UP (ref 1–3.3)
LYMPHOCYTES # BLD AUTO: 29.2 % — SIGNIFICANT CHANGE UP (ref 13–44)
MCHC RBC-ENTMCNC: 28.2 PG — SIGNIFICANT CHANGE UP (ref 27–34)
MCHC RBC-ENTMCNC: 34.7 G/DL — SIGNIFICANT CHANGE UP (ref 32–36)
MCV RBC AUTO: 81.3 FL — SIGNIFICANT CHANGE UP (ref 80–100)
MONOCYTES # BLD AUTO: 0.5 K/UL — SIGNIFICANT CHANGE UP (ref 0–0.9)
MONOCYTES NFR BLD AUTO: 7 % — SIGNIFICANT CHANGE UP (ref 2–14)
NEUTROPHILS # BLD AUTO: 4 K/UL — SIGNIFICANT CHANGE UP (ref 1.8–7.4)
NEUTROPHILS NFR BLD AUTO: 61 % — SIGNIFICANT CHANGE UP (ref 43–77)
PLATELET # BLD AUTO: 203 K/UL — SIGNIFICANT CHANGE UP (ref 150–400)
RBC # BLD: 5.25 M/UL — SIGNIFICANT CHANGE UP (ref 4.2–5.8)
RBC # FLD: 14.5 % — SIGNIFICANT CHANGE UP (ref 10.3–14.5)
WBC # BLD: 6.6 K/UL — SIGNIFICANT CHANGE UP (ref 3.8–10.5)
WBC # FLD AUTO: 6.6 K/UL — SIGNIFICANT CHANGE UP (ref 3.8–10.5)

## 2018-12-13 ENCOUNTER — OUTPATIENT (OUTPATIENT)
Dept: OUTPATIENT SERVICES | Facility: HOSPITAL | Age: 48
LOS: 1 days | Discharge: HOME | End: 2018-12-13

## 2018-12-13 DIAGNOSIS — Z98.890 OTHER SPECIFIED POSTPROCEDURAL STATES: Chronic | ICD-10-CM

## 2018-12-13 DIAGNOSIS — Z98.1 ARTHRODESIS STATUS: Chronic | ICD-10-CM

## 2018-12-13 DIAGNOSIS — D64.9 ANEMIA, UNSPECIFIED: ICD-10-CM

## 2018-12-19 ENCOUNTER — APPOINTMENT (OUTPATIENT)
Dept: HEMATOLOGY ONCOLOGY | Facility: CLINIC | Age: 48
End: 2018-12-19
Payer: COMMERCIAL

## 2018-12-19 ENCOUNTER — RESULT REVIEW (OUTPATIENT)
Age: 48
End: 2018-12-19

## 2018-12-19 ENCOUNTER — LABORATORY RESULT (OUTPATIENT)
Age: 48
End: 2018-12-19

## 2018-12-19 VITALS
TEMPERATURE: 207.86 F | WEIGHT: 234.13 LBS | RESPIRATION RATE: 16 BRPM | HEART RATE: 79 BPM | BODY MASS INDEX: 34.28 KG/M2 | OXYGEN SATURATION: 95 % | SYSTOLIC BLOOD PRESSURE: 112 MMHG | DIASTOLIC BLOOD PRESSURE: 72 MMHG

## 2018-12-19 DIAGNOSIS — I26.99 OTHER PULMONARY EMBOLISM W/OUT ACUTE COR PULMONALE: ICD-10-CM

## 2018-12-19 LAB
BASOPHILS # BLD AUTO: 0 K/UL — SIGNIFICANT CHANGE UP (ref 0–0.2)
BASOPHILS NFR BLD AUTO: 0 % — SIGNIFICANT CHANGE UP (ref 0–2)
EOSINOPHIL # BLD AUTO: 0.1 K/UL — SIGNIFICANT CHANGE UP (ref 0–0.5)
EOSINOPHIL NFR BLD AUTO: 0.9 % — SIGNIFICANT CHANGE UP (ref 0–6)
HCT VFR BLD CALC: 44.3 % — SIGNIFICANT CHANGE UP (ref 39–50)
HGB BLD-MCNC: 15.6 G/DL — SIGNIFICANT CHANGE UP (ref 13–17)
LYMPHOCYTES # BLD AUTO: 2.1 K/UL — SIGNIFICANT CHANGE UP (ref 1–3.3)
LYMPHOCYTES # BLD AUTO: 28.4 % — SIGNIFICANT CHANGE UP (ref 13–44)
MCHC RBC-ENTMCNC: 28.1 PG — SIGNIFICANT CHANGE UP (ref 27–34)
MCHC RBC-ENTMCNC: 35.2 G/DL — SIGNIFICANT CHANGE UP (ref 32–36)
MCV RBC AUTO: 79.9 FL — LOW (ref 80–100)
MONOCYTES # BLD AUTO: 0.3 K/UL — SIGNIFICANT CHANGE UP (ref 0–0.9)
MONOCYTES NFR BLD AUTO: 4.4 % — SIGNIFICANT CHANGE UP (ref 2–14)
NEUTROPHILS # BLD AUTO: 4.9 K/UL — SIGNIFICANT CHANGE UP (ref 1.8–7.4)
NEUTROPHILS NFR BLD AUTO: 66.3 % — SIGNIFICANT CHANGE UP (ref 43–77)
PLATELET # BLD AUTO: 227 K/UL — SIGNIFICANT CHANGE UP (ref 150–400)
RBC # BLD: 5.54 M/UL — SIGNIFICANT CHANGE UP (ref 4.2–5.8)
RBC # FLD: 14.3 % — SIGNIFICANT CHANGE UP (ref 10.3–14.5)
WBC # BLD: 7.4 K/UL — SIGNIFICANT CHANGE UP (ref 3.8–10.5)
WBC # FLD AUTO: 7.4 K/UL — SIGNIFICANT CHANGE UP (ref 3.8–10.5)

## 2018-12-19 PROCEDURE — 99214 OFFICE O/P EST MOD 30 MIN: CPT

## 2019-01-04 ENCOUNTER — MEDICATION RENEWAL (OUTPATIENT)
Age: 49
End: 2019-01-04

## 2019-01-22 PROBLEM — I26.99 PULMONARY EMBOLISM: Status: ACTIVE | Noted: 2018-06-11

## 2019-01-22 NOTE — REVIEW OF SYSTEMS
[Recent Change In Weight] : ~T recent weight change [Negative] : Respiratory [FreeTextEntry2] : + 10 lbs

## 2019-01-22 NOTE — ASSESSMENT
[FreeTextEntry1] : This is a 48 year old male with long standing polycythemia, Shashank 2 negative.\par His last phlebotomy was in 8/2014.  He likely has secondary polycythemia as it had improved after his smoking cessation +/- weight loss.  \par There is no need to phlebotomize at this point as his counts have stabilized. \par He now presented with unprovoked extensive bilateral pulmonary emboli- uncertain etiology.  He has had no evidence of malignancy, recent GI work up.  \par Hypercoagulable work up consistent with heterozygous prothrombin gene mutation. \par Repeat scan with resolution of PE. \par Though this is his first episode of clot, even with a potential hypercoagulable state does not subject to lifelong anticoagulation. \par Will plan to stop xarelto, follow ddimer levels.  If increasing or any concern of recurrence, will begin prophylactic anticoagulation with eliquis 2.5 mg twice a day.  \par  Counseled that he may need low-dose blood thinner on long flights, etc...recommended low dose Eliquis 2.5 mg prn. \par Repeat chest CT w/o contrast in 2/2019 to follow up on ground glass opacity seen on CT. \par He will follow up in 1 month.

## 2019-01-22 NOTE — HISTORY OF PRESENT ILLNESS
[de-identified] : HOUSTON-2 neg polycythemia- last phlebotomy 8/2014 [de-identified] : Patient presents for a follow up appointment. He continues to take Xarelto 20 mg daily, tolerates well. However, he notes weight gain with medication, +10 lbs. Notes he will be undergoing procedures in the future and wishes to discontinue blood thinner. He is scheduled for radiofrequency ablation. CT abd/pelv from 06/22/18 revealed no evidence of intra-abdominal malignancy from previous thromboembolism. His chest CT from 10/31/18 revealed no pulmonary emboli. Denies any fever/chills, headaches, visual changes, chest pain, cough, abdominal pain, N/V/D, bruising, or bleeding.\par

## 2019-01-22 NOTE — ADDENDUM
[FreeTextEntry1] : Documented by Mackenzie Charles acting as a scribe for Dr. Marilee Cosme on 12/19/2018.\par \par All medical record entries made by the Scribe were at my, Dr. Marilee Cosme's, direction and personally dictated by me on 12/19/2018. I have reviewed the chart and agree that  the record accurately reflects my personal performance of the history, physical exam, assessment and plan. I have also personally directed, reviewed, and agree with the discharge instructions.\par

## 2019-01-24 ENCOUNTER — APPOINTMENT (OUTPATIENT)
Dept: HEMATOLOGY ONCOLOGY | Facility: CLINIC | Age: 49
End: 2019-01-24

## 2019-01-24 ENCOUNTER — RESULT REVIEW (OUTPATIENT)
Age: 49
End: 2019-01-24

## 2019-01-24 ENCOUNTER — OUTPATIENT (OUTPATIENT)
Dept: OUTPATIENT SERVICES | Facility: HOSPITAL | Age: 49
LOS: 1 days | Discharge: HOME | End: 2019-01-24

## 2019-01-24 ENCOUNTER — LABORATORY RESULT (OUTPATIENT)
Age: 49
End: 2019-01-24

## 2019-01-24 DIAGNOSIS — Z98.890 OTHER SPECIFIED POSTPROCEDURAL STATES: Chronic | ICD-10-CM

## 2019-01-24 DIAGNOSIS — D64.9 ANEMIA, UNSPECIFIED: ICD-10-CM

## 2019-01-24 DIAGNOSIS — Z98.1 ARTHRODESIS STATUS: Chronic | ICD-10-CM

## 2019-01-24 LAB
BASOPHILS # BLD AUTO: 0 K/UL — SIGNIFICANT CHANGE UP (ref 0–0.2)
BASOPHILS NFR BLD AUTO: 0.7 % — SIGNIFICANT CHANGE UP (ref 0–2)
EOSINOPHIL # BLD AUTO: 0.1 K/UL — SIGNIFICANT CHANGE UP (ref 0–0.5)
EOSINOPHIL NFR BLD AUTO: 2.1 % — SIGNIFICANT CHANGE UP (ref 0–6)
HCT VFR BLD CALC: 42.5 % — SIGNIFICANT CHANGE UP (ref 39–50)
HGB BLD-MCNC: 15.1 G/DL — SIGNIFICANT CHANGE UP (ref 13–17)
LYMPHOCYTES # BLD AUTO: 2.6 K/UL — SIGNIFICANT CHANGE UP (ref 1–3.3)
LYMPHOCYTES # BLD AUTO: 37.5 % — SIGNIFICANT CHANGE UP (ref 13–44)
MCHC RBC-ENTMCNC: 28.4 PG — SIGNIFICANT CHANGE UP (ref 27–34)
MCHC RBC-ENTMCNC: 35.5 G/DL — SIGNIFICANT CHANGE UP (ref 32–36)
MCV RBC AUTO: 79.9 FL — LOW (ref 80–100)
MONOCYTES # BLD AUTO: 0.5 K/UL — SIGNIFICANT CHANGE UP (ref 0–0.9)
MONOCYTES NFR BLD AUTO: 6.8 % — SIGNIFICANT CHANGE UP (ref 2–14)
NEUTROPHILS # BLD AUTO: 3.7 K/UL — SIGNIFICANT CHANGE UP (ref 1.8–7.4)
NEUTROPHILS NFR BLD AUTO: 52.9 % — SIGNIFICANT CHANGE UP (ref 43–77)
PLATELET # BLD AUTO: 236 K/UL — SIGNIFICANT CHANGE UP (ref 150–400)
RBC # BLD: 5.32 M/UL — SIGNIFICANT CHANGE UP (ref 4.2–5.8)
RBC # FLD: 13.7 % — SIGNIFICANT CHANGE UP (ref 10.3–14.5)
WBC # BLD: 7 K/UL — SIGNIFICANT CHANGE UP (ref 3.8–10.5)
WBC # FLD AUTO: 7 K/UL — SIGNIFICANT CHANGE UP (ref 3.8–10.5)

## 2019-02-14 ENCOUNTER — APPOINTMENT (OUTPATIENT)
Dept: UROLOGY | Facility: CLINIC | Age: 49
End: 2019-02-14
Payer: COMMERCIAL

## 2019-02-14 ENCOUNTER — OUTPATIENT (OUTPATIENT)
Dept: OUTPATIENT SERVICES | Facility: HOSPITAL | Age: 49
LOS: 1 days | End: 2019-02-14
Payer: COMMERCIAL

## 2019-02-14 DIAGNOSIS — Z98.890 OTHER SPECIFIED POSTPROCEDURAL STATES: Chronic | ICD-10-CM

## 2019-02-14 DIAGNOSIS — Z98.1 ARTHRODESIS STATUS: Chronic | ICD-10-CM

## 2019-02-14 PROCEDURE — 76870 US EXAM SCROTUM: CPT | Mod: 26

## 2019-02-14 PROCEDURE — 76870 US EXAM SCROTUM: CPT

## 2019-02-14 PROCEDURE — 93975 VASCULAR STUDY: CPT | Mod: 26

## 2019-02-14 NOTE — PHYSICAL EXAM
[General Appearance - Well Developed] : well developed [General Appearance - Well Nourished] : well nourished [Normal Appearance] : normal appearance [Well Groomed] : well groomed [General Appearance - In No Acute Distress] : no acute distress [Abdomen Soft] : soft [Urethral Meatus] : meatus normal [Urinary Bladder Findings] : the bladder was normal on palpation [Scrotum] : the scrotum was normal [Testes Mass (___cm)] : there were no testicular masses [] : no rash [Oriented To Time, Place, And Person] : oriented to person, place, and time [Affect] : the affect was normal [Mood] : the mood was normal [Not Anxious] : not anxious [Normal Station and Gait] : the gait and station were normal for the patient's age

## 2019-02-14 NOTE — HISTORY OF PRESENT ILLNESS
[FreeTextEntry1] : The patient returns for followup. He has rectal discomfort particularly after bowel movements. He states he has IBS and has diarrhea in the morning followed by rectal discomfort that extends to the perineum.\par \par PMH: Patient presented with the chief complaint of left epididymal pain for evaluation. The patient stated that he had a surgery to correct his Peyronies disease performed by Dr. Grabiel Ponce in 2005. He stated that he had a bilateral varicocele ligation in 2008, a L5-[id number] fusion in 2008 and a bilateral inguinal hernia repair performed in 2009.  Since having the procedure for Peyronies disease he has had left epididymal pain.  He returned to Dr. Grabiel Ponce who diagnosed him.  He has no known drug allergies.  His past medical history demonstrates no significant urologic issues.  He is  without children and does not have the desire to have children.  In his present occupation as a  for the [employer] system he has no known toxin exposure.  He does smoke and drinks only socially.  He has no known drug allergies.  His review of systems is non-contributory. His family history is not significant.\par

## 2019-02-14 NOTE — ASSESSMENT
[FreeTextEntry1] : The patient returns for followup. He has had a left epididymectomy and a bilateral microsurgical varicocele ligation. His pain was greater after the bilateral microsurgical varicocele ligation. A scrotal ultrasound was performed and was essentially normal. I've discussed with him the options in particular we discussed a cord block to see if his pain dissipates. We'll have him back for bilateral cord block of followup.

## 2019-02-15 DIAGNOSIS — N50.819 TESTICULAR PAIN, UNSPECIFIED: ICD-10-CM

## 2019-02-21 ENCOUNTER — OUTPATIENT (OUTPATIENT)
Dept: OUTPATIENT SERVICES | Facility: HOSPITAL | Age: 49
LOS: 1 days | End: 2019-02-21
Payer: COMMERCIAL

## 2019-02-21 ENCOUNTER — APPOINTMENT (OUTPATIENT)
Dept: UROLOGY | Facility: CLINIC | Age: 49
End: 2019-02-21
Payer: COMMERCIAL

## 2019-02-21 DIAGNOSIS — Z98.1 ARTHRODESIS STATUS: Chronic | ICD-10-CM

## 2019-02-21 DIAGNOSIS — Z98.890 OTHER SPECIFIED POSTPROCEDURAL STATES: Chronic | ICD-10-CM

## 2019-02-21 PROCEDURE — 64425 NJX AA&/STRD II IH NERVES: CPT | Mod: 50

## 2019-02-21 PROCEDURE — 99214 OFFICE O/P EST MOD 30 MIN: CPT | Mod: 25

## 2019-02-21 NOTE — END OF VISIT
[>50% of Time Spent on Counseling for ____] : Greater than 50% of the encounter time was spent on counseling for [unfilled] [>50% of Time Spent on Counseling and Coordination of Care for  ___] : Greater than 50% of the encounter time was spent on counseling and coordination of care for [unfilled] [Time Spent: ___ minutes] : I have spent [unfilled] minutes of face to face time with the patient

## 2019-02-21 NOTE — ASSESSMENT
[FreeTextEntry1] : The patient returns for followup and bilateral spermatic cord block. His pain has improved by greater than 75%. He stated that he feels "great".His left side is typically the worst side.  \par \par We discussed left microsurgical spermatic cord denervation. We discussed the relative risks and benefits including persistent pain and damage to his testis. he would like to proceed. He is not interested in having children. \par

## 2019-02-21 NOTE — HISTORY OF PRESENT ILLNESS
[FreeTextEntry1] : The patient returns for followup for bilateral cord block.\par \par PMH: Patient presented with the chief complaint of left epididymal pain for evaluation. The patient stated that he had a surgery to correct his Peyronies disease performed by Dr. Grabiel Ponce in 2005. He stated that he had a bilateral varicocele ligation in 2008, a L5-[id number] fusion in 2008 and a bilateral inguinal hernia repair performed in 2009.  Since having the procedure for Peyronies disease he has had left epididymal pain.  He returned to Dr. Grabiel Ponce who diagnosed him.  He has no known drug allergies.  His past medical history demonstrates no significant urologic issues.  He is  without children and does not have the desire to have children.  In his present occupation as a  for the [employer] system he has no known toxin exposure.  He does smoke and drinks only socially.  He has no known drug allergies.  His review of systems is non-contributory. His family history is not significant.\par

## 2019-02-21 NOTE — PHYSICAL EXAM
[General Appearance - Well Developed] : well developed [General Appearance - Well Nourished] : well nourished [Normal Appearance] : normal appearance [Well Groomed] : well groomed [General Appearance - In No Acute Distress] : no acute distress [Bowel Sounds] : normal bowel sounds [Abdomen Soft] : soft [Abdomen Tenderness] : non-tender [Costovertebral Angle Tenderness] : no ~M costovertebral angle tenderness [Urethral Meatus] : meatus normal [Urinary Bladder Findings] : the bladder was normal on palpation [Scrotum] : the scrotum was normal [Testes Mass (___cm)] : there were no testicular masses [No Prostate Nodules] : no prostate nodules [Edema] : no peripheral edema [] : no respiratory distress [Respiration, Rhythm And Depth] : normal respiratory rhythm and effort [Exaggerated Use Of Accessory Muscles For Inspiration] : no accessory muscle use [Oriented To Time, Place, And Person] : oriented to person, place, and time [Affect] : the affect was normal [Mood] : the mood was normal [Not Anxious] : not anxious [Normal Station and Gait] : the gait and station were normal for the patient's age [No Focal Deficits] : no focal deficits [No Palpable Adenopathy] : no palpable adenopathy

## 2019-02-26 DIAGNOSIS — N50.819 TESTICULAR PAIN, UNSPECIFIED: ICD-10-CM

## 2019-02-27 ENCOUNTER — RESULT REVIEW (OUTPATIENT)
Age: 49
End: 2019-02-27

## 2019-02-27 ENCOUNTER — APPOINTMENT (OUTPATIENT)
Dept: HEMATOLOGY ONCOLOGY | Facility: CLINIC | Age: 49
End: 2019-02-27

## 2019-02-27 ENCOUNTER — OUTPATIENT (OUTPATIENT)
Dept: OUTPATIENT SERVICES | Facility: HOSPITAL | Age: 49
LOS: 1 days | Discharge: HOME | End: 2019-02-27

## 2019-02-27 DIAGNOSIS — Z98.1 ARTHRODESIS STATUS: Chronic | ICD-10-CM

## 2019-02-27 DIAGNOSIS — D64.9 ANEMIA, UNSPECIFIED: ICD-10-CM

## 2019-02-27 DIAGNOSIS — Z98.890 OTHER SPECIFIED POSTPROCEDURAL STATES: Chronic | ICD-10-CM

## 2019-02-27 LAB
BASOPHILS # BLD AUTO: 0 K/UL — SIGNIFICANT CHANGE UP (ref 0–0.2)
BASOPHILS NFR BLD AUTO: 0.5 % — SIGNIFICANT CHANGE UP (ref 0–2)
EOSINOPHIL # BLD AUTO: 0.2 K/UL — SIGNIFICANT CHANGE UP (ref 0–0.5)
EOSINOPHIL NFR BLD AUTO: 2 % — SIGNIFICANT CHANGE UP (ref 0–6)
HCT VFR BLD CALC: 45.7 % — SIGNIFICANT CHANGE UP (ref 39–50)
HGB BLD-MCNC: 14.8 G/DL — SIGNIFICANT CHANGE UP (ref 13–17)
LYMPHOCYTES # BLD AUTO: 2.3 K/UL — SIGNIFICANT CHANGE UP (ref 1–3.3)
LYMPHOCYTES # BLD AUTO: 30.3 % — SIGNIFICANT CHANGE UP (ref 13–44)
MCHC RBC-ENTMCNC: 26.4 PG — LOW (ref 27–34)
MCHC RBC-ENTMCNC: 32.4 G/DL — SIGNIFICANT CHANGE UP (ref 32–36)
MCV RBC AUTO: 81.5 FL — SIGNIFICANT CHANGE UP (ref 80–100)
MONOCYTES # BLD AUTO: 0.5 K/UL — SIGNIFICANT CHANGE UP (ref 0–0.9)
MONOCYTES NFR BLD AUTO: 6.8 % — SIGNIFICANT CHANGE UP (ref 2–14)
NEUTROPHILS # BLD AUTO: 4.7 K/UL — SIGNIFICANT CHANGE UP (ref 1.8–7.4)
NEUTROPHILS NFR BLD AUTO: 60.4 % — SIGNIFICANT CHANGE UP (ref 43–77)
PLATELET # BLD AUTO: 234 K/UL — SIGNIFICANT CHANGE UP (ref 150–400)
RBC # BLD: 5.6 M/UL — SIGNIFICANT CHANGE UP (ref 4.2–5.8)
RBC # FLD: 14.4 % — SIGNIFICANT CHANGE UP (ref 10.3–14.5)
WBC # BLD: 7.7 K/UL — SIGNIFICANT CHANGE UP (ref 3.8–10.5)
WBC # FLD AUTO: 7.7 K/UL — SIGNIFICANT CHANGE UP (ref 3.8–10.5)

## 2019-03-13 ENCOUNTER — RESULT REVIEW (OUTPATIENT)
Age: 49
End: 2019-03-13

## 2019-03-13 ENCOUNTER — APPOINTMENT (OUTPATIENT)
Dept: INFUSION THERAPY | Facility: HOSPITAL | Age: 49
End: 2019-03-13

## 2019-03-13 LAB
BASOPHILS # BLD AUTO: 0 K/UL — SIGNIFICANT CHANGE UP (ref 0–0.2)
BASOPHILS NFR BLD AUTO: 0.5 % — SIGNIFICANT CHANGE UP (ref 0–2)
EOSINOPHIL # BLD AUTO: 0.3 K/UL — SIGNIFICANT CHANGE UP (ref 0–0.5)
EOSINOPHIL NFR BLD AUTO: 3.3 % — SIGNIFICANT CHANGE UP (ref 0–6)
HCT VFR BLD CALC: 43.6 % — SIGNIFICANT CHANGE UP (ref 39–50)
HGB BLD-MCNC: 16 G/DL — SIGNIFICANT CHANGE UP (ref 13–17)
LYMPHOCYTES # BLD AUTO: 2.6 K/UL — SIGNIFICANT CHANGE UP (ref 1–3.3)
LYMPHOCYTES # BLD AUTO: 29.9 % — SIGNIFICANT CHANGE UP (ref 13–44)
MCHC RBC-ENTMCNC: 29.9 PG — SIGNIFICANT CHANGE UP (ref 27–34)
MCHC RBC-ENTMCNC: 36.6 G/DL — HIGH (ref 32–36)
MCV RBC AUTO: 81.5 FL — SIGNIFICANT CHANGE UP (ref 80–100)
MONOCYTES # BLD AUTO: 0.5 K/UL — SIGNIFICANT CHANGE UP (ref 0–0.9)
MONOCYTES NFR BLD AUTO: 5.8 % — SIGNIFICANT CHANGE UP (ref 2–14)
NEUTROPHILS # BLD AUTO: 5.2 K/UL — SIGNIFICANT CHANGE UP (ref 1.8–7.4)
NEUTROPHILS NFR BLD AUTO: 60.6 % — SIGNIFICANT CHANGE UP (ref 43–77)
PLATELET # BLD AUTO: 237 K/UL — SIGNIFICANT CHANGE UP (ref 150–400)
RBC # BLD: 5.35 M/UL — SIGNIFICANT CHANGE UP (ref 4.2–5.8)
RBC # FLD: 14.6 % — HIGH (ref 10.3–14.5)
WBC # BLD: 8.6 K/UL — SIGNIFICANT CHANGE UP (ref 3.8–10.5)
WBC # FLD AUTO: 8.6 K/UL — SIGNIFICANT CHANGE UP (ref 3.8–10.5)

## 2019-03-14 LAB
ALBUMIN SERPL ELPH-MCNC: 4.5 G/DL
ALP BLD-CCNC: 64 U/L
ALT SERPL-CCNC: 53 U/L
ANION GAP SERPL CALC-SCNC: 14 MMOL/L
AST SERPL-CCNC: 35 U/L
BILIRUB SERPL-MCNC: 0.2 MG/DL
BUN SERPL-MCNC: 18 MG/DL
CALCIUM SERPL-MCNC: 9.3 MG/DL
CHLORIDE SERPL-SCNC: 105 MMOL/L
CO2 SERPL-SCNC: 23 MMOL/L
CREAT SERPL-MCNC: 0.87 MG/DL
DEPRECATED D DIMER PPP IA-ACNC: <150 NG/ML DDU
FERRITIN SERPL-MCNC: 19 NG/ML
GLUCOSE SERPL-MCNC: 107 MG/DL
IRON SATN MFR SERPL: 17 %
IRON SERPL-MCNC: 44 UG/DL
POTASSIUM SERPL-SCNC: 4.6 MMOL/L
PROT SERPL-MCNC: 6.7 G/DL
SODIUM SERPL-SCNC: 142 MMOL/L
TIBC SERPL-MCNC: 256 UG/DL
UIBC SERPL-MCNC: 212 UG/DL

## 2019-03-16 ENCOUNTER — EMERGENCY (EMERGENCY)
Facility: HOSPITAL | Age: 49
LOS: 1 days | Discharge: HOME | End: 2019-03-16
Attending: EMERGENCY MEDICINE | Admitting: EMERGENCY MEDICINE
Payer: OTHER MISCELLANEOUS

## 2019-03-16 VITALS
DIASTOLIC BLOOD PRESSURE: 98 MMHG | TEMPERATURE: 98 F | SYSTOLIC BLOOD PRESSURE: 145 MMHG | OXYGEN SATURATION: 97 % | RESPIRATION RATE: 15 BRPM | HEART RATE: 65 BPM

## 2019-03-16 DIAGNOSIS — Z98.1 ARTHRODESIS STATUS: Chronic | ICD-10-CM

## 2019-03-16 DIAGNOSIS — Z98.890 OTHER SPECIFIED POSTPROCEDURAL STATES: Chronic | ICD-10-CM

## 2019-03-16 LAB
ALBUMIN SERPL ELPH-MCNC: 4.3 G/DL — SIGNIFICANT CHANGE UP (ref 3.3–5)
ALP SERPL-CCNC: 78 U/L — SIGNIFICANT CHANGE UP (ref 40–120)
ALT FLD-CCNC: 69 U/L — HIGH (ref 4–41)
ANION GAP SERPL CALC-SCNC: 14 MMO/L — SIGNIFICANT CHANGE UP (ref 7–14)
APTT BLD: 29.2 SEC — SIGNIFICANT CHANGE UP (ref 27.5–36.3)
AST SERPL-CCNC: 37 U/L — SIGNIFICANT CHANGE UP (ref 4–40)
BASOPHILS # BLD AUTO: 0.05 K/UL — SIGNIFICANT CHANGE UP (ref 0–0.2)
BASOPHILS NFR BLD AUTO: 0.6 % — SIGNIFICANT CHANGE UP (ref 0–2)
BILIRUB SERPL-MCNC: 0.3 MG/DL — SIGNIFICANT CHANGE UP (ref 0.2–1.2)
BUN SERPL-MCNC: 10 MG/DL — SIGNIFICANT CHANGE UP (ref 7–23)
CALCIUM SERPL-MCNC: 9.5 MG/DL — SIGNIFICANT CHANGE UP (ref 8.4–10.5)
CHLORIDE SERPL-SCNC: 99 MMOL/L — SIGNIFICANT CHANGE UP (ref 98–107)
CO2 SERPL-SCNC: 24 MMOL/L — SIGNIFICANT CHANGE UP (ref 22–31)
CREAT SERPL-MCNC: 0.85 MG/DL — SIGNIFICANT CHANGE UP (ref 0.5–1.3)
D DIMER BLD IA.RAPID-MCNC: < 150 NG/ML — SIGNIFICANT CHANGE UP
EOSINOPHIL # BLD AUTO: 0.21 K/UL — SIGNIFICANT CHANGE UP (ref 0–0.5)
EOSINOPHIL NFR BLD AUTO: 2.5 % — SIGNIFICANT CHANGE UP (ref 0–6)
GLUCOSE SERPL-MCNC: 274 MG/DL — HIGH (ref 70–99)
HCT VFR BLD CALC: 45.5 % — SIGNIFICANT CHANGE UP (ref 39–50)
HGB BLD-MCNC: 15 G/DL — SIGNIFICANT CHANGE UP (ref 13–17)
IMM GRANULOCYTES NFR BLD AUTO: 0.4 % — SIGNIFICANT CHANGE UP (ref 0–1.5)
INR BLD: 1.03 — SIGNIFICANT CHANGE UP (ref 0.88–1.17)
LYMPHOCYTES # BLD AUTO: 3.11 K/UL — SIGNIFICANT CHANGE UP (ref 1–3.3)
LYMPHOCYTES # BLD AUTO: 37.3 % — SIGNIFICANT CHANGE UP (ref 13–44)
MCHC RBC-ENTMCNC: 27.6 PG — SIGNIFICANT CHANGE UP (ref 27–34)
MCHC RBC-ENTMCNC: 33 % — SIGNIFICANT CHANGE UP (ref 32–36)
MCV RBC AUTO: 83.8 FL — SIGNIFICANT CHANGE UP (ref 80–100)
MONOCYTES # BLD AUTO: 0.64 K/UL — SIGNIFICANT CHANGE UP (ref 0–0.9)
MONOCYTES NFR BLD AUTO: 7.7 % — SIGNIFICANT CHANGE UP (ref 2–14)
NEUTROPHILS # BLD AUTO: 4.29 K/UL — SIGNIFICANT CHANGE UP (ref 1.8–7.4)
NEUTROPHILS NFR BLD AUTO: 51.5 % — SIGNIFICANT CHANGE UP (ref 43–77)
NRBC # FLD: 0 K/UL — LOW (ref 25–125)
PLATELET # BLD AUTO: 230 K/UL — SIGNIFICANT CHANGE UP (ref 150–400)
PMV BLD: 9.4 FL — SIGNIFICANT CHANGE UP (ref 7–13)
POTASSIUM SERPL-MCNC: 4.1 MMOL/L — SIGNIFICANT CHANGE UP (ref 3.5–5.3)
POTASSIUM SERPL-SCNC: 4.1 MMOL/L — SIGNIFICANT CHANGE UP (ref 3.5–5.3)
PROT SERPL-MCNC: 6.8 G/DL — SIGNIFICANT CHANGE UP (ref 6–8.3)
PROTHROM AB SERPL-ACNC: 11.4 SEC — SIGNIFICANT CHANGE UP (ref 9.8–13.1)
RBC # BLD: 5.43 M/UL — SIGNIFICANT CHANGE UP (ref 4.2–5.8)
RBC # FLD: 15.1 % — HIGH (ref 10.3–14.5)
SODIUM SERPL-SCNC: 137 MMOL/L — SIGNIFICANT CHANGE UP (ref 135–145)
TROPONIN T, HIGH SENSITIVITY: < 6 NG/L — SIGNIFICANT CHANGE UP (ref ?–14)
WBC # BLD: 8.33 K/UL — SIGNIFICANT CHANGE UP (ref 3.8–10.5)
WBC # FLD AUTO: 8.33 K/UL — SIGNIFICANT CHANGE UP (ref 3.8–10.5)

## 2019-03-16 PROCEDURE — 71046 X-RAY EXAM CHEST 2 VIEWS: CPT | Mod: 26

## 2019-03-16 PROCEDURE — 99284 EMERGENCY DEPT VISIT MOD MDM: CPT | Mod: 25

## 2019-03-16 RX ORDER — KETOROLAC TROMETHAMINE 30 MG/ML
15 SYRINGE (ML) INJECTION ONCE
Qty: 0 | Refills: 0 | Status: DISCONTINUED | OUTPATIENT
Start: 2019-03-16 | End: 2019-03-16

## 2019-03-16 RX ADMIN — Medication 15 MILLIGRAM(S): at 23:57

## 2019-03-16 NOTE — ED ADULT TRIAGE NOTE - CHIEF COMPLAINT QUOTE
Pt st" I have workers comp injury 2017, I have l5 &s1 injury had surgery with rods , this past week pain is worse the pain meds not working, I have chest pain from taking extra meds, Lyrica , Naprosyn, baclophen.""I also feel short of breath....and have palpitations.""I have numbness and tingling in arms and legs since the injury but seems worse this week." Denies incont of urine or bowels

## 2019-03-16 NOTE — ED PROVIDER NOTE - ATTENDING CONTRIBUTION TO CARE
Locurto  pt with new onset CP  SOB  intermittent  resting EKG  nl  ? if sxs similar to pe  send trop  dimer  CXR

## 2019-03-16 NOTE — ED ADULT NURSE NOTE - OBJECTIVE STATEMENT
pt on bed aox3 c/o Low back pain, chronic, s/p Work related accident had a Spinal Fusion on L5-SI last year, been bad on multiple pain/muscle relaxant medication, but for few days have been taking extra for the pain. now also reports CP non radiating SOB numbness. tingling sensation on lower extremity. MD atbedside eval the pt. PMHx of HTN HLD DM on cm sinus rhythm will monitor

## 2019-03-16 NOTE — ED PROVIDER NOTE - OBJECTIVE STATEMENT
pt here c/o new onset chest tightness associated with SOB  occurs at rest  also with exertion  Pt relates sxs to increasing seroquel (has been on it for years   for chronic low back pain)  Pt also c/o ongoing b/l radicular pain  no bowel or bladder complaint   Pt with hx significant for PE  last year also P.Vera

## 2019-03-16 NOTE — ED PROVIDER NOTE - PHYSICAL EXAMINATION
Locurto as below +  strenth sensory intact  staright leg raise positive  b/l  decreased rt wrist pulse but brachial pulses equal  h/o prior surgery  rt wrist  (ganglion)

## 2019-03-17 VITALS
SYSTOLIC BLOOD PRESSURE: 138 MMHG | OXYGEN SATURATION: 98 % | TEMPERATURE: 98 F | DIASTOLIC BLOOD PRESSURE: 92 MMHG | RESPIRATION RATE: 18 BRPM | HEART RATE: 68 BPM

## 2019-03-17 LAB — TROPONIN T, HIGH SENSITIVITY: < 6 NG/L — SIGNIFICANT CHANGE UP (ref ?–14)

## 2019-03-17 PROCEDURE — 99235 HOSP IP/OBS SAME DATE MOD 70: CPT

## 2019-03-17 RX ORDER — KETOROLAC TROMETHAMINE 30 MG/ML
15 SYRINGE (ML) INJECTION ONCE
Qty: 0 | Refills: 0 | Status: DISCONTINUED | OUTPATIENT
Start: 2019-03-17 | End: 2019-03-17

## 2019-03-17 RX ADMIN — Medication 15 MILLIGRAM(S): at 07:14

## 2019-03-17 RX ADMIN — Medication 15 MILLIGRAM(S): at 06:44

## 2019-03-17 RX ADMIN — Medication 15 MILLIGRAM(S): at 00:20

## 2019-03-17 NOTE — ED CDU PROVIDER DISPOSITION NOTE - NSFOLLOWUPINSTRUCTIONS_ED_ALL_ED_FT
Follow up with your PMD Dr ERIKA Purcell within 1 week. Call for appointment- [phone]. Bring copies of your ER lab reports with you to MD appointment. Stay hydrated and get plenty of rest. Return to ER if symptoms return or worsen or if other concerns arise.

## 2019-03-17 NOTE — ED CDU PROVIDER INITIAL DAY NOTE - PROGRESS NOTE DETAILS
Patient states he is feeling better. No complaints of chest pain . Still with some back pain. Exam: Heart- RRR s1s2 nl Lungs - clear bilaterally abd - soft NT ND extrem- no edema or cyanosis.  back - minimal tenderness on palpation. troponins negative  Stress test initially planned but patient had a stress test 3 months ago. Will continue to monitor on telemetry for now. If patient remains stable will plan for discharge.

## 2019-03-17 NOTE — ED CDU PROVIDER DISPOSITION NOTE - CLINICAL COURSE
48yM w/pmhx DM, HTN, HLD, polycythemia vera, hx PE/DVT no longer on AC p/w chest pain x 3 days and low back pain.  Sent to CDU after ddimer negative and trops negative.  Has no complaints today.  States had stress test 3 months ago which was wnl.  (acknowledging initial note states he did not complete).  Pt has no complaints and would rather see his PMD.  CP free. EKG non ischemic and 2 sets negative

## 2019-03-17 NOTE — ED CDU PROVIDER INITIAL DAY NOTE - NS ED MD PROGRESS NOTE PROVIDER NAME FT
Subjective   Patient is a 18 m.o. female presenting with upper extremity pain.   History provided by:  Mother   used: No    Upper Extremity Issue   Location:  Arm  Arm location:  L arm  Injury: yes    Time since incident:  1 hour  Mechanism of injury comment:  Pt was walking with mother and missed a step and pt was pulled up by her left arm; pt now refusing to use left arm  Pain details:     Quality:  Unable to specify    Radiates to:  Does not radiate    Severity:  Unable to specify    Onset quality:  Sudden    Timing:  Constant    Progression:  Unchanged  Prior injury to area:  No  Relieved by:  Nothing  Worsened by:  Movement and stretching area  Ineffective treatments:  None tried  Associated symptoms: decreased range of motion    Associated symptoms: no fever, no neck pain, no stiffness and no swelling    Behavior:     Behavior:  Fussy    Intake amount:  Eating and drinking normally    Urine output:  Normal    Last void:  Less than 6 hours ago  Risk factors: no concern for non-accidental trauma and no frequent fractures        Review of Systems   Constitutional: Negative for activity change, appetite change and fever.   HENT: Negative for congestion, ear pain and sore throat.    Eyes: Negative for pain and redness.   Respiratory: Negative for cough and wheezing.    Gastrointestinal: Negative for abdominal pain, nausea and vomiting.   Genitourinary: Negative for difficulty urinating and dysuria.   Musculoskeletal: Negative for myalgias, neck pain and stiffness.   Skin: Negative for rash and wound.   Neurological: Negative for facial asymmetry and headaches.   Psychiatric/Behavioral: Negative for agitation and behavioral problems.   All other systems reviewed and are negative.      History reviewed. No pertinent past medical history.    No Known Allergies    History reviewed. No pertinent surgical history.    History reviewed. No pertinent family history.    Social History     Social History    • Marital status: Single     Spouse name: N/A   • Number of children: N/A   • Years of education: N/A     Social History Main Topics   • Smoking status: Never Smoker   • Smokeless tobacco: None   • Alcohol use None   • Drug use: None   • Sexual activity: Not Asked     Other Topics Concern   • None     Social History Narrative           Objective   Physical Exam   Constitutional: She appears well-developed and well-nourished. She is active.   HENT:   Head: Atraumatic.   Nose: Nose normal.   Mouth/Throat: Mucous membranes are moist. Oropharynx is clear.   Eyes: EOM are normal. Pupils are equal, round, and reactive to light.   Neck: Normal range of motion. Neck supple.   Cardiovascular: Normal rate and regular rhythm.    Pulmonary/Chest: Effort normal and breath sounds normal.   Abdominal: Soft. Bowel sounds are normal.   Musculoskeletal:        Left elbow: She exhibits decreased range of motion. Tenderness found. Radial head tenderness noted.   On initial exam, pt holding left arm close to side, refusing to move or reach for objects. After elbow flexed,  supination-flexion, pt then began moving arm, and reaching up for objects. Pt now has full ROM of arm.    Neurological: She is alert.   Skin: Skin is warm and moist. Capillary refill takes less than 3 seconds.   Nursing note and vitals reviewed.      Procedures         ED Course  ED Course                  MDM  Number of Diagnoses or Management Options  Nursemaid's elbow of left upper extremity, initial encounter:      Amount and/or Complexity of Data Reviewed  Clinical lab tests: ordered and reviewed  Tests in the radiology section of CPT®: ordered and reviewed  Tests in the medicine section of CPT®: reviewed and ordered    Patient Progress  Patient progress: stable      Final diagnoses:   Nursemaid's elbow of left upper extremity, initial encounter            ROCHELLE Carreno  10/23/17 1914     Bernardino NAVARRO

## 2019-03-17 NOTE — ED CDU PROVIDER INITIAL DAY NOTE - MEDICAL DECISION MAKING DETAILS
48yM w/pmhx DM, HTN, HLD, polycythemia vera, hx PE/DVT no longer on AC p/w chest pain x 3 days and low back pain. Back pain is acute on chronic, no red flags. Given risk factors will get stress test in AM and monitor on tele.

## 2019-03-17 NOTE — ED CDU PROVIDER DISPOSITION NOTE - PLAN OF CARE
Resolution of symptoms Follow up with your PMD Dr ERIKA Purcell within 1 week. Call for appointment- [phone]. Bring copies of your ER lab reports with you to MD appointment. Stay hydrated and get plenty of rest. Return to ER if symptoms return or worsen or if other concerns arise.

## 2019-03-17 NOTE — ED CDU PROVIDER INITIAL DAY NOTE - OBJECTIVE STATEMENT
48yM w/pmhx DM, HTN, HLD, polycythemia vera, hx PE/DVT no longer on AC p/w chest pain x 3 days and low back pain. Pt states he has chronic low back pain, hx spinal fusion L4-L5 with sciatic nerve pain for which he takes Lyrica and naproxen. Pt states his back pain has been worsening or the past few weeks so he recently started taking more Lyrica then prescribed. Pt states the past 3 days he experiences left sided chest pain for 20minutes to 1 hours after taking Lyrica. He describes the pain as sharp and then tight associated with shortness of breath and palpitations, non-radiating, non-exertional. Pt denies near syncope, dizziness, lightheadedness, abdominal pain, n/v/d, headache, leg pain or swelling, recent travel or immobilization, no numbness or tingling, bowel or bladder incontinence, fever/chills, hx IVDU or any other concerns. In main ED pt with normal EKG, trop and d-dimer negative. Sent to CDU for a pharmacologic stress test. Pt reports exercise stress test a few months ago but that he wasn't able to stay on the treadmill for a long time due to his back pain.  Sent to CDU for tele monitoring and stress test. Pt currently denies chest pain, reports toradol has improved his back pain.  Pts cardiologist is Dr.Michael Purcell, no affiliated with [facility]

## 2019-03-17 NOTE — ED CDU PROVIDER INITIAL DAY NOTE - ATTENDING CONTRIBUTION TO CARE
Jr  pt placed in CDU for stress test  Pt reports intermittent SOB  no always exertional  no fever or cough  h/o PE in the past  chronic  LBP    exam pt with clear lungs  unremarkable cardiac exam   no calf tenderness  abd  variable upper abd tenderness  no true Steven's  decreased rt  radial (had surgery at site and brachial pulses symmetrical)  resting EKG  nonischemic  dimer negative

## 2019-03-20 ENCOUNTER — APPOINTMENT (OUTPATIENT)
Dept: ENDOCRINOLOGY | Facility: CLINIC | Age: 49
End: 2019-03-20
Payer: COMMERCIAL

## 2019-03-20 VITALS
HEART RATE: 87 BPM | SYSTOLIC BLOOD PRESSURE: 132 MMHG | DIASTOLIC BLOOD PRESSURE: 84 MMHG | HEIGHT: 69 IN | WEIGHT: 230 LBS | BODY MASS INDEX: 34.07 KG/M2 | OXYGEN SATURATION: 96 %

## 2019-03-20 PROCEDURE — 99214 OFFICE O/P EST MOD 30 MIN: CPT

## 2019-03-20 RX ORDER — GLIPIZIDE 5 MG/1
5 TABLET, FILM COATED, EXTENDED RELEASE ORAL DAILY
Qty: 90 | Refills: 0 | Status: DISCONTINUED | COMMUNITY
Start: 2018-07-27 | End: 2019-03-20

## 2019-03-20 RX ORDER — CELECOXIB 200 MG/1
200 CAPSULE ORAL
Qty: 60 | Refills: 0 | Status: DISCONTINUED | COMMUNITY
Start: 2017-12-21 | End: 2019-03-20

## 2019-03-20 RX ORDER — ATORVASTATIN CALCIUM 20 MG/1
20 TABLET, FILM COATED ORAL
Qty: 90 | Refills: 3 | Status: ACTIVE | COMMUNITY
Start: 2019-03-20

## 2019-03-20 RX ORDER — RIVAROXABAN 20 MG/1
20 TABLET, FILM COATED ORAL
Qty: 30 | Refills: 2 | Status: DISCONTINUED | COMMUNITY
Start: 2018-06-11 | End: 2019-03-20

## 2019-03-20 RX ORDER — ELUXADOLINE 100 MG/1
100 TABLET, FILM COATED ORAL
Refills: 0 | Status: DISCONTINUED | COMMUNITY
End: 2019-03-20

## 2019-03-20 RX ORDER — DULAGLUTIDE 0.75 MG/.5ML
0.75 INJECTION, SOLUTION SUBCUTANEOUS
Qty: 12 | Refills: 0 | Status: DISCONTINUED | COMMUNITY
Start: 2017-06-21 | End: 2019-03-20

## 2019-03-20 NOTE — ASSESSMENT
[Carbohydrate Consistent Diet] : carbohydrate consistent diet [Long Term Vascular Complications] : long term vascular complications of diabetes [Importance of Diet and Exercise] : importance of diet and exercise to improve glycemic control, achieve weight loss and improve cardiovascular health [FreeTextEntry1] : 48 y.o. male with h/o Type 2 DM, HTN and hyperlipidemia.\par 1. Type 2 DM- Suboptimal control with Hba1c of 8.2%. Encouraged carbohydrate consistent diet and exercise. Will discontinue Trulicity given GI symptoms. Will increase glipizide ER to 10 mg daily. Encouraged increase in SMBG and forwarding blood glucose log. Normal urine microalb/cr ratio.\par 2. HTN- BP is at goal and will continue medications\par 3. Hyperlipidemia- Will continue statin\par \par Follow up recent labs from PCP\par Follow up in 3 to 4 months\par Follow up with optho and podiatry [Diabetes Foot Care] : diabetes foot care

## 2019-03-20 NOTE — HISTORY OF PRESENT ILLNESS
[FreeTextEntry1] : 48 y.o. male with h/o Type 2 DM diagnosed in 2/2017, HTN and hyperlipidemia here for follow up visit. No acute events since last visit. Reports GERD and diarrhea which has increased in the first 2 days after Trulicity administration. Also has Barretts. Stopped Trulicity a few weeks ago. Intolerant of Metformin. Taking glipizide ER 5 mg daily. Checks FS 1 to 2 times per day. Fasting blood glucose levels are 160s.  Diet could be better.  UTD with optho and no retinopathy. Saw podiatry. Feeling good. No polyuria and no polydipsia. Dealing with back and neck pain. Went to pain management for lumbar nerve blocks and looking into radiofrequency. Last HBa1c was 8.2%.

## 2019-03-20 NOTE — PHYSICAL EXAM
[Alert] : alert [No Acute Distress] : no acute distress [Normal Sclera/Conjunctiva] : normal sclera/conjunctiva [EOMI] : extra ocular movement intact [Supple] : the neck was supple [No LAD] : no lymphadenopathy [Thyroid Not Enlarged] : the thyroid was not enlarged [No Accessory Muscle Use] : no accessory muscle use [Clear to Auscultation] : lungs were clear to auscultation bilaterally [Normal S1, S2] : normal S1 and S2 [Regular Rhythm] : with a regular rhythm [No Edema] : there was no peripheral edema [Normal Bowel Sounds] : normal bowel sounds [Not Tender] : non-tender [Soft] : abdomen soft [Not Distended] : not distended [No Clubbing, Cyanosis] : no clubbing  or cyanosis of the fingernails [No Rash] : no rash [Acanthosis Nigricans] : acanthosis nigricans [Normal Reflexes] : deep tendon reflexes were 2+ and symmetric [Normal Affect] : the affect was normal [Normal Mood] : the mood was normal [Right Foot Was Examined] : right foot ~C was examined [Left Foot Was Examined] : left foot ~C was examined [2+] : 2+ in the dorsalis pedis [Diminished Throughout Both Feet] : normal tactile sensation with monofilament testing throughout both feet [FreeTextEntry1] : callus [FreeTextEntry5] : callus

## 2019-03-20 NOTE — REVIEW OF SYSTEMS
[Heartburn] : heartburn [Pain/Numbness of Digits] : pain/numbness of digits [Negative] : Respiratory [Polyuria] : no polyuria [Back Pain] : back pain [Polydipsia] : no polydipsia [Swelling] : no swelling

## 2019-04-01 ENCOUNTER — OUTPATIENT (OUTPATIENT)
Dept: OUTPATIENT SERVICES | Facility: HOSPITAL | Age: 49
LOS: 1 days | End: 2019-04-01

## 2019-04-01 VITALS
HEART RATE: 64 BPM | WEIGHT: 238.1 LBS | DIASTOLIC BLOOD PRESSURE: 86 MMHG | RESPIRATION RATE: 18 BRPM | SYSTOLIC BLOOD PRESSURE: 140 MMHG | HEIGHT: 69.5 IN | TEMPERATURE: 98 F

## 2019-04-01 DIAGNOSIS — R06.09 OTHER FORMS OF DYSPNEA: ICD-10-CM

## 2019-04-01 DIAGNOSIS — N50.89 OTHER SPECIFIED DISORDERS OF THE MALE GENITAL ORGANS: ICD-10-CM

## 2019-04-01 DIAGNOSIS — Z98.1 ARTHRODESIS STATUS: Chronic | ICD-10-CM

## 2019-04-01 DIAGNOSIS — Z98.890 OTHER SPECIFIED POSTPROCEDURAL STATES: Chronic | ICD-10-CM

## 2019-04-01 DIAGNOSIS — E11.9 TYPE 2 DIABETES MELLITUS WITHOUT COMPLICATIONS: ICD-10-CM

## 2019-04-01 DIAGNOSIS — I10 ESSENTIAL (PRIMARY) HYPERTENSION: ICD-10-CM

## 2019-04-01 DIAGNOSIS — G47.33 OBSTRUCTIVE SLEEP APNEA (ADULT) (PEDIATRIC): ICD-10-CM

## 2019-04-01 LAB
ANION GAP SERPL CALC-SCNC: 11 MMO/L — SIGNIFICANT CHANGE UP (ref 7–14)
BUN SERPL-MCNC: 13 MG/DL — SIGNIFICANT CHANGE UP (ref 7–23)
CALCIUM SERPL-MCNC: 9.1 MG/DL — SIGNIFICANT CHANGE UP (ref 8.4–10.5)
CHLORIDE SERPL-SCNC: 102 MMOL/L — SIGNIFICANT CHANGE UP (ref 98–107)
CO2 SERPL-SCNC: 26 MMOL/L — SIGNIFICANT CHANGE UP (ref 22–31)
CREAT SERPL-MCNC: 0.79 MG/DL — SIGNIFICANT CHANGE UP (ref 0.5–1.3)
GLUCOSE SERPL-MCNC: 204 MG/DL — HIGH (ref 70–99)
HBA1C BLD-MCNC: 8.3 % — HIGH (ref 4–5.6)
HCT VFR BLD CALC: 45.8 % — SIGNIFICANT CHANGE UP (ref 39–50)
HGB BLD-MCNC: 14.9 G/DL — SIGNIFICANT CHANGE UP (ref 13–17)
MCHC RBC-ENTMCNC: 27.6 PG — SIGNIFICANT CHANGE UP (ref 27–34)
MCHC RBC-ENTMCNC: 32.5 % — SIGNIFICANT CHANGE UP (ref 32–36)
MCV RBC AUTO: 85 FL — SIGNIFICANT CHANGE UP (ref 80–100)
NRBC # FLD: 0 K/UL — SIGNIFICANT CHANGE UP (ref 0–0)
PLATELET # BLD AUTO: 221 K/UL — SIGNIFICANT CHANGE UP (ref 150–400)
PMV BLD: 9.8 FL — SIGNIFICANT CHANGE UP (ref 7–13)
POTASSIUM SERPL-MCNC: 3.8 MMOL/L — SIGNIFICANT CHANGE UP (ref 3.5–5.3)
POTASSIUM SERPL-SCNC: 3.8 MMOL/L — SIGNIFICANT CHANGE UP (ref 3.5–5.3)
RBC # BLD: 5.39 M/UL — SIGNIFICANT CHANGE UP (ref 4.2–5.8)
RBC # FLD: 14.6 % — HIGH (ref 10.3–14.5)
SODIUM SERPL-SCNC: 139 MMOL/L — SIGNIFICANT CHANGE UP (ref 135–145)
WBC # BLD: 5.82 K/UL — SIGNIFICANT CHANGE UP (ref 3.8–10.5)
WBC # FLD AUTO: 5.82 K/UL — SIGNIFICANT CHANGE UP (ref 3.8–10.5)

## 2019-04-01 RX ORDER — DULAGLUTIDE 4.5 MG/.5ML
1 INJECTION, SOLUTION SUBCUTANEOUS
Qty: 0 | Refills: 0 | COMMUNITY

## 2019-04-01 RX ORDER — TAMSULOSIN HYDROCHLORIDE 0.4 MG/1
1 CAPSULE ORAL
Qty: 0 | Refills: 0 | COMMUNITY

## 2019-04-01 RX ORDER — GLIMEPIRIDE 1 MG
1 TABLET ORAL
Qty: 0 | Refills: 0 | COMMUNITY

## 2019-04-01 RX ORDER — BACLOFEN 100 %
1 POWDER (GRAM) MISCELLANEOUS
Qty: 0 | Refills: 0 | COMMUNITY

## 2019-04-01 RX ORDER — NEBIVOLOL HYDROCHLORIDE 5 MG/1
1 TABLET ORAL
Qty: 0 | Refills: 0 | COMMUNITY

## 2019-04-01 RX ORDER — TADALAFIL 10 MG/1
1 TABLET, FILM COATED ORAL
Qty: 0 | Refills: 0 | COMMUNITY

## 2019-04-01 RX ORDER — DEXLANSOPRAZOLE 30 MG/1
1 CAPSULE, DELAYED RELEASE ORAL
Qty: 0 | Refills: 0 | COMMUNITY

## 2019-04-01 NOTE — H&P PST ADULT - NSICDXPROBLEM_GEN_ALL_CORE_FT
PROBLEM DIAGNOSES  Problem: Other specified disorders of male genital organs  Assessment and Plan: scheduled for left microsurgical spermatic cord denervation on 04/15/2019.   Pre-Op instructions provided to patient.      Problem: BASS (dyspnea on exertion)  Assessment and Plan: Pending Cardiac eval -copy requested    Problem: Hypertension  Assessment and Plan: Pt. instructed to continue medications as prescribed.     Problem: DM (diabetes mellitus)  Assessment and Plan: Pt. instructed to hold Glipizide  morning of procedure. Accucheck DOS. OR booking notified of DM2     Problem: Obstructive sleep apnea  Assessment and Plan:

## 2019-04-01 NOTE — H&P PST ADULT - ASSESSMENT
47 yo male presents to PST unit with pre-op diagnosis of other specified disorders of male genital organs scheduled for left microsurgical spermatic cord denervation on 04/15/2019.

## 2019-04-01 NOTE — H&P PST ADULT - NEGATIVE ENMT SYMPTOMS
no dysphagia/no tinnitus/no vertigo/no nasal congestion/no ear pain/no sinus symptoms/no hearing difficulty

## 2019-04-01 NOTE — H&P PST ADULT - NSICDXPASTMEDICALHX_GEN_ALL_CORE_FT
PAST MEDICAL HISTORY:  DM (diabetes mellitus)     Hyperlipidemia     Hypertension     Migraine     Other specified disorders of male genital organs     Polycythemia vera     Vertigo PAST MEDICAL HISTORY:  DM (diabetes mellitus)     History of pulmonary embolism May 2018, treated w/6 months of Xarelto    Hyperlipidemia     Hypertension     Migraine     Obstructive sleep apnea no CPAP    Other specified disorders of male genital organs     Polycythemia vera     Vertigo

## 2019-04-01 NOTE — H&P PST ADULT - NSICDXPASTSURGICALHX_GEN_ALL_CORE_FT
PAST SURGICAL HISTORY:  H/O bilateral inguinal hernia repair     H/O knee surgery left meniscus    History of lumbar spinal fusion

## 2019-04-01 NOTE — H&P PST ADULT - RS GEN PE MLT RESP DETAILS PC
good air movement/respirations non-labored/airway patent/no wheezes/breath sounds equal/clear to auscultation bilaterally

## 2019-04-01 NOTE — H&P PST ADULT - HISTORY OF PRESENT ILLNESS
49 yo male presents to PST unit with pre-op diagnosis of other specified disorders of male genital organs scheduled for left microsurgical spermatic cord denervation on 04/15/2019. He reports a history of spinal surgery with lumbar radiculopathy which he feels has contributed to his groin pain.

## 2019-04-02 PROBLEM — G47.33 OBSTRUCTIVE SLEEP APNEA (ADULT) (PEDIATRIC): Chronic | Status: ACTIVE | Noted: 2019-04-01

## 2019-04-03 PROBLEM — N50.89 OTHER SPECIFIED DISORDERS OF THE MALE GENITAL ORGANS: Chronic | Status: ACTIVE | Noted: 2019-04-01

## 2019-04-03 PROBLEM — E78.5 HYPERLIPIDEMIA, UNSPECIFIED: Chronic | Status: ACTIVE | Noted: 2019-04-01

## 2019-04-08 ENCOUNTER — FORM ENCOUNTER (OUTPATIENT)
Age: 49
End: 2019-04-08

## 2019-04-09 ENCOUNTER — OUTPATIENT (OUTPATIENT)
Dept: OUTPATIENT SERVICES | Facility: HOSPITAL | Age: 49
LOS: 1 days | End: 2019-04-09
Payer: COMMERCIAL

## 2019-04-09 ENCOUNTER — APPOINTMENT (OUTPATIENT)
Dept: CT IMAGING | Facility: IMAGING CENTER | Age: 49
End: 2019-04-09
Payer: COMMERCIAL

## 2019-04-09 DIAGNOSIS — Z98.1 ARTHRODESIS STATUS: Chronic | ICD-10-CM

## 2019-04-09 DIAGNOSIS — Z98.890 OTHER SPECIFIED POSTPROCEDURAL STATES: Chronic | ICD-10-CM

## 2019-04-09 DIAGNOSIS — D75.1 SECONDARY POLYCYTHEMIA: ICD-10-CM

## 2019-04-09 PROCEDURE — 71250 CT THORAX DX C-: CPT | Mod: 26

## 2019-04-09 PROCEDURE — 71250 CT THORAX DX C-: CPT

## 2019-04-10 NOTE — PHYSICAL EXAM
[General Appearance - Well Developed] : well developed [Normal Appearance] : normal appearance [General Appearance - Well Nourished] : well nourished [General Appearance - In No Acute Distress] : no acute distress [Well Groomed] : well groomed [Abdomen Soft] : soft [Bowel Sounds] : normal bowel sounds [Abdomen Tenderness] : non-tender [Urethral Meatus] : meatus normal [Costovertebral Angle Tenderness] : no ~M costovertebral angle tenderness [Urinary Bladder Findings] : the bladder was normal on palpation [Scrotum] : the scrotum was normal [Testes Mass (___cm)] : there were no testicular masses [No Prostate Nodules] : no prostate nodules [] : no respiratory distress [Edema] : no peripheral edema [Exaggerated Use Of Accessory Muscles For Inspiration] : no accessory muscle use [Oriented To Time, Place, And Person] : oriented to person, place, and time [Respiration, Rhythm And Depth] : normal respiratory rhythm and effort [Affect] : the affect was normal [Mood] : the mood was normal [Not Anxious] : not anxious [Normal Station and Gait] : the gait and station were normal for the patient's age [No Focal Deficits] : no focal deficits [No Palpable Adenopathy] : no palpable adenopathy

## 2019-04-10 NOTE — HISTORY OF PRESENT ILLNESS
[FreeTextEntry1] : The patient presents for a left microsurgical spermatic cord denervation and possibly a left epididymectomy.\par \par PMH: Patient initially presented with the chief complaint of left epididymal pain for evaluation. The patient stated that he had a surgery to correct his Peyronies disease performed by Dr. Grabiel Ponce in 2005. He stated that he had a bilateral varicocele ligation in 2008, a L5-[id number] fusion in 2008 and a bilateral inguinal hernia repair performed in 2009.  Since having the procedure for Peyronies disease he has had left epididymal pain. he is a diabetic and has had a pulmonary embolism treated with Xarelto which he is now off. A left spermatic cord block reduced his epididymal pain by greater than 75%.  He has no known drug allergies.  His past medical history demonstrates no significant urologic issues.  He is  without children and does not have the desire to have children.  In his present occupation as a  for the [employer] system he has no known toxin exposure.  He does smoke and drinks only socially.  He has no known drug allergies.  His review of systems is non-contributory. His family history is not significant.\par

## 2019-04-10 NOTE — ASSESSMENT
[FreeTextEntry1] : The patient presents for a left microsurgical spermatic cord denervation and possibly a left epididymectomy.\par \par We discussed left microsurgical spermatic cord denervation. We discussed the relative risks and benefits including persistent pain and damage to his testis. He would like to proceed. He is not interested in having children. \par

## 2019-04-14 ENCOUNTER — TRANSCRIPTION ENCOUNTER (OUTPATIENT)
Age: 49
End: 2019-04-14

## 2019-04-15 ENCOUNTER — APPOINTMENT (OUTPATIENT)
Dept: UROLOGY | Facility: AMBULATORY SURGERY CENTER | Age: 49
End: 2019-04-15

## 2019-04-15 ENCOUNTER — OUTPATIENT (OUTPATIENT)
Dept: OUTPATIENT SERVICES | Facility: HOSPITAL | Age: 49
LOS: 1 days | Discharge: HOME | End: 2019-04-15
Payer: COMMERCIAL

## 2019-04-15 VITALS
TEMPERATURE: 98 F | HEIGHT: 69.5 IN | DIASTOLIC BLOOD PRESSURE: 86 MMHG | OXYGEN SATURATION: 97 % | HEART RATE: 76 BPM | SYSTOLIC BLOOD PRESSURE: 136 MMHG | RESPIRATION RATE: 18 BRPM | WEIGHT: 238.1 LBS

## 2019-04-15 VITALS
SYSTOLIC BLOOD PRESSURE: 112 MMHG | HEART RATE: 67 BPM | RESPIRATION RATE: 16 BRPM | DIASTOLIC BLOOD PRESSURE: 64 MMHG | OXYGEN SATURATION: 100 % | TEMPERATURE: 99 F

## 2019-04-15 DIAGNOSIS — N50.89 OTHER SPECIFIED DISORDERS OF THE MALE GENITAL ORGANS: ICD-10-CM

## 2019-04-15 DIAGNOSIS — Z98.890 OTHER SPECIFIED POSTPROCEDURAL STATES: Chronic | ICD-10-CM

## 2019-04-15 DIAGNOSIS — Z98.1 ARTHRODESIS STATUS: Chronic | ICD-10-CM

## 2019-04-15 LAB — GLUCOSE BLDC GLUCOMTR-MCNC: 141 MG/DL — HIGH (ref 70–99)

## 2019-04-15 PROCEDURE — 55899 UNLISTED PX MALE GENITAL SYS: CPT

## 2019-04-15 NOTE — ASU DISCHARGE PLAN (ADULT/PEDIATRIC) - FOLLOW UP APPOINTMENTS
[facility]

## 2019-04-15 NOTE — ASU DISCHARGE PLAN (ADULT/PEDIATRIC) - NURSING INSTRUCTIONS
wear scrotal support for comfort . wear scrotal support for comfort .  Pt may start  tylenol at 510 pm  at home if needed.  May start narpoxen when needed at home.

## 2019-04-15 NOTE — ASU DISCHARGE PLAN (ADULT/PEDIATRIC) - ACTIVITY LEVEL
No excercise/No heavy lifting/No sports/gym/Nothing strenuous for 48 hours. No heavy lifting more than 10 lbs for 2 weeks. No exercise/gym for 3 weeks.

## 2019-04-17 ENCOUNTER — CLINICAL ADVICE (OUTPATIENT)
Age: 49
End: 2019-04-17

## 2019-04-22 ENCOUNTER — RX RENEWAL (OUTPATIENT)
Age: 49
End: 2019-04-22

## 2019-04-25 ENCOUNTER — APPOINTMENT (OUTPATIENT)
Dept: SURGERY | Facility: CLINIC | Age: 49
End: 2019-04-25
Payer: COMMERCIAL

## 2019-04-29 ENCOUNTER — APPOINTMENT (OUTPATIENT)
Dept: OPHTHALMOLOGY | Facility: CLINIC | Age: 49
End: 2019-04-29
Payer: COMMERCIAL

## 2019-04-29 PROCEDURE — 92014 COMPRE OPH EXAM EST PT 1/>: CPT

## 2019-05-02 ENCOUNTER — APPOINTMENT (OUTPATIENT)
Dept: UROLOGY | Facility: CLINIC | Age: 49
End: 2019-05-02
Payer: COMMERCIAL

## 2019-05-02 PROCEDURE — 99024 POSTOP FOLLOW-UP VISIT: CPT

## 2019-05-02 NOTE — ASSESSMENT
[FreeTextEntry1] : The patient presents for a left microsurgical spermatic cord denervation and possibly a left epididymectomy. His incision was healing nicely. The left testis was not swollen nor tender. There is no ecchymosis noted in either hemiscrotum. He will get back to normal activities and will return in 3 months for followup and scrotal ultrasound.\par

## 2019-05-02 NOTE — HISTORY OF PRESENT ILLNESS
[FreeTextEntry1] : The patient presents s/p a  left microsurgical spermatic cord denervation 2 weeks ago. He is doing well and has no complaints.\par \par PMH: Patient initially presented with the chief complaint of left epididymal pain for evaluation. The patient stated that he had a surgery to correct his Peyronies disease performed by Dr. Grabiel Ponce in 2005. He stated that he had a bilateral varicocele ligation in 2008, a L5-[id number] fusion in 2008 and a bilateral inguinal hernia repair performed in 2009.  Since having the procedure for Peyronies disease he has had left epididymal pain. he is a diabetic and has had a pulmonary embolism treated with Xarelto which he is now off. A left spermatic cord block reduced his epididymal pain by greater than 75%.  He has no known drug allergies.  His past medical history demonstrates no significant urologic issues.  He is  without children and does not have the desire to have children.  In his present occupation as a  for the [employer] system he has no known toxin exposure.  He does smoke and drinks only socially.  He has no known drug allergies.  His review of systems is non-contributory. His family history is not significant.\par

## 2019-05-06 ENCOUNTER — MESSAGE (OUTPATIENT)
Age: 49
End: 2019-05-06

## 2019-05-09 ENCOUNTER — APPOINTMENT (OUTPATIENT)
Dept: SURGERY | Facility: CLINIC | Age: 49
End: 2019-05-09
Payer: COMMERCIAL

## 2019-05-09 VITALS
DIASTOLIC BLOOD PRESSURE: 77 MMHG | OXYGEN SATURATION: 96 % | TEMPERATURE: 209.12 F | SYSTOLIC BLOOD PRESSURE: 122 MMHG | RESPIRATION RATE: 16 BRPM | WEIGHT: 230 LBS | HEIGHT: 69 IN | HEART RATE: 72 BPM | BODY MASS INDEX: 34.07 KG/M2

## 2019-05-09 PROCEDURE — 46221 LIGATION OF HEMORRHOID(S): CPT

## 2019-05-09 PROCEDURE — 99213 OFFICE O/P EST LOW 20 MIN: CPT | Mod: 25

## 2019-05-09 NOTE — PHYSICAL EXAM
[FreeTextEntry1] : Perianal inspection demonstrated thickened skin folds. Redundant tissue noted on digital exam. Anoscopy confirmed moderate internal hemorrhoid enlargement in the left lateral and right posterior positions.

## 2019-05-09 NOTE — HISTORY OF PRESENT ILLNESS
[FreeTextEntry1] : Valentin is a 49 y/o male here for evaluation of rectal discomfort. Last seen on 2/8/18, patient with pruritus ani. Advised to try Silvadene cream. Colonoscopy from 3/21/17 demonstrated grade 1 internal hemorrhoids. 3 mm polyp in the distal rectum. Pathology: Hyperplastic polyp. Normal mucosa in the rectum, sigmoid colon, descending colon, transverse colon, ascending colon and cecum. \par \par Today, patient reports he is still experiencing pruritus ani. Has intermittent rectal pain and bleeding. Denies prolapsing anal tissue. PMH of IBS- has inconsistent BM. Off Xarelto s/p PE

## 2019-05-09 NOTE — HISTORY OF PRESENT ILLNESS
[FreeTextEntry1] : Valentin is a 47 y/o male here for evaluation of rectal discomfort. Last seen on 2/8/18, patient with pruritus ani. Advised to try Silvadene cream. Colonoscopy from 3/21/17 demonstrated grade 1 internal hemorrhoids. 3 mm polyp in the distal rectum. Pathology: Hyperplastic polyp. Normal mucosa in the rectum, sigmoid colon, descending colon, transverse colon, ascending colon and cecum. \par \par Today, patient reports he is still experiencing pruritus ani. Has intermittent rectal pain and bleeding. Denies prolapsing anal tissue. PMH of IBS- has inconsistent BM. Off Xarelto s/p PE

## 2019-05-09 NOTE — ASSESSMENT
[FreeTextEntry1] : Patient with moderate hemorrhoid enlargement and pruritus. He has failed topical therapy. Rubber band ligation offered. Indications, risks, benefits, alternatives reviewed including but not limited to bleeding, infection, and failure. Patient agreed. Left lateral hemorrhoid banded. Post and instruction sheet reviewed. Followup in one month for banding of the right posterior hemorrhoid if symptoms persist.

## 2019-05-30 ENCOUNTER — OUTPATIENT (OUTPATIENT)
Dept: OUTPATIENT SERVICES | Facility: HOSPITAL | Age: 49
LOS: 1 days | Discharge: HOME | End: 2019-05-30

## 2019-05-30 DIAGNOSIS — D64.9 ANEMIA, UNSPECIFIED: ICD-10-CM

## 2019-05-30 DIAGNOSIS — Z98.1 ARTHRODESIS STATUS: Chronic | ICD-10-CM

## 2019-05-30 DIAGNOSIS — Z98.890 OTHER SPECIFIED POSTPROCEDURAL STATES: Chronic | ICD-10-CM

## 2019-06-03 ENCOUNTER — APPOINTMENT (OUTPATIENT)
Dept: HEMATOLOGY ONCOLOGY | Facility: CLINIC | Age: 49
End: 2019-06-03

## 2019-06-03 ENCOUNTER — RESULT REVIEW (OUTPATIENT)
Age: 49
End: 2019-06-03

## 2019-06-03 LAB
BASOPHILS # BLD AUTO: 0.1 K/UL — SIGNIFICANT CHANGE UP (ref 0–0.2)
BASOPHILS NFR BLD AUTO: 0.8 % — SIGNIFICANT CHANGE UP (ref 0–2)
EOSINOPHIL # BLD AUTO: 0.2 K/UL — SIGNIFICANT CHANGE UP (ref 0–0.5)
EOSINOPHIL NFR BLD AUTO: 3 % — SIGNIFICANT CHANGE UP (ref 0–6)
HCT VFR BLD CALC: 44.9 % — SIGNIFICANT CHANGE UP (ref 39–50)
HGB BLD-MCNC: 16.5 G/DL — SIGNIFICANT CHANGE UP (ref 13–17)
LYMPHOCYTES # BLD AUTO: 2.2 K/UL — SIGNIFICANT CHANGE UP (ref 1–3.3)
LYMPHOCYTES # BLD AUTO: 32.2 % — SIGNIFICANT CHANGE UP (ref 13–44)
MCHC RBC-ENTMCNC: 31.3 PG — SIGNIFICANT CHANGE UP (ref 27–34)
MCHC RBC-ENTMCNC: 36.7 G/DL — HIGH (ref 32–36)
MCV RBC AUTO: 85.4 FL — SIGNIFICANT CHANGE UP (ref 80–100)
MONOCYTES # BLD AUTO: 0.4 K/UL — SIGNIFICANT CHANGE UP (ref 0–0.9)
MONOCYTES NFR BLD AUTO: 6.3 % — SIGNIFICANT CHANGE UP (ref 2–14)
NEUTROPHILS # BLD AUTO: 4 K/UL — SIGNIFICANT CHANGE UP (ref 1.8–7.4)
NEUTROPHILS NFR BLD AUTO: 57.6 % — SIGNIFICANT CHANGE UP (ref 43–77)
PLATELET # BLD AUTO: 211 K/UL — SIGNIFICANT CHANGE UP (ref 150–400)
RBC # BLD: 5.26 M/UL — SIGNIFICANT CHANGE UP (ref 4.2–5.8)
RBC # FLD: 14.2 % — SIGNIFICANT CHANGE UP (ref 10.3–14.5)
WBC # BLD: 6.9 K/UL — SIGNIFICANT CHANGE UP (ref 3.8–10.5)
WBC # FLD AUTO: 6.9 K/UL — SIGNIFICANT CHANGE UP (ref 3.8–10.5)

## 2019-06-05 ENCOUNTER — APPOINTMENT (OUTPATIENT)
Dept: SURGERY | Facility: CLINIC | Age: 49
End: 2019-06-05
Payer: COMMERCIAL

## 2019-06-05 VITALS
DIASTOLIC BLOOD PRESSURE: 89 MMHG | HEART RATE: 72 BPM | RESPIRATION RATE: 16 BRPM | OXYGEN SATURATION: 98 % | SYSTOLIC BLOOD PRESSURE: 133 MMHG | TEMPERATURE: 208.76 F

## 2019-06-05 PROCEDURE — 46221 LIGATION OF HEMORRHOID(S): CPT

## 2019-06-05 NOTE — ASSESSMENT
[FreeTextEntry1] : Rubber band ligation right posterior hemorrhoid. Post band sheet reviewed. Patient will followup for further bands if symptoms persist.

## 2019-06-05 NOTE — HISTORY OF PRESENT ILLNESS
[FreeTextEntry1] : Valentin is a 49 y/o male here for a one month follow up visit.\par Colonoscopy from 3/21/17 demonstrated grade 1 internal hemorrhoids. 3 mm polyp in the distal rectum. Pathology: Hyperplastic polyp. Normal mucosa in the rectum, sigmoid colon, descending colon, transverse colon, ascending colon and cecum. \par Patient last seen with moderate hemorrhoid enlargement and pruritus. Left Lateral hemorrhoid banded. \par Patient here today for banding of right posterior hemorrhoid. \par \par \par

## 2019-06-05 NOTE — PHYSICAL EXAM
[FreeTextEntry1] : Perianal inspection unremarkable. Digital exam and anoscopy demonstrated reduction of hemorrhoidal tissue in the left lateral position. Right posterior hemorrhoid enlarged.

## 2019-06-25 ENCOUNTER — MEDICATION RENEWAL (OUTPATIENT)
Age: 49
End: 2019-06-25

## 2019-06-29 NOTE — H&P ADULT - HISTORY OF PRESENT ILLNESS
48 y/o M hx lumbar radiculopathy, T2DM, [place] 2 negative polycythemia vera, migraine, hypertension and recent diagnosis of vertigo presents with left lower extremity cramping and pain.     Patient reports that one day prior to presentation he had gradual onset of left sided calf pain, redness, and edema. He began to have difficulty bearing weight prompting him to present to ER. Work up including lower extremity ultrasound revealing below the knee left sided DVT. CTA showed bilateral pulmonary embolism without right heart strain. He was admitted to the CDU for further workup and management. Transthoracic echocardiogram showed normal left ventricular systolic function and grossly normal right ventricular systolic function. He was evaluated by cardiology and started on rivaroxaban for treatment of acute thrombus.     Overnight, the patient developed severe headache described as one of the worst headaches of his life. The patient reported that it was reminiscent of previous migraines but worse in intensity. Urgent CTH at that time showed small right sided maxillary sinus fluid level. He was treated with Fioricet magnesium, Toradol valproate acid and percocet with improvement in his pain. Presently, the headache is resolved. He denies any photophobia or phonophobia presently. Denies weakness in upper or lower extremities. Denies difficulty with speech or swallow. The patient was evaluated by neurology who did not recommend additional imaging.     In reference to polycythemia vera, the patient follows at [facility] with Dr. Cosme. He reports that he is treated with intermittent phlebotomy, usually about twice yearly, though he is unsure of last time he was phlebotomized.     Of note, the patient was admitted to CDU on 5/10 with headache and dizziness. At that time, he was also evaluated by neurology. On examination, he had had horizontal nystagmus and given questionable history of TBI and cranioplasty, he was recommended to rule out secondary cause with MRI. The patient was unable to tolerate MRI secondary to claustrophobia and he instead completed a CTA head and neck, which was unrevealing.
Principal Discharge DX:	Rectal bleeding  Secondary Diagnosis:	Head injury  Secondary Diagnosis:	Rib contusion, right, initial encounter

## 2019-07-10 ENCOUNTER — RESULT REVIEW (OUTPATIENT)
Age: 49
End: 2019-07-10

## 2019-07-10 ENCOUNTER — OUTPATIENT (OUTPATIENT)
Dept: OUTPATIENT SERVICES | Facility: HOSPITAL | Age: 49
LOS: 1 days | Discharge: HOME | End: 2019-07-10

## 2019-07-10 ENCOUNTER — APPOINTMENT (OUTPATIENT)
Dept: HEMATOLOGY ONCOLOGY | Facility: CLINIC | Age: 49
End: 2019-07-10

## 2019-07-10 DIAGNOSIS — D64.9 ANEMIA, UNSPECIFIED: ICD-10-CM

## 2019-07-10 DIAGNOSIS — Z98.890 OTHER SPECIFIED POSTPROCEDURAL STATES: Chronic | ICD-10-CM

## 2019-07-10 DIAGNOSIS — Z98.1 ARTHRODESIS STATUS: Chronic | ICD-10-CM

## 2019-07-10 LAB
BASOPHILS # BLD AUTO: 0.1 K/UL — SIGNIFICANT CHANGE UP (ref 0–0.2)
BASOPHILS NFR BLD AUTO: 0.9 % — SIGNIFICANT CHANGE UP (ref 0–2)
EOSINOPHIL # BLD AUTO: 0.3 K/UL — SIGNIFICANT CHANGE UP (ref 0–0.5)
EOSINOPHIL NFR BLD AUTO: 3.7 % — SIGNIFICANT CHANGE UP (ref 0–6)
HCT VFR BLD CALC: 48.7 % — SIGNIFICANT CHANGE UP (ref 39–50)
HGB BLD-MCNC: 17 G/DL — SIGNIFICANT CHANGE UP (ref 13–17)
LYMPHOCYTES # BLD AUTO: 2.2 K/UL — SIGNIFICANT CHANGE UP (ref 1–3.3)
LYMPHOCYTES # BLD AUTO: 30.1 % — SIGNIFICANT CHANGE UP (ref 13–44)
MCHC RBC-ENTMCNC: 30.7 PG — SIGNIFICANT CHANGE UP (ref 27–34)
MCHC RBC-ENTMCNC: 35 G/DL — SIGNIFICANT CHANGE UP (ref 32–36)
MCV RBC AUTO: 87.8 FL — SIGNIFICANT CHANGE UP (ref 80–100)
MONOCYTES # BLD AUTO: 0.5 K/UL — SIGNIFICANT CHANGE UP (ref 0–0.9)
MONOCYTES NFR BLD AUTO: 7.3 % — SIGNIFICANT CHANGE UP (ref 2–14)
NEUTROPHILS # BLD AUTO: 4.3 K/UL — SIGNIFICANT CHANGE UP (ref 1.8–7.4)
NEUTROPHILS NFR BLD AUTO: 58.1 % — SIGNIFICANT CHANGE UP (ref 43–77)
PLATELET # BLD AUTO: 209 K/UL — SIGNIFICANT CHANGE UP (ref 150–400)
RBC # BLD: 5.55 M/UL — SIGNIFICANT CHANGE UP (ref 4.2–5.8)
RBC # FLD: 13.4 % — SIGNIFICANT CHANGE UP (ref 10.3–14.5)
WBC # BLD: 7.4 K/UL — SIGNIFICANT CHANGE UP (ref 3.8–10.5)
WBC # FLD AUTO: 7.4 K/UL — SIGNIFICANT CHANGE UP (ref 3.8–10.5)

## 2019-07-22 ENCOUNTER — APPOINTMENT (OUTPATIENT)
Dept: ENDOCRINOLOGY | Facility: CLINIC | Age: 49
End: 2019-07-22
Payer: COMMERCIAL

## 2019-07-22 VITALS
HEIGHT: 69 IN | DIASTOLIC BLOOD PRESSURE: 70 MMHG | BODY MASS INDEX: 33.92 KG/M2 | SYSTOLIC BLOOD PRESSURE: 110 MMHG | HEART RATE: 76 BPM | OXYGEN SATURATION: 96 % | WEIGHT: 229 LBS

## 2019-07-22 LAB
GLUCOSE BLDC GLUCOMTR-MCNC: 219
HBA1C MFR BLD HPLC: 8.7

## 2019-07-22 PROCEDURE — 99214 OFFICE O/P EST MOD 30 MIN: CPT | Mod: 25

## 2019-07-22 PROCEDURE — 82962 GLUCOSE BLOOD TEST: CPT

## 2019-07-22 PROCEDURE — 83036 HEMOGLOBIN GLYCOSYLATED A1C: CPT | Mod: QW

## 2019-07-22 RX ORDER — RIFAXIMIN 550 MG/1
550 TABLET ORAL
Refills: 0 | Status: DISCONTINUED | COMMUNITY
Start: 2019-03-20 | End: 2019-07-22

## 2019-07-22 NOTE — REVIEW OF SYSTEMS
[Heartburn] : heartburn [Back Pain] : back pain [Pain/Numbness of Digits] : pain/numbness of digits [Negative] : Respiratory [Polyuria] : no polyuria [Polydipsia] : no polydipsia [Swelling] : no swelling

## 2019-07-22 NOTE — ASSESSMENT
[Carbohydrate Consistent Diet] : carbohydrate consistent diet [Long Term Vascular Complications] : long term vascular complications of diabetes [Diabetes Foot Care] : diabetes foot care [Importance of Diet and Exercise] : importance of diet and exercise to improve glycemic control, achieve weight loss and improve cardiovascular health [FreeTextEntry1] : 48 y.o. male with h/o Type 2 DM, HTN and hyperlipidemia.\par 1. Type 2 DM- Suboptimal control with Hba1c of 8.7%. Encouraged carbohydrate consistent diet and exercise. Will continue glipizide ER 10 mg daily and start Jardiance 10 mg daily. Reviewed risks and benefits. Encouraged increase in SMBG and forwarding blood glucose log. Will check urine microalb/cr ratio.\par 2. HTN- BP is at goal and will continue medications\par 3. Hyperlipidemia- Will continue statin. Will check lipids. \par \par Follow up in 3 to 4 months\par Prefers to have lab work next month with PCP

## 2019-07-22 NOTE — HISTORY OF PRESENT ILLNESS
[FreeTextEntry1] : 48 y.o. male with h/o Type 2 DM diagnosed in 2/2017, HTN and hyperlipidemia here for follow up visit. No acute events since last visit. Reports GERD and diarrhea which increased in the first 2 days after Trulicity administration and therefore stopped it March 2019. Also has Barretts. Intolerant of Metformin. Taking glipizide ER 10 mg daily. Not monitoring FS at home.  Diet could be better.  UTD with optho and no retinopathy. Saw podiatry. No proteinuria. Feeling good. No polyuria and no polydipsia. Dealing with back and neck pain. Went to pain management for lumbar nerve blocks. \par \par For breakfast: 2 eggs, 1 slice toast, chiang and home fries\par For lunch: may skip, cheeseburger with bun\par For dinner: salad, chicken cutlet and asparagus\par Crystal light, no sodas or juices\par

## 2019-07-22 NOTE — PHYSICAL EXAM
[Alert] : alert [Normal Sclera/Conjunctiva] : normal sclera/conjunctiva [EOMI] : extra ocular movement intact [No Acute Distress] : no acute distress [Thyroid Not Enlarged] : the thyroid was not enlarged [No LAD] : no lymphadenopathy [Supple] : the neck was supple [Clear to Auscultation] : lungs were clear to auscultation bilaterally [No Accessory Muscle Use] : no accessory muscle use [Normal S1, S2] : normal S1 and S2 [Regular Rhythm] : with a regular rhythm [No Edema] : there was no peripheral edema [Normal Bowel Sounds] : normal bowel sounds [Soft] : abdomen soft [Not Tender] : non-tender [No Clubbing, Cyanosis] : no clubbing  or cyanosis of the fingernails [Not Distended] : not distended [Acanthosis Nigricans] : acanthosis nigricans [No Rash] : no rash [Right Foot Was Examined] : right foot ~C was examined [Left Foot Was Examined] : left foot ~C was examined [2+] : 2+ in the dorsalis pedis [Normal Reflexes] : deep tendon reflexes were 2+ and symmetric [Normal Affect] : the affect was normal [Normal Mood] : the mood was normal [Diminished Throughout Both Feet] : normal tactile sensation with monofilament testing throughout both feet [FreeTextEntry1] : callus [FreeTextEntry5] : callus

## 2019-07-23 ENCOUNTER — MEDICATION RENEWAL (OUTPATIENT)
Age: 49
End: 2019-07-23

## 2019-07-23 LAB
CREAT SPEC-SCNC: 133 MG/DL
MICROALBUMIN 24H UR DL<=1MG/L-MCNC: <1.2 MG/DL
MICROALBUMIN/CREAT 24H UR-RTO: NORMAL MG/G

## 2019-07-23 RX ORDER — BLOOD SUGAR DIAGNOSTIC
STRIP MISCELLANEOUS
Qty: 1 | Refills: 3 | Status: DISCONTINUED | COMMUNITY
Start: 2019-07-22 | End: 2019-07-23

## 2019-07-23 RX ORDER — LANCETS 28 GAUGE
EACH MISCELLANEOUS
Qty: 100 | Refills: 3 | Status: DISCONTINUED | COMMUNITY
Start: 2019-07-22 | End: 2019-07-23

## 2019-07-23 RX ORDER — LANCETS 33 GAUGE
EACH MISCELLANEOUS
Qty: 100 | Refills: 3 | Status: ACTIVE | COMMUNITY
Start: 2019-07-23 | End: 1900-01-01

## 2019-07-23 RX ORDER — BLOOD-GLUCOSE METER
W/DEVICE EACH MISCELLANEOUS
Qty: 1 | Refills: 0 | Status: ACTIVE | COMMUNITY
Start: 2019-07-23 | End: 1900-01-01

## 2019-08-06 ENCOUNTER — OUTPATIENT (OUTPATIENT)
Dept: OUTPATIENT SERVICES | Facility: HOSPITAL | Age: 49
LOS: 1 days | End: 2019-08-06
Payer: COMMERCIAL

## 2019-08-06 ENCOUNTER — APPOINTMENT (OUTPATIENT)
Dept: UROLOGY | Facility: CLINIC | Age: 49
End: 2019-08-06
Payer: COMMERCIAL

## 2019-08-06 DIAGNOSIS — Z98.890 OTHER SPECIFIED POSTPROCEDURAL STATES: Chronic | ICD-10-CM

## 2019-08-06 DIAGNOSIS — Z98.1 ARTHRODESIS STATUS: Chronic | ICD-10-CM

## 2019-08-06 DIAGNOSIS — N50.819 TESTICULAR PAIN, UNSPECIFIED: ICD-10-CM

## 2019-08-06 PROCEDURE — 93975 VASCULAR STUDY: CPT | Mod: 26

## 2019-08-06 PROCEDURE — 99214 OFFICE O/P EST MOD 30 MIN: CPT | Mod: 25

## 2019-08-06 PROCEDURE — 76870 US EXAM SCROTUM: CPT | Mod: 26

## 2019-08-06 PROCEDURE — 76870 US EXAM SCROTUM: CPT

## 2019-08-06 PROCEDURE — 93975 VASCULAR STUDY: CPT

## 2019-08-06 NOTE — PHYSICAL EXAM
[General Appearance - Well Developed] : well developed [Normal Appearance] : normal appearance [General Appearance - Well Nourished] : well nourished [Well Groomed] : well groomed [General Appearance - In No Acute Distress] : no acute distress [Abdomen Soft] : soft [Urethral Meatus] : meatus normal [Urinary Bladder Findings] : the bladder was normal on palpation [Scrotum] : the scrotum was normal [Testes Mass (___cm)] : there were no testicular masses [Skin Turgor] : supple [Oriented To Time, Place, And Person] : oriented to person, place, and time [Affect] : the affect was normal [Mood] : the mood was normal [Not Anxious] : not anxious [Normal Station and Gait] : the gait and station were normal for the patient's age

## 2019-08-06 NOTE — HISTORY OF PRESENT ILLNESS
[FreeTextEntry1] : The patient presents 3-4 months s/p a  left microsurgical spermatic cord denervation.. He is doing well and has no complaints.\par \par PMH: Patient initially presented with the chief complaint of left epididymal pain for evaluation. The patient stated that he had a surgery to correct his Peyronies disease performed by Dr. Grabiel Ponce in 2005. He stated that he had a bilateral varicocele ligation in 2008, a L5-L8xsijet fusion in 2008 and a bilateral inguinal hernia repair performed in 2009.  Since having the procedure for Peyronies disease he has had left epididymal pain. he is a diabetic and has had a pulmonary embolism treated with Xarelto which he is now off. A left spermatic cord block reduced his epididymal pain by greater than 75%.  He has no known drug allergies.  His past medical history demonstrates no significant urologic issues.  He is  without children and does not have the desire to have children.  In his present occupation as a  for the [employer] system he has no known toxin exposure.  He does smoke and drinks only socially.  He has no known drug allergies.  His review of systems is non-contributory. His family history is not significant.\par

## 2019-08-06 NOTE — ASSESSMENT
[FreeTextEntry1] : The patient presents 3-4 months s/p a  left microsurgical spermatic cord denervation.. He is doing well and has no complaints. A scrotal ultrasound was requested. He also states he has some dental nerve entrapment from the surgery. He will be following up with a pain therapist. I will see him back in 6 months in followup.\par

## 2019-08-16 DIAGNOSIS — N50.819 TESTICULAR PAIN, UNSPECIFIED: ICD-10-CM

## 2019-08-27 ENCOUNTER — APPOINTMENT (OUTPATIENT)
Dept: UROLOGY | Facility: CLINIC | Age: 49
End: 2019-08-27

## 2019-09-02 ENCOUNTER — FORM ENCOUNTER (OUTPATIENT)
Age: 49
End: 2019-09-02

## 2019-09-02 ENCOUNTER — EMERGENCY (EMERGENCY)
Facility: HOSPITAL | Age: 49
LOS: 1 days | Discharge: LEFT BEFORE DISCHARGE | End: 2019-09-02
Admitting: EMERGENCY MEDICINE

## 2019-09-02 VITALS
TEMPERATURE: 98 F | RESPIRATION RATE: 16 BRPM | WEIGHT: 225.09 LBS | DIASTOLIC BLOOD PRESSURE: 90 MMHG | HEART RATE: 73 BPM | SYSTOLIC BLOOD PRESSURE: 124 MMHG | OXYGEN SATURATION: 99 % | HEIGHT: 69 IN

## 2019-09-02 DIAGNOSIS — Z98.1 ARTHRODESIS STATUS: Chronic | ICD-10-CM

## 2019-09-02 DIAGNOSIS — Z98.890 OTHER SPECIFIED POSTPROCEDURAL STATES: Chronic | ICD-10-CM

## 2019-09-03 ENCOUNTER — OUTPATIENT (OUTPATIENT)
Dept: OUTPATIENT SERVICES | Facility: HOSPITAL | Age: 49
LOS: 1 days | End: 2019-09-03
Payer: COMMERCIAL

## 2019-09-03 ENCOUNTER — APPOINTMENT (OUTPATIENT)
Dept: ULTRASOUND IMAGING | Facility: IMAGING CENTER | Age: 49
End: 2019-09-03
Payer: COMMERCIAL

## 2019-09-03 DIAGNOSIS — I26.99 OTHER PULMONARY EMBOLISM WITHOUT ACUTE COR PULMONALE: ICD-10-CM

## 2019-09-03 DIAGNOSIS — Z98.890 OTHER SPECIFIED POSTPROCEDURAL STATES: Chronic | ICD-10-CM

## 2019-09-03 DIAGNOSIS — Z98.1 ARTHRODESIS STATUS: Chronic | ICD-10-CM

## 2019-09-03 PROCEDURE — 93971 EXTREMITY STUDY: CPT

## 2019-09-03 PROCEDURE — 93971 EXTREMITY STUDY: CPT | Mod: 26,LT

## 2019-11-20 ENCOUNTER — APPOINTMENT (OUTPATIENT)
Dept: ENDOCRINOLOGY | Facility: CLINIC | Age: 49
End: 2019-11-20
Payer: COMMERCIAL

## 2019-11-20 VITALS
HEART RATE: 78 BPM | HEIGHT: 69 IN | BODY MASS INDEX: 32.58 KG/M2 | SYSTOLIC BLOOD PRESSURE: 140 MMHG | DIASTOLIC BLOOD PRESSURE: 100 MMHG | OXYGEN SATURATION: 98 % | WEIGHT: 220 LBS

## 2019-11-20 PROCEDURE — 99214 OFFICE O/P EST MOD 30 MIN: CPT

## 2019-11-20 RX ORDER — PAROXETINE HYDROCHLORIDE 10 MG/1
10 TABLET, FILM COATED ORAL
Refills: 0 | Status: ACTIVE | COMMUNITY
Start: 2019-11-20

## 2019-11-20 RX ORDER — EMPAGLIFLOZIN 10 MG/1
10 TABLET, FILM COATED ORAL
Qty: 90 | Refills: 3 | Status: DISCONTINUED | COMMUNITY
Start: 2019-07-22 | End: 2019-11-20

## 2019-11-20 NOTE — DATA REVIEWED
[FreeTextEntry1] : Labs\par 10/16/19\par HBa1c 7.9%\par LDL 84 tri 203 chol 148 HDL 35\par glucose 199\par BUN/cr 13/0.83\par AST 52 ALT 68\par TSH 2.16 Free T4 0.9\par

## 2019-11-20 NOTE — HISTORY OF PRESENT ILLNESS
[FreeTextEntry1] : 49 y.o. male with h/o Type 2 DM diagnosed in 2/2017, HTN and hyperlipidemia here for follow up visit. No acute events since last visit. Reports GERD and diarrhea which increased in the first 2 days after Trulicity administration and therefore stopped it March 2019. Also has Barretts. Intolerant of Metformin. Taking glipizide ER 10 mg daily and Jardiance 10 mg daily. Not monitoring FS at home.  Diet is better.  UTD with ophthalmology (April 2019) and no retinopathy. Saw podiatry this summer. No proteinuria. Feeling good. No polyuria and no polydipsia. Dealing with back and neck pain. Went to pain management for lumbar nerve blocks. Most likely needs c spine surgery. \par \par For breakfast: 2 eggs, or BLT\par For lunch: may skip, cheeseburger with bun\par For dinner: salad, chicken cutlet and asparagus\par Crystal light, no sodas or juices\par

## 2019-11-20 NOTE — ASSESSMENT
[FreeTextEntry1] : 49 y.o. male with h/o Type 2 DM, HTN and hyperlipidemia.\par 1. Type 2 DM- Suboptimal control with Hba1c of 7.9% but improving. Encouraged carbohydrate consistent diet and exercise. Will continue glipizide ER 10 mg daily and will increase Jardiance to 25 mg daily. Reviewed risks and benefits. Encouraged increase in SMBG and forwarding blood glucose log. Normal urine microalb/cr ratio in June 2019.\par 2. HTN- BP is elevated today but patient did not take medications today. Stressed compliance with medications and patient will follow up with cardiology.\par 3. Hyperlipidemia- Will continue statin. \par \par Follow up in 3 to 4 months [Carbohydrate Consistent Diet] : carbohydrate consistent diet [Diabetes Foot Care] : diabetes foot care [Long Term Vascular Complications] : long term vascular complications of diabetes [Importance of Diet and Exercise] : importance of diet and exercise to improve glycemic control, achieve weight loss and improve cardiovascular health

## 2019-11-20 NOTE — REVIEW OF SYSTEMS
[Recent Weight Loss (___ Lbs)] : recent [unfilled] ~Ulb weight loss [Heartburn] : heartburn [Polyuria] : no polyuria [Pain/Numbness of Digits] : pain/numbness of digits [Back Pain] : back pain [Swelling] : no swelling [Polydipsia] : no polydipsia [Negative] : Respiratory [FreeTextEntry4] : posterior neck pain

## 2019-11-20 NOTE — PHYSICAL EXAM
[No Acute Distress] : no acute distress [Alert] : alert [Normal Sclera/Conjunctiva] : normal sclera/conjunctiva [EOMI] : extra ocular movement intact [No LAD] : no lymphadenopathy [Supple] : the neck was supple [Thyroid Not Enlarged] : the thyroid was not enlarged [No Accessory Muscle Use] : no accessory muscle use [Normal S1, S2] : normal S1 and S2 [Clear to Auscultation] : lungs were clear to auscultation bilaterally [Regular Rhythm] : with a regular rhythm [No Edema] : there was no peripheral edema [Not Tender] : non-tender [Normal Bowel Sounds] : normal bowel sounds [Not Distended] : not distended [Soft] : abdomen soft [No Clubbing, Cyanosis] : no clubbing  or cyanosis of the fingernails [No Rash] : no rash [Right Foot Was Examined] : right foot ~C was examined [Acanthosis Nigricans] : acanthosis nigricans [Left Foot Was Examined] : left foot ~C was examined [Normal] : normal [2+] : 2+ in the dorsalis pedis [Diminished Throughout Both Feet] : normal tactile sensation with monofilament testing throughout both feet [Normal Reflexes] : deep tendon reflexes were 2+ and symmetric [Normal Mood] : the mood was normal [Normal Affect] : the affect was normal [FreeTextEntry1] : callus [FreeTextEntry5] : callus

## 2019-11-22 ENCOUNTER — OUTPATIENT (OUTPATIENT)
Dept: OUTPATIENT SERVICES | Facility: HOSPITAL | Age: 49
LOS: 1 days | Discharge: HOME | End: 2019-11-22

## 2019-11-22 DIAGNOSIS — D64.9 ANEMIA, UNSPECIFIED: ICD-10-CM

## 2019-11-22 DIAGNOSIS — Z98.890 OTHER SPECIFIED POSTPROCEDURAL STATES: Chronic | ICD-10-CM

## 2019-11-22 DIAGNOSIS — Z98.1 ARTHRODESIS STATUS: Chronic | ICD-10-CM

## 2019-12-17 ENCOUNTER — OUTPATIENT (OUTPATIENT)
Dept: OUTPATIENT SERVICES | Facility: HOSPITAL | Age: 49
LOS: 1 days | Discharge: HOME | End: 2019-12-17

## 2019-12-17 DIAGNOSIS — Z98.890 OTHER SPECIFIED POSTPROCEDURAL STATES: Chronic | ICD-10-CM

## 2019-12-17 DIAGNOSIS — D64.9 ANEMIA, UNSPECIFIED: ICD-10-CM

## 2019-12-17 DIAGNOSIS — Z98.1 ARTHRODESIS STATUS: Chronic | ICD-10-CM

## 2019-12-20 ENCOUNTER — APPOINTMENT (OUTPATIENT)
Dept: HEMATOLOGY ONCOLOGY | Facility: CLINIC | Age: 49
End: 2019-12-20

## 2019-12-20 ENCOUNTER — RESULT REVIEW (OUTPATIENT)
Age: 49
End: 2019-12-20

## 2019-12-20 LAB
BASOPHILS # BLD AUTO: 0.1 K/UL — SIGNIFICANT CHANGE UP (ref 0–0.2)
BASOPHILS NFR BLD AUTO: 0.8 % — SIGNIFICANT CHANGE UP (ref 0–2)
EOSINOPHIL # BLD AUTO: 0.2 K/UL — SIGNIFICANT CHANGE UP (ref 0–0.5)
EOSINOPHIL NFR BLD AUTO: 2.7 % — SIGNIFICANT CHANGE UP (ref 0–6)
HCT VFR BLD CALC: 50.2 % — HIGH (ref 39–50)
HGB BLD-MCNC: 17 G/DL — SIGNIFICANT CHANGE UP (ref 13–17)
LYMPHOCYTES # BLD AUTO: 2.9 K/UL — SIGNIFICANT CHANGE UP (ref 1–3.3)
LYMPHOCYTES # BLD AUTO: 32.9 % — SIGNIFICANT CHANGE UP (ref 13–44)
MCHC RBC-ENTMCNC: 29 PG — SIGNIFICANT CHANGE UP (ref 27–34)
MCHC RBC-ENTMCNC: 33.9 G/DL — SIGNIFICANT CHANGE UP (ref 32–36)
MCV RBC AUTO: 85.8 FL — SIGNIFICANT CHANGE UP (ref 80–100)
MONOCYTES # BLD AUTO: 0.6 K/UL — SIGNIFICANT CHANGE UP (ref 0–0.9)
MONOCYTES NFR BLD AUTO: 7.3 % — SIGNIFICANT CHANGE UP (ref 2–14)
NEUTROPHILS # BLD AUTO: 5 K/UL — SIGNIFICANT CHANGE UP (ref 1.8–7.4)
NEUTROPHILS NFR BLD AUTO: 56.3 % — SIGNIFICANT CHANGE UP (ref 43–77)
PLATELET # BLD AUTO: 214 K/UL — SIGNIFICANT CHANGE UP (ref 150–400)
RBC # BLD: 5.85 M/UL — HIGH (ref 4.2–5.8)
RBC # FLD: 17.6 % — HIGH (ref 10.3–14.5)
WBC # BLD: 8.8 K/UL — SIGNIFICANT CHANGE UP (ref 3.8–10.5)
WBC # FLD AUTO: 8.8 K/UL — SIGNIFICANT CHANGE UP (ref 3.8–10.5)

## 2019-12-24 ENCOUNTER — APPOINTMENT (OUTPATIENT)
Dept: HEMATOLOGY ONCOLOGY | Facility: CLINIC | Age: 49
End: 2019-12-24
Payer: COMMERCIAL

## 2019-12-24 VITALS
BODY MASS INDEX: 33.37 KG/M2 | HEART RATE: 73 BPM | OXYGEN SATURATION: 98 % | TEMPERATURE: 207.32 F | DIASTOLIC BLOOD PRESSURE: 93 MMHG | RESPIRATION RATE: 16 BRPM | SYSTOLIC BLOOD PRESSURE: 144 MMHG | WEIGHT: 225.97 LBS

## 2019-12-24 DIAGNOSIS — D75.1 SECONDARY POLYCYTHEMIA: ICD-10-CM

## 2019-12-24 PROCEDURE — 99214 OFFICE O/P EST MOD 30 MIN: CPT

## 2019-12-24 NOTE — ASSESSMENT
[FreeTextEntry1] : This is a 49 year old male with long standing polycythemia likely secondary to AISSATOU.  His Jak2 V617/exon 12 negative.  \par He has been pretty stable without regular phlebotomy.  His blood counts improved after smoking cessation +/- weight loss.  . \par He had unprovoked extensive bilateral pulmonary emboli- uncertain etiology.  He has had no evidence of malignancy, recent GI work up.  \par Hypercoagulable work up consistent with heterozygous prothrombin gene mutation. \par Repeat scan with resolution of PE. \par Though this is his first episode of clot, even with a potential hypercoagulable state does not subject to lifelong anticoagulation- he is off AC.  Though with the potential surgery, he should be on early DVT prophylaxis and remain on it for 6 weeks post operatively.  \par Repeat chest CT w/o contrast in 4/2019 stable.  \par No further oral iron, plan for a small phlebotomy on Friday.  Discussed that with secondary disease we do not need to have a goal Hct, thrombosis is unlikely but patient is concerned. \par Post cervical surgery, at next visit can discuss continuing secondary work up.  \par Routine HCM.  \par He will follow up in 1 month.

## 2019-12-24 NOTE — HISTORY OF PRESENT ILLNESS
[de-identified] : Patient presents for a follow up appointment.  He was concerned because he was recently told he was iron deficient in November.  He started oral iron, believes that it could be related to blood donation a few months earlier.  He denies any bleeding.  He is concerned, he is awaiting surgery for his neck at [facility] with Dr. Davis Canada for cervical neck issues.  He denies any smoking, no weight loss, stable appetite.  He has no other symptoms other than M/S complaints.\par  [de-identified] : Secondary polycythemia, Jak2 V617, exon 12 negative\par Pulmonary embolism 2018- heterozygous mutation for prothrombin gene mutation off anticoagulation\par \par

## 2019-12-27 ENCOUNTER — APPOINTMENT (OUTPATIENT)
Dept: INFUSION THERAPY | Facility: HOSPITAL | Age: 49
End: 2019-12-27

## 2019-12-30 DIAGNOSIS — D75.1 SECONDARY POLYCYTHEMIA: ICD-10-CM

## 2020-01-08 ENCOUNTER — APPOINTMENT (OUTPATIENT)
Dept: SPINE | Facility: CLINIC | Age: 50
End: 2020-01-08
Payer: OTHER MISCELLANEOUS

## 2020-01-08 VITALS
HEIGHT: 69 IN | OXYGEN SATURATION: 93 % | WEIGHT: 220 LBS | SYSTOLIC BLOOD PRESSURE: 122 MMHG | TEMPERATURE: 208.4 F | DIASTOLIC BLOOD PRESSURE: 80 MMHG | BODY MASS INDEX: 32.58 KG/M2 | HEART RATE: 70 BPM

## 2020-01-08 DIAGNOSIS — M54.12 RADICULOPATHY, CERVICAL REGION: ICD-10-CM

## 2020-01-08 DIAGNOSIS — M54.16 RADICULOPATHY, LUMBAR REGION: ICD-10-CM

## 2020-01-08 DIAGNOSIS — Z86.39 PERSONAL HISTORY OF OTHER ENDOCRINE, NUTRITIONAL AND METABOLIC DISEASE: ICD-10-CM

## 2020-01-08 DIAGNOSIS — Z78.9 OTHER SPECIFIED HEALTH STATUS: ICD-10-CM

## 2020-01-08 DIAGNOSIS — Z86.79 PERSONAL HISTORY OF OTHER DISEASES OF THE CIRCULATORY SYSTEM: ICD-10-CM

## 2020-01-08 PROCEDURE — 99244 OFF/OP CNSLTJ NEW/EST MOD 40: CPT

## 2020-04-10 LAB
FERRITIN SERPL-MCNC: 75 NG/ML
IRON SATN MFR SERPL: 34 %
IRON SERPL-MCNC: 89 UG/DL
TIBC SERPL-MCNC: 259 UG/DL
TRANSFERRIN SERPL-MCNC: 218 MG/DL
UIBC SERPL-MCNC: 170 UG/DL

## 2020-04-29 ENCOUNTER — APPOINTMENT (OUTPATIENT)
Dept: ENDOCRINOLOGY | Facility: CLINIC | Age: 50
End: 2020-04-29

## 2020-05-13 ENCOUNTER — APPOINTMENT (OUTPATIENT)
Dept: ENDOCRINOLOGY | Facility: CLINIC | Age: 50
End: 2020-05-13
Payer: MEDICARE

## 2020-05-13 VITALS
BODY MASS INDEX: 33.33 KG/M2 | SYSTOLIC BLOOD PRESSURE: 100 MMHG | OXYGEN SATURATION: 97 % | DIASTOLIC BLOOD PRESSURE: 70 MMHG | HEIGHT: 69 IN | WEIGHT: 225 LBS | HEART RATE: 78 BPM

## 2020-05-13 VITALS — TEMPERATURE: 208.4 F

## 2020-05-13 LAB
GLUCOSE BLDC GLUCOMTR-MCNC: 148
HBA1C MFR BLD HPLC: 8.3

## 2020-05-13 PROCEDURE — 99214 OFFICE O/P EST MOD 30 MIN: CPT | Mod: 25

## 2020-05-13 PROCEDURE — 82962 GLUCOSE BLOOD TEST: CPT

## 2020-05-13 PROCEDURE — 95251 CONT GLUC MNTR ANALYSIS I&R: CPT

## 2020-05-13 PROCEDURE — 83036 HEMOGLOBIN GLYCOSYLATED A1C: CPT | Mod: QW

## 2020-05-13 RX ORDER — PROPRANOLOL HYDROCHLORIDE 10 MG/1
10 TABLET ORAL
Refills: 0 | Status: DISCONTINUED | COMMUNITY
End: 2020-05-13

## 2020-05-13 NOTE — HISTORY OF PRESENT ILLNESS
[FreeTextEntry1] : 49 y.o. male with h/o Type 2 DM diagnosed in 2/2017, HTN and hyperlipidemia here for follow up visit. No acute events since last visit. Reports GERD and diarrhea which increased in the first 2 days after Trulicity administration and therefore stopped it March 2019. Also has Barretts. Intolerant of Metformin. Taking glipizide ER 10 mg daily and Jardiance 25 mg daily. Not monitoring FS at home.  Diet is worse and eating more with pandemic. Due for ophthalmology (last visit was in April 2019) and no retinopathy. Saw podiatry past summer. No proteinuria. Feeling good. No polyuria and no polydipsia. Dealing with back and neck pain. Went to pain management for lumbar nerve blocks and had radiofrequency. Most likely needs c spine surgery but not interested yet. Had left LE vein procedure in March 2020 and not happy. \par \par For breakfast: 2 eggs, or BLT\par For lunch: may skip, cheeseburger with bun, salad\par For dinner: salad, chicken cutlet and asparagus, corn\par Crystal light, no sodas or juices\par Cheating with nuts or Chex mix or pretzels\par

## 2020-05-13 NOTE — REVIEW OF SYSTEMS
[Recent Weight Gain (___ Lbs)] : recent [unfilled] ~Ulb weight gain [Back Pain] : back pain [Heartburn] : heartburn [Pain/Numbness of Digits] : pain/numbness of digits [Negative] : Respiratory [Recent Weight Loss (___ Lbs)] : no recent weight loss [Polyuria] : no polyuria [Polydipsia] : no polydipsia [Swelling] : no swelling [FreeTextEntry4] : posterior neck pain

## 2020-05-13 NOTE — PHYSICAL EXAM
[Alert] : alert [No Acute Distress] : no acute distress [Normal Sclera/Conjunctiva] : normal sclera/conjunctiva [EOMI] : extra ocular movement intact [No LAD] : no lymphadenopathy [Thyroid Not Enlarged] : the thyroid was not enlarged [No Thyroid Nodules] : no palpable thyroid nodules [No Respiratory Distress] : no respiratory distress [Clear to Auscultation] : lungs were clear to auscultation bilaterally [Normal S1, S2] : normal S1 and S2 [Regular Rhythm] : with a regular rhythm [Pedal Pulses Normal] : the pedal pulses are present [No Edema] : no peripheral edema [Normal Bowel Sounds] : normal bowel sounds [Not Tender] : non-tender [Not Distended] : not distended [Soft] : abdomen soft [Normal Anterior Cervical Nodes] : no anterior cervical lymphadenopathy [No Clubbing, Cyanosis] : no clubbing  or cyanosis of the fingernails [No Rash] : no rash [Normal Reflexes] : deep tendon reflexes were 2+ and symmetric [Normal Affect] : the affect was normal [Normal Mood] : the mood was normal [de-identified] : umbilical hernia noted

## 2020-05-13 NOTE — ASSESSMENT
[Carbohydrate Consistent Diet] : carbohydrate consistent diet [Diabetes Foot Care] : diabetes foot care [Long Term Vascular Complications] : long term vascular complications of diabetes [Importance of Diet and Exercise] : importance of diet and exercise to improve glycemic control, achieve weight loss and improve cardiovascular health [FreeTextEntry1] : 49 y.o. male with h/o Type 2 DM, HTN and hyperlipidemia.\par 1. Type 2 DM- Suboptimal control with Hba1c of 8.3 today. Encouraged carbohydrate consistent diet and exercise. Will continue glipizide ER 10 mg daily and Jardiance 25 mg daily. Reviewed risks and benefits. Encouraged increase in SMBG and forwarding blood glucose log. Freestyle Fara Pro placed today to help obtain more information. Will check CMP and urine microalb/cr ratio. \par 2. HTN- BP is at goal. Will continue current medications including Losartan\par 3. Hyperlipidemia- Will check lipids. Will continue statin. \par \par Follow up in 3 to 4 months\par Follow up with opthalmology, podiatry and vascular

## 2020-05-15 LAB
25(OH)D3 SERPL-MCNC: 31.4 NG/ML
ALBUMIN SERPL ELPH-MCNC: 4.4 G/DL
ALP BLD-CCNC: 66 U/L
ALT SERPL-CCNC: 66 U/L
ANION GAP SERPL CALC-SCNC: 14 MMOL/L
AST SERPL-CCNC: 43 U/L
BASOPHILS # BLD AUTO: 0.03 K/UL
BASOPHILS NFR BLD AUTO: 0.5 %
BILIRUB SERPL-MCNC: 0.3 MG/DL
BUN SERPL-MCNC: 11 MG/DL
CALCIUM SERPL-MCNC: 9.5 MG/DL
CHLORIDE SERPL-SCNC: 104 MMOL/L
CHOLEST SERPL-MCNC: 146 MG/DL
CHOLEST/HDLC SERPL: 4.2 RATIO
CO2 SERPL-SCNC: 24 MMOL/L
CREAT SERPL-MCNC: 1.05 MG/DL
CREAT SPEC-SCNC: 304 MG/DL
EOSINOPHIL # BLD AUTO: 0.09 K/UL
EOSINOPHIL NFR BLD AUTO: 1.5 %
GLUCOSE SERPL-MCNC: 162 MG/DL
HCT VFR BLD CALC: 41.1 %
HDLC SERPL-MCNC: 34 MG/DL
HGB BLD-MCNC: 12.7 G/DL
IMM GRANULOCYTES NFR BLD AUTO: 0.2 %
LDLC SERPL CALC-MCNC: 62 MG/DL
LYMPHOCYTES # BLD AUTO: 1.83 K/UL
LYMPHOCYTES NFR BLD AUTO: 30.9 %
MAN DIFF?: NORMAL
MCHC RBC-ENTMCNC: 25.2 PG
MCHC RBC-ENTMCNC: 30.9 GM/DL
MCV RBC AUTO: 81.7 FL
MICROALBUMIN 24H UR DL<=1MG/L-MCNC: <1.2 MG/DL
MICROALBUMIN/CREAT 24H UR-RTO: NORMAL MG/G
MONOCYTES # BLD AUTO: 0.43 K/UL
MONOCYTES NFR BLD AUTO: 7.3 %
NEUTROPHILS # BLD AUTO: 3.54 K/UL
NEUTROPHILS NFR BLD AUTO: 59.6 %
PLATELET # BLD AUTO: 257 K/UL
POTASSIUM SERPL-SCNC: 4.3 MMOL/L
PROT SERPL-MCNC: 6.3 G/DL
RBC # BLD: 5.03 M/UL
RBC # FLD: 14.3 %
SODIUM SERPL-SCNC: 142 MMOL/L
TRIGL SERPL-MCNC: 247 MG/DL
TSH SERPL-ACNC: 1.5 UIU/ML
VIT B12 SERPL-MCNC: 718 PG/ML
WBC # FLD AUTO: 5.93 K/UL

## 2020-05-21 ENCOUNTER — APPOINTMENT (OUTPATIENT)
Dept: VASCULAR SURGERY | Facility: CLINIC | Age: 50
End: 2020-05-21
Payer: MEDICARE

## 2020-05-21 VITALS
TEMPERATURE: 208.04 F | HEIGHT: 69 IN | DIASTOLIC BLOOD PRESSURE: 84 MMHG | SYSTOLIC BLOOD PRESSURE: 132 MMHG | BODY MASS INDEX: 33.33 KG/M2 | WEIGHT: 225 LBS | HEART RATE: 64 BPM

## 2020-05-21 PROCEDURE — 99203 OFFICE O/P NEW LOW 30 MIN: CPT

## 2020-05-21 PROCEDURE — 93971 EXTREMITY STUDY: CPT

## 2020-05-21 NOTE — HISTORY OF PRESENT ILLNESS
[FreeTextEntry1] : A 49 year old man who had left GSV ablation in the past in outside place and presents with leg swelling. He denies history of DVT. He has been wearing stocking and exercising but not helping much

## 2020-05-21 NOTE — ASSESSMENT
[FreeTextEntry1] : A 49 year old man with swelling in the left leg after left GSV ablation. No evidence of DVT or SVT.

## 2020-05-21 NOTE — PHYSICAL EXAM
[2+] : left 2+ [Ankle Swelling (On Exam)] : not present [Varicose Veins Of Lower Extremities] : present [Varicose Veins Of The Left Leg] : of the left leg [Ankle Swelling On The Left] : moderate [] : not present [FreeTextEntry1] : CEAP 3 with varicosities and swelling of the left leg

## 2020-05-27 ENCOUNTER — RX RENEWAL (OUTPATIENT)
Age: 50
End: 2020-05-27

## 2020-06-09 ENCOUNTER — APPOINTMENT (OUTPATIENT)
Dept: UROLOGY | Facility: CLINIC | Age: 50
End: 2020-06-09
Payer: MEDICARE

## 2020-06-09 DIAGNOSIS — N50.9 DISORDER OF MALE GENITAL ORGANS, UNSPECIFIED: ICD-10-CM

## 2020-06-09 DIAGNOSIS — I86.1 SCROTAL VARICES: ICD-10-CM

## 2020-06-09 PROCEDURE — 93975 VASCULAR STUDY: CPT | Mod: 26

## 2020-06-09 PROCEDURE — 76870 US EXAM SCROTUM: CPT | Mod: 26

## 2020-06-09 PROCEDURE — 99214 OFFICE O/P EST MOD 30 MIN: CPT | Mod: 25

## 2020-06-09 NOTE — ASSESSMENT
[FreeTextEntry1] : The patient returns for followup persistent left scrotal discomfort. Less than prior to this procedure and intermittent. 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 He is now admitted over a year after a left microsurgical spermatic cord denervation.\par \par The cyst on his perineum is less than a millimeter in diameter and appears to be going away on its own. I suggested warm pass and if it persists and giving him discomfort I can excise it. However, I discussed with him that he probably will dissipate on its own.\par \par I will obtain a scrotal ultrasound to evaluate his left scrotal discomfort after a microsurgical spermatic cord denervation.\par \par Consultation: 30 minutes  10 minutes reviewing his history and performing a physical examination.  20 minutes reviewing his ultrasound, discussing treatment options,  and writing his note..\par \par \par

## 2020-06-09 NOTE — HISTORY OF PRESENT ILLNESS
[FreeTextEntry1] : The patient returns for followup persistent left scrotal discomfort. Less than prior to this procedure and intermittent. 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 He is now admitted over a year after a left microsurgical spermatic cord denervation.\par \par PMH: Patient initially presented with the chief complaint of left epididymal pain for evaluation. The patient stated that he had a surgery to correct his Peyronies disease performed by Dr. Grabiel Ponce in 2005. He stated that he had a bilateral varicocele ligation in 2008, a L5-G5ethwqm fusion in 2008 and a bilateral inguinal hernia repair performed in 2009.  Since having the procedure for Peyronies disease he has had left epididymal pain. he is a diabetic and has had a pulmonary embolism treated with Xarelto which he is now off. A left spermatic cord block reduced his epididymal pain by greater than 75%.  He has no known drug allergies.  His past medical history demonstrates no significant urologic issues.  He is  without children and does not have the desire to have children.  In his present occupation as a  for the [employer] he has no known toxin exposure.  He does smoke and drinks only socially.  He has no known drug allergies.  His review of systems is non-contributory. His family history is not significant.\par

## 2020-06-17 ENCOUNTER — APPOINTMENT (OUTPATIENT)
Dept: SURGERY | Facility: CLINIC | Age: 50
End: 2020-06-17
Payer: MEDICARE

## 2020-06-17 VITALS
RESPIRATION RATE: 16 BRPM | WEIGHT: 225 LBS | TEMPERATURE: 209.12 F | OXYGEN SATURATION: 97 % | HEIGHT: 69 IN | DIASTOLIC BLOOD PRESSURE: 78 MMHG | SYSTOLIC BLOOD PRESSURE: 117 MMHG | HEART RATE: 75 BPM | BODY MASS INDEX: 33.33 KG/M2

## 2020-06-17 DIAGNOSIS — L29.0 PRURITUS ANI: ICD-10-CM

## 2020-06-17 PROCEDURE — 46600 DIAGNOSTIC ANOSCOPY SPX: CPT

## 2020-06-17 PROCEDURE — 99213 OFFICE O/P EST LOW 20 MIN: CPT | Mod: 25

## 2020-06-17 RX ORDER — HYDROCORTISONE 25 MG/G
2.5 CREAM TOPICAL
Qty: 1 | Refills: 0 | Status: ACTIVE | COMMUNITY
Start: 2020-06-17 | End: 1900-01-01

## 2020-06-17 NOTE — PHYSICAL EXAM
[FreeTextEntry1] : Perianal inspection demonstrated focal thickened skin folds in the right anterior position.  Touch tenderness noted.  No erythema and no induration.  Digital and anoscopic evaluation unremarkable.

## 2020-06-17 NOTE — ASSESSMENT
[FreeTextEntry1] : Focal thickening of skin of unknown etiology.  Trial of topical hydrocortisone and Balneol-HC for cleaning.  This does not appear to be hemorrhoidal in origin.  If patient's symptoms continue he will get a dermatology consult.

## 2020-06-17 NOTE — HISTORY OF PRESENT ILLNESS
[FreeTextEntry1] : Valentin is a 49 year old male here for a follow-up visit. Patient has had several rubber band ligations for his internal bleeding hemorrhoids. Last seen on 6/5/19 for banding in the right posterior position. Colonoscopy from 3/21/17 demonstrated grade 1 internal hemorrhoids. 3 mm polyp in the distal rectum. Pathology: Hyperplastic polyp. Normal mucosa in the rectum, sigmoid colon, descending colon, transverse colon, ascending colon and cecum. \par Today, patient c/o a painful "skin tag" described as "sharp", 5/10 pain level. Pain worse when he touches the area and when he walks. He denies rectal bleeding. Reports a hx of IBS, has 4-5 BMs a day, sometimes formed. Not taking any daily fiber supplements.

## 2020-06-25 ENCOUNTER — NON-APPOINTMENT (OUTPATIENT)
Age: 50
End: 2020-06-25

## 2020-06-25 ENCOUNTER — APPOINTMENT (OUTPATIENT)
Dept: OPHTHALMOLOGY | Facility: CLINIC | Age: 50
End: 2020-06-25
Payer: MEDICARE

## 2020-06-25 PROCEDURE — 92014 COMPRE OPH EXAM EST PT 1/>: CPT

## 2020-07-14 RX ORDER — BLOOD SUGAR DIAGNOSTIC
STRIP MISCELLANEOUS
Qty: 3 | Refills: 3 | Status: ACTIVE | COMMUNITY
Start: 2019-07-23

## 2020-08-05 ENCOUNTER — APPOINTMENT (OUTPATIENT)
Dept: ENDOCRINOLOGY | Facility: CLINIC | Age: 50
End: 2020-08-05
Payer: MEDICARE

## 2020-08-05 VITALS
HEART RATE: 85 BPM | SYSTOLIC BLOOD PRESSURE: 120 MMHG | HEIGHT: 69 IN | WEIGHT: 226 LBS | OXYGEN SATURATION: 97 % | BODY MASS INDEX: 33.47 KG/M2 | TEMPERATURE: 208.94 F | DIASTOLIC BLOOD PRESSURE: 80 MMHG

## 2020-08-05 LAB
GLUCOSE BLDC GLUCOMTR-MCNC: 226
HBA1C MFR BLD HPLC: 7.5

## 2020-08-05 PROCEDURE — 83036 HEMOGLOBIN GLYCOSYLATED A1C: CPT | Mod: QW

## 2020-08-05 PROCEDURE — 82962 GLUCOSE BLOOD TEST: CPT

## 2020-08-05 PROCEDURE — 99214 OFFICE O/P EST MOD 30 MIN: CPT | Mod: 25

## 2020-08-05 NOTE — ASSESSMENT
[Carbohydrate Consistent Diet] : carbohydrate consistent diet [Diabetes Foot Care] : diabetes foot care [Importance of Diet and Exercise] : importance of diet and exercise to improve glycemic control, achieve weight loss and improve cardiovascular health [Long Term Vascular Complications] : long term vascular complications of diabetes [FreeTextEntry1] : 49 y.o. male with h/o Type 2 DM, HTN and hyperlipidemia.\par 1. Type 2 DM- Fair control with Hba1c of 7.5% today. Encouraged carbohydrate consistent diet and exercise. Will continue glipizide ER 10 mg daily and Jardiance 25 mg daily. Reviewed risks and benefits. Encouraged increase in SMBG and forwarding blood glucose log. Stable CMP and urine microalb/cr ratio. \par 2. HTN- BP is at goal. Will continue current medications including Losartan\par 3. Hyperlipidemia- LDL is at goal. Will continue statin. \par \par Follow up in 3 to 4 months\par Follow up with opthalmology, podiatry and vascular

## 2020-08-05 NOTE — REVIEW OF SYSTEMS
[Heartburn] : heartburn [Back Pain] : back pain [Pain/Numbness of Digits] : pain/numbness of digits [Negative] : Respiratory [Recent Weight Gain (___ Lbs)] : no recent weight gain [Recent Weight Loss (___ Lbs)] : no recent weight loss [Polyuria] : no polyuria [Polydipsia] : no polydipsia [Swelling] : no swelling [FreeTextEntry4] : posterior neck pain

## 2020-08-05 NOTE — HISTORY OF PRESENT ILLNESS
[FreeTextEntry1] : 49 y.o. male with h/o Type 2 DM diagnosed in 2/2017, HTN and hyperlipidemia here for follow up visit. No acute events since last visit. Reports GERD and diarrhea which increased in the first 2 days after Trulicity administration and therefore stopped it in March 2019. Also has Barretts. Intolerant of Metformin. Taking glipizide ER 10 mg daily and Jardiance 25 mg daily. Not monitoring FS at home.  Diet is better now. UTD with ophthalmology (6/25/2020) and no retinopathy. Saw podiatry 2 months ago. No proteinuria. Feeling good. No polyuria and no polydipsia. Dealing with back and neck pain. Went to pain management for lumbar nerve blocks and had radiofrequency. Most likely needs c spine surgery but not interested yet. Had left LE vein procedure in March 2020 and not happy. \par \par For breakfast:  BLT\par For lunch: pizza\par For dinner: salad, chicken cutlet and asparagus, corn\par Crystal light, no sodas or juices\par Cheating with nuts or Chex mix or pretzels or potato chips\par

## 2020-08-05 NOTE — PHYSICAL EXAM
[Alert] : alert [No Acute Distress] : no acute distress [Normal Sclera/Conjunctiva] : normal sclera/conjunctiva [EOMI] : extra ocular movement intact [No LAD] : no lymphadenopathy [Thyroid Not Enlarged] : the thyroid was not enlarged [No Thyroid Nodules] : no palpable thyroid nodules [No Respiratory Distress] : no respiratory distress [Clear to Auscultation] : lungs were clear to auscultation bilaterally [Normal S1, S2] : normal S1 and S2 [Regular Rhythm] : with a regular rhythm [Pedal Pulses Normal] : the pedal pulses are present [No Edema] : no peripheral edema [Normal Bowel Sounds] : normal bowel sounds [Not Tender] : non-tender [Not Distended] : not distended [Soft] : abdomen soft [Normal Anterior Cervical Nodes] : no anterior cervical lymphadenopathy [No Clubbing, Cyanosis] : no clubbing  or cyanosis of the fingernails [No Rash] : no rash [Right Foot Was Examined] : right foot ~C was examined [Left Foot Was Examined] : left foot ~C was examined [2+] : 2+ in the dorsalis pedis [Normal] : normal [Diminished Throughout Both Feet] : diminished tactile sensation with monofilament testing throughout both feet [Normal Reflexes] : deep tendon reflexes were 2+ and symmetric [Normal Mood] : the mood was normal [Normal Affect] : the affect was normal [de-identified] : umbilical hernia noted [FreeTextEntry1] : callus [FreeTextEntry5] : callus

## 2020-08-17 ENCOUNTER — APPOINTMENT (OUTPATIENT)
Dept: VASCULAR SURGERY | Facility: CLINIC | Age: 50
End: 2020-08-17
Payer: MEDICARE

## 2020-08-17 PROCEDURE — 93971 EXTREMITY STUDY: CPT

## 2020-09-09 ENCOUNTER — APPOINTMENT (OUTPATIENT)
Dept: COLORECTAL SURGERY | Facility: CLINIC | Age: 50
End: 2020-09-09
Payer: MEDICARE

## 2020-09-09 VITALS
SYSTOLIC BLOOD PRESSURE: 135 MMHG | BODY MASS INDEX: 32.58 KG/M2 | TEMPERATURE: 97.8 F | DIASTOLIC BLOOD PRESSURE: 93 MMHG | WEIGHT: 220 LBS | HEIGHT: 69 IN | RESPIRATION RATE: 15 BRPM | HEART RATE: 78 BPM | OXYGEN SATURATION: 96 %

## 2020-09-09 PROCEDURE — 99215 OFFICE O/P EST HI 40 MIN: CPT | Mod: 25

## 2020-09-09 PROCEDURE — 46600 DIAGNOSTIC ANOSCOPY SPX: CPT

## 2020-09-09 RX ORDER — SILVER SULFADIAZINE 10 MG/G
1 CREAM TOPICAL
Qty: 400 | Refills: 3 | Status: DISCONTINUED | COMMUNITY
Start: 2018-02-08 | End: 2020-09-09

## 2020-09-09 NOTE — PHYSICAL EXAM
[Normal Breath Sounds] : Normal breath sounds [Normal Heart Sounds] : normal heart sounds [No Rash or Lesion] : No rash or lesion [Normal Rate and Rhythm] : normal rate and rhythm [Alert] : alert [Oriented to Person] : oriented to person [Oriented to Place] : oriented to place [Oriented to Time] : oriented to time [Calm] : calm [Tender] : nontender [None] : no anal fissures seen [Fistula] : a fistula [Excoriation] : no perianal excoriation [Multiple Sinus Tracts] : no perianal sinus tracts [Ulcer ___ cm] : no ulcers [Wart] : no warts [Pilonidal Cyst] : no pilonidal cysts [Pilonidal Sinus] : no pilonidal sinus [Pilonidal Sinus Draining] : no pilonidal sinus drainage [Tender, Swollen] : nontender, non-swollen [Normal] : was normal [JVD] : no jugular venous distention  [Purpura] : no purpura  [Petechiae] : no petechiae [Skin Ulcer] : no ulcer [Skin Induration] : no induration [de-identified] : obese abdomen, +BS [de-identified] : Right anterior skin abnormality with central hole possible external opening of the fistula. Anoscopy reveals no obvious internal pathology [de-identified] : well nourished male [de-identified] : VAHE/+ROM [de-identified] : NC/AT [de-identified] : Intact

## 2020-09-09 NOTE — ASSESSMENT
[FreeTextEntry1] : 49-year-old male with possible anal fistula. The patient will require an exam under anesthesia possible fistulotomy possible excision of lesion for excisional biopsy. Risks and benefits explained to the patient

## 2020-09-09 NOTE — REVIEW OF SYSTEMS
[Abdominal Pain] : abdominal pain [Heartburn] : heartburn [Joint Pain] : joint pain [Dizziness] : dizziness [Negative] : Psychiatric [FreeTextEntry9] : neck and back pain [FreeTextEntry5] : HTN [FreeTextEntry7] : IBS [FreeTextEntry2] : Headaches [de-identified] : polycythemia [de-identified] : Diabetes

## 2020-09-17 ENCOUNTER — RESULT REVIEW (OUTPATIENT)
Age: 50
End: 2020-09-17

## 2020-09-17 ENCOUNTER — APPOINTMENT (OUTPATIENT)
Dept: HEMATOLOGY ONCOLOGY | Facility: CLINIC | Age: 50
End: 2020-09-17

## 2020-09-17 ENCOUNTER — OUTPATIENT (OUTPATIENT)
Dept: OUTPATIENT SERVICES | Facility: HOSPITAL | Age: 50
LOS: 1 days | Discharge: HOME | End: 2020-09-17

## 2020-09-17 DIAGNOSIS — D75.1 SECONDARY POLYCYTHEMIA: ICD-10-CM

## 2020-09-17 DIAGNOSIS — Z98.890 OTHER SPECIFIED POSTPROCEDURAL STATES: Chronic | ICD-10-CM

## 2020-09-17 DIAGNOSIS — Z98.1 ARTHRODESIS STATUS: Chronic | ICD-10-CM

## 2020-09-17 LAB
BASOPHILS # BLD AUTO: 0.04 K/UL — SIGNIFICANT CHANGE UP (ref 0–0.2)
BASOPHILS NFR BLD AUTO: 0.4 % — SIGNIFICANT CHANGE UP (ref 0–2)
EOSINOPHIL # BLD AUTO: 0.03 K/UL — SIGNIFICANT CHANGE UP (ref 0–0.5)
EOSINOPHIL NFR BLD AUTO: 0.3 % — SIGNIFICANT CHANGE UP (ref 0–6)
HCT VFR BLD CALC: 41.1 % — SIGNIFICANT CHANGE UP (ref 39–50)
HGB BLD-MCNC: 13.2 G/DL — SIGNIFICANT CHANGE UP (ref 13–17)
IMM GRANULOCYTES NFR BLD AUTO: 0.4 % — SIGNIFICANT CHANGE UP (ref 0–1.5)
LYMPHOCYTES # BLD AUTO: 1.6 K/UL — SIGNIFICANT CHANGE UP (ref 1–3.3)
LYMPHOCYTES # BLD AUTO: 14.7 % — SIGNIFICANT CHANGE UP (ref 13–44)
MCHC RBC-ENTMCNC: 25 PG — LOW (ref 27–34)
MCHC RBC-ENTMCNC: 32.1 G/DL — SIGNIFICANT CHANGE UP (ref 32–36)
MCV RBC AUTO: 77.8 FL — LOW (ref 80–100)
MONOCYTES # BLD AUTO: 0.48 K/UL — SIGNIFICANT CHANGE UP (ref 0–0.9)
MONOCYTES NFR BLD AUTO: 4.4 % — SIGNIFICANT CHANGE UP (ref 2–14)
NEUTROPHILS # BLD AUTO: 8.71 K/UL — HIGH (ref 1.8–7.4)
NEUTROPHILS NFR BLD AUTO: 79.8 % — HIGH (ref 43–77)
NRBC # BLD: 0 /100 WBCS — SIGNIFICANT CHANGE UP (ref 0–0)
PLATELET # BLD AUTO: 259 K/UL — SIGNIFICANT CHANGE UP (ref 150–400)
RBC # BLD: 5.28 M/UL — SIGNIFICANT CHANGE UP (ref 4.2–5.8)
RBC # FLD: 16.8 % — HIGH (ref 10.3–14.5)
WBC # BLD: 10.9 K/UL — HIGH (ref 3.8–10.5)
WBC # FLD AUTO: 10.9 K/UL — HIGH (ref 3.8–10.5)

## 2020-09-18 ENCOUNTER — OUTPATIENT (OUTPATIENT)
Dept: OUTPATIENT SERVICES | Facility: HOSPITAL | Age: 50
LOS: 1 days | End: 2020-09-18
Payer: MEDICARE

## 2020-09-18 VITALS
OXYGEN SATURATION: 98 % | DIASTOLIC BLOOD PRESSURE: 86 MMHG | SYSTOLIC BLOOD PRESSURE: 124 MMHG | HEIGHT: 68.5 IN | HEART RATE: 69 BPM | WEIGHT: 223.11 LBS | TEMPERATURE: 99 F | RESPIRATION RATE: 18 BRPM

## 2020-09-18 DIAGNOSIS — S62.308A UNSPECIFIED FRACTURE OF OTHER METACARPAL BONE, INITIAL ENCOUNTER FOR CLOSED FRACTURE: Chronic | ICD-10-CM

## 2020-09-18 DIAGNOSIS — K60.3 ANAL FISTULA: ICD-10-CM

## 2020-09-18 DIAGNOSIS — E11.9 TYPE 2 DIABETES MELLITUS WITHOUT COMPLICATIONS: ICD-10-CM

## 2020-09-18 DIAGNOSIS — Z98.890 OTHER SPECIFIED POSTPROCEDURAL STATES: Chronic | ICD-10-CM

## 2020-09-18 DIAGNOSIS — Z98.1 ARTHRODESIS STATUS: Chronic | ICD-10-CM

## 2020-09-18 DIAGNOSIS — G72.9 MYOPATHY, UNSPECIFIED: Chronic | ICD-10-CM

## 2020-09-18 PROCEDURE — 82947 ASSAY GLUCOSE BLOOD QUANT: CPT

## 2020-09-18 PROCEDURE — G0463: CPT

## 2020-09-18 PROCEDURE — 83036 HEMOGLOBIN GLYCOSYLATED A1C: CPT

## 2020-09-18 RX ORDER — MAGNESIUM OXIDE 400 MG ORAL TABLET 241.3 MG
1 TABLET ORAL
Qty: 0 | Refills: 0 | DISCHARGE

## 2020-09-18 RX ORDER — TRAMADOL HYDROCHLORIDE 50 MG/1
1 TABLET ORAL
Qty: 0 | Refills: 0 | DISCHARGE

## 2020-09-18 RX ORDER — LIDOCAINE HCL 20 MG/ML
0.2 VIAL (ML) INJECTION ONCE
Refills: 0 | Status: DISCONTINUED | OUTPATIENT
Start: 2020-09-24 | End: 2020-10-08

## 2020-09-18 RX ORDER — SODIUM CHLORIDE 9 MG/ML
3 INJECTION INTRAMUSCULAR; INTRAVENOUS; SUBCUTANEOUS EVERY 8 HOURS
Refills: 0 | Status: DISCONTINUED | OUTPATIENT
Start: 2020-09-24 | End: 2020-10-08

## 2020-09-18 NOTE — H&P PST ADULT - NSICDXPASTMEDICALHX_GEN_ALL_CORE_FT
PAST MEDICAL HISTORY:  DM (diabetes mellitus) 2017    History of IBS     History of pulmonary embolism May 2018, treated w/6 months of Xarelto due to DVT    Hyperlipidemia     Hypertension     Migraine     Obstructive sleep apnea no CPAP    Other specified disorders of male genital organs     Polycythemia vera denervation    Vertigo

## 2020-09-18 NOTE — H&P PST ADULT - HISTORY OF PRESENT ILLNESS
49yr old male with anal fistula for 2yrs conservative tx not  working now coming in for fistulotomy. Pt hx sig for HTN  AISSATOU polycythemia diabetes repeated glucose level and HemA1c  today.    Note: covid test 9/21/20

## 2020-09-18 NOTE — H&P PST ADULT - NSICDXPROBLEM_GEN_ALL_CORE_FT
PROBLEM DIAGNOSES  Problem: Anal fistula  Assessment and Plan: coming in for fistulotomy    Problem: Type 2 diabetes mellitus  Assessment and Plan: Pt glucose was high yesterday non fasting will recheck A!C today  and repeat non fasting glucose

## 2020-09-18 NOTE — H&P PST ADULT - NSICDXPASTSURGICALHX_GEN_ALL_CORE_FT
PAST SURGICAL HISTORY:  Closed fracture of 5th metacarpal     Denervation myopathy groin 2019    H/O bilateral inguinal hernia repair 2009    H/O knee surgery left meniscus    History of lumbar spinal fusion 2008

## 2020-09-20 DIAGNOSIS — Z01.818 ENCOUNTER FOR OTHER PREPROCEDURAL EXAMINATION: ICD-10-CM

## 2020-09-21 ENCOUNTER — APPOINTMENT (OUTPATIENT)
Dept: DISASTER EMERGENCY | Facility: CLINIC | Age: 50
End: 2020-09-21

## 2020-09-21 LAB — SARS-COV-2 N GENE NPH QL NAA+PROBE: NOT DETECTED

## 2020-09-23 ENCOUNTER — TRANSCRIPTION ENCOUNTER (OUTPATIENT)
Age: 50
End: 2020-09-23

## 2020-09-24 ENCOUNTER — APPOINTMENT (OUTPATIENT)
Dept: COLORECTAL SURGERY | Facility: HOSPITAL | Age: 50
End: 2020-09-24
Payer: MEDICARE

## 2020-09-24 ENCOUNTER — RESULT REVIEW (OUTPATIENT)
Age: 50
End: 2020-09-24

## 2020-09-24 ENCOUNTER — OUTPATIENT (OUTPATIENT)
Dept: OUTPATIENT SERVICES | Facility: HOSPITAL | Age: 50
LOS: 1 days | End: 2020-09-24
Payer: MEDICARE

## 2020-09-24 VITALS — HEART RATE: 68 BPM | OXYGEN SATURATION: 99 %

## 2020-09-24 VITALS
SYSTOLIC BLOOD PRESSURE: 120 MMHG | WEIGHT: 223.11 LBS | HEART RATE: 73 BPM | HEIGHT: 68.5 IN | DIASTOLIC BLOOD PRESSURE: 81 MMHG | TEMPERATURE: 98 F | RESPIRATION RATE: 16 BRPM | OXYGEN SATURATION: 98 %

## 2020-09-24 DIAGNOSIS — Z98.890 OTHER SPECIFIED POSTPROCEDURAL STATES: Chronic | ICD-10-CM

## 2020-09-24 DIAGNOSIS — G72.9 MYOPATHY, UNSPECIFIED: Chronic | ICD-10-CM

## 2020-09-24 DIAGNOSIS — S62.308A UNSPECIFIED FRACTURE OF OTHER METACARPAL BONE, INITIAL ENCOUNTER FOR CLOSED FRACTURE: Chronic | ICD-10-CM

## 2020-09-24 DIAGNOSIS — K60.3 ANAL FISTULA: ICD-10-CM

## 2020-09-24 DIAGNOSIS — Z98.1 ARTHRODESIS STATUS: Chronic | ICD-10-CM

## 2020-09-24 DIAGNOSIS — Z01.818 ENCOUNTER FOR OTHER PREPROCEDURAL EXAMINATION: ICD-10-CM

## 2020-09-24 LAB — GLUCOSE BLDC GLUCOMTR-MCNC: 144 MG/DL — HIGH (ref 70–99)

## 2020-09-24 PROCEDURE — 88304 TISSUE EXAM BY PATHOLOGIST: CPT

## 2020-09-24 PROCEDURE — 11402 EXC TR-EXT B9+MARG 1.1-2 CM: CPT

## 2020-09-24 PROCEDURE — C1889: CPT

## 2020-09-24 PROCEDURE — 88304 TISSUE EXAM BY PATHOLOGIST: CPT | Mod: 26

## 2020-09-24 PROCEDURE — 82962 GLUCOSE BLOOD TEST: CPT

## 2020-09-24 RX ORDER — ATORVASTATIN CALCIUM 80 MG/1
1 TABLET, FILM COATED ORAL
Qty: 0 | Refills: 0 | DISCHARGE

## 2020-09-24 RX ORDER — SODIUM CHLORIDE 9 MG/ML
1000 INJECTION, SOLUTION INTRAVENOUS
Refills: 0 | Status: DISCONTINUED | OUTPATIENT
Start: 2020-09-24 | End: 2020-10-08

## 2020-09-24 RX ORDER — TAMSULOSIN HYDROCHLORIDE 0.4 MG/1
1 CAPSULE ORAL
Qty: 0 | Refills: 0 | DISCHARGE

## 2020-09-24 RX ORDER — HYDROMORPHONE HYDROCHLORIDE 2 MG/ML
0.25 INJECTION INTRAMUSCULAR; INTRAVENOUS; SUBCUTANEOUS
Refills: 0 | Status: DISCONTINUED | OUTPATIENT
Start: 2020-09-24 | End: 2020-09-24

## 2020-09-24 RX ORDER — FINASTERIDE 5 MG/1
1 TABLET, FILM COATED ORAL
Qty: 0 | Refills: 0 | DISCHARGE

## 2020-09-24 RX ORDER — DEXLANSOPRAZOLE 30 MG/1
1 CAPSULE, DELAYED RELEASE ORAL
Qty: 0 | Refills: 0 | DISCHARGE

## 2020-09-24 RX ORDER — OXYCODONE HYDROCHLORIDE 5 MG/1
5 TABLET ORAL ONCE
Refills: 0 | Status: DISCONTINUED | OUTPATIENT
Start: 2020-09-24 | End: 2020-09-24

## 2020-09-24 RX ORDER — ONDANSETRON 8 MG/1
4 TABLET, FILM COATED ORAL ONCE
Refills: 0 | Status: DISCONTINUED | OUTPATIENT
Start: 2020-09-24 | End: 2020-10-08

## 2020-09-24 RX ORDER — NEBIVOLOL HYDROCHLORIDE 5 MG/1
1 TABLET ORAL
Qty: 0 | Refills: 0 | DISCHARGE

## 2020-09-24 RX ORDER — LOSARTAN POTASSIUM 100 MG/1
1 TABLET, FILM COATED ORAL
Qty: 0 | Refills: 0 | DISCHARGE

## 2020-09-24 RX ORDER — BACLOFEN 100 %
1 POWDER (GRAM) MISCELLANEOUS
Qty: 0 | Refills: 0 | DISCHARGE

## 2020-09-24 RX ORDER — OXYCODONE AND ACETAMINOPHEN 5; 325 MG/1; MG/1
1 TABLET ORAL
Qty: 10 | Refills: 0
Start: 2020-09-24

## 2020-09-24 RX ORDER — EMPAGLIFLOZIN 10 MG/1
1 TABLET, FILM COATED ORAL
Qty: 0 | Refills: 0 | DISCHARGE

## 2020-09-24 NOTE — ASU DISCHARGE PLAN (ADULT/PEDIATRIC) - PAIN MANAGEMENT
Prescriptions electronically submitted to pharmacy from [facility]/Take over the counter pain medication Prescriptions electronically submitted to pharmacy from [place]/Prescription called to pharmacy/Take over the counter pain medication

## 2020-09-24 NOTE — ASU PATIENT PROFILE, ADULT - PSH
Closed fracture of 5th metacarpal    Denervation myopathy  groin 2019  H/O bilateral inguinal hernia repair  2009  H/O knee surgery  left meniscus  History of lumbar spinal fusion  2008

## 2020-09-24 NOTE — PRE-ANESTHESIA EVALUATION ADULT - NSANTHAIRWAYFT_ENT_ALL_CORE
decr rom 2 1/2 fb rt sided neck pain localised wjen turning nect to the left states surgery indicated

## 2020-09-24 NOTE — ASU DISCHARGE PLAN (ADULT/PEDIATRIC) - CARE PROVIDER_API CALL
Juan C Elder  COLON/RECTAL SURGERY  [address], [address]  [place] [address]  Phone: [phone]  Fax: [phone]  Follow Up Time:

## 2020-09-24 NOTE — ASU PATIENT PROFILE, ADULT - PMH
DM (diabetes mellitus)  2017  History of IBS    History of pulmonary embolism  May 2018, treated w/6 months of Xarelto due to DVT  Hyperlipidemia    Hypertension    Migraine    Obstructive sleep apnea  no CPAP  Other specified disorders of male genital organs    Polycythemia vera  denervation  Vertigo

## 2020-09-24 NOTE — ASU DISCHARGE PLAN (ADULT/PEDIATRIC) - ASU DC SPECIAL INSTRUCTIONSFT
Please see printed hand out for care instructions post op.    Please follow up with Dr. Elder in 3 weeks of the day of your Surgery in his outpatient office by calling to schedule an appointment.    Please take Percocet for pain as needed, 1 tab every 6 hours for a maximum of 4 tabs per day. Please use over the counter tylenol and ibuprofen as needed for pain as needed afterwards.

## 2020-09-25 PROBLEM — Z86.711 PERSONAL HISTORY OF PULMONARY EMBOLISM: Chronic | Status: ACTIVE | Noted: 2019-04-01

## 2020-09-25 PROBLEM — E11.9 TYPE 2 DIABETES MELLITUS WITHOUT COMPLICATIONS: Chronic | Status: ACTIVE | Noted: 2018-05-18

## 2020-09-25 PROBLEM — Z87.19 PERSONAL HISTORY OF OTHER DISEASES OF THE DIGESTIVE SYSTEM: Chronic | Status: ACTIVE | Noted: 2020-09-18

## 2020-10-05 ENCOUNTER — APPOINTMENT (OUTPATIENT)
Dept: COLORECTAL SURGERY | Facility: CLINIC | Age: 50
End: 2020-10-05
Payer: MEDICARE

## 2020-10-05 VITALS
OXYGEN SATURATION: 100 % | TEMPERATURE: 98.2 F | DIASTOLIC BLOOD PRESSURE: 75 MMHG | HEART RATE: 72 BPM | SYSTOLIC BLOOD PRESSURE: 110 MMHG

## 2020-10-05 PROCEDURE — 99212 OFFICE O/P EST SF 10 MIN: CPT

## 2020-10-05 NOTE — ASSESSMENT
[FreeTextEntry1] : 50-year-old male status post excision of perianal lesion likely scar tissue. Underlying fistula was not found during the procedure, however it cannot be completely ruled out. If the wound fails to heal properly or has ongoing drainage after one month we will consider ordering an MRI.

## 2020-10-05 NOTE — HISTORY OF PRESENT ILLNESS
[FreeTextEntry1] : 50-year-old male here for his first postoperative visit after undergoing excision of perianal lesion

## 2020-10-12 NOTE — ED ADULT NURSE NOTE - TEMPLATE LIST FOR HEAD TO TOE ASSESSMENT
Called and spoke with wife.  We discussed his current admission  Discussed that the findings on US not concerning at this point.   Discussed previous CT scan years ago.  We will recheck everything once he gets stable and out of the hospital.     General

## 2020-10-19 ENCOUNTER — APPOINTMENT (OUTPATIENT)
Dept: COLORECTAL SURGERY | Facility: CLINIC | Age: 50
End: 2020-10-19
Payer: MEDICARE

## 2020-10-19 VITALS — TEMPERATURE: 98.3 F

## 2020-10-19 PROCEDURE — 99212 OFFICE O/P EST SF 10 MIN: CPT

## 2020-11-23 ENCOUNTER — APPOINTMENT (OUTPATIENT)
Dept: COLORECTAL SURGERY | Facility: CLINIC | Age: 50
End: 2020-11-23
Payer: MEDICARE

## 2020-11-23 VITALS — TEMPERATURE: 97.6 F

## 2020-11-23 PROCEDURE — 99072 ADDL SUPL MATRL&STAF TM PHE: CPT

## 2020-11-23 PROCEDURE — 99213 OFFICE O/P EST LOW 20 MIN: CPT

## 2021-01-08 ENCOUNTER — APPOINTMENT (OUTPATIENT)
Dept: ENDOCRINOLOGY | Facility: CLINIC | Age: 51
End: 2021-01-08
Payer: MEDICARE

## 2021-01-08 VITALS
HEIGHT: 69 IN | OXYGEN SATURATION: 97 % | BODY MASS INDEX: 31.4 KG/M2 | WEIGHT: 212 LBS | SYSTOLIC BLOOD PRESSURE: 120 MMHG | DIASTOLIC BLOOD PRESSURE: 70 MMHG | HEART RATE: 79 BPM | TEMPERATURE: 98.3 F

## 2021-01-08 VITALS
DIASTOLIC BLOOD PRESSURE: 70 MMHG | HEIGHT: 69 IN | HEART RATE: 79 BPM | BODY MASS INDEX: 31.4 KG/M2 | SYSTOLIC BLOOD PRESSURE: 120 MMHG | OXYGEN SATURATION: 97 % | WEIGHT: 212 LBS | TEMPERATURE: 98.3 F

## 2021-01-08 LAB
GLUCOSE BLDC GLUCOMTR-MCNC: 161
HBA1C MFR BLD HPLC: 6.9

## 2021-01-08 PROCEDURE — 83036 HEMOGLOBIN GLYCOSYLATED A1C: CPT | Mod: QW

## 2021-01-08 PROCEDURE — 82962 GLUCOSE BLOOD TEST: CPT

## 2021-01-08 PROCEDURE — 99214 OFFICE O/P EST MOD 30 MIN: CPT | Mod: 25

## 2021-01-08 NOTE — REVIEW OF SYSTEMS
[Recent Weight Gain (___ Lbs)] : no recent weight gain [Recent Weight Loss (___ Lbs)] : no recent weight loss [Heartburn] : heartburn [Polyuria] : no polyuria [Back Pain] : back pain [Pain/Numbness of Digits] : pain/numbness of digits [Polydipsia] : no polydipsia [Swelling] : no swelling [Negative] : Respiratory [FreeTextEntry4] : posterior neck pain

## 2021-01-08 NOTE — ASSESSMENT
[FreeTextEntry1] : 50 y.o. male with h/o Type 2 DM, HTN and hyperlipidemia.\par \par 1. Type 2 DM- Good control with Hba1c of 6.9% today. Encouraged carbohydrate consistent diet and exercise. Will continue glipizide ER 10 mg daily and Jardiance 25 mg daily. Reviewed risks and benefits. Encouraged increase in SMBG and forwarding blood glucose log. Stable CMP and urine microalb/cr ratio in May 2020. \par \par 2. HTN- BP is at goal. Will continue current medications including Losartan\par \par 3. Hyperlipidemia- LDL is at goal. Will continue statin. \par \par Follow up in 3 to 4 months\par Follow up with opthalmology, podiatry and vascular [Carbohydrate Consistent Diet] : carbohydrate consistent diet [Diabetes Foot Care] : diabetes foot care [Long Term Vascular Complications] : long term vascular complications of diabetes [Importance of Diet and Exercise] : importance of diet and exercise to improve glycemic control, achieve weight loss and improve cardiovascular health

## 2021-01-08 NOTE — HISTORY OF PRESENT ILLNESS
[FreeTextEntry1] : 50 y.o. male with h/o Type 2 DM diagnosed in 2/2017, HTN and hyperlipidemia here for follow up visit. Had perianal mass removed in 9/2020 and doing healed. Reports GERD and diarrhea which increased in the first 2 days after Trulicity administration and therefore stopped it in March 2019. Also has Barretts. Intolerant of Metformin. Taking glipizide ER 10 mg daily and Jardiance 25 mg daily. Not monitoring FS at home.  Diet is better now. UTD with ophthalmology (6/25/2020) and no retinopathy. Saw podiatry in 2020. No proteinuria. Feeling good. No polyuria and no polydipsia. Dealing with back and neck pain. Went to pain management for lumbar nerve blocks and had radiofrequency. Most likely needs c spine surgery but not interested yet. Had left LE vein procedure in March 2020 and not happy. \par \par For breakfast:  BLT\par For lunch: pizza\par For dinner: salad, chicken cutlet and asparagus, corn\par Crystal light, no sodas or juices\par Cheating with nuts or Chex mix or pretzels or potato chips\par

## 2021-01-08 NOTE — PHYSICAL EXAM
[Alert] : alert [No Acute Distress] : no acute distress [Normal Sclera/Conjunctiva] : normal sclera/conjunctiva [EOMI] : extra ocular movement intact [No LAD] : no lymphadenopathy [Thyroid Not Enlarged] : the thyroid was not enlarged [No Thyroid Nodules] : no palpable thyroid nodules [No Respiratory Distress] : no respiratory distress [Clear to Auscultation] : lungs were clear to auscultation bilaterally [Normal S1, S2] : normal S1 and S2 [Regular Rhythm] : with a regular rhythm [No Edema] : no peripheral edema [Pedal Pulses Normal] : the pedal pulses are present [Normal Bowel Sounds] : normal bowel sounds [Not Tender] : non-tender [Not Distended] : not distended [Soft] : abdomen soft [Normal Anterior Cervical Nodes] : no anterior cervical lymphadenopathy [No Clubbing, Cyanosis] : no clubbing  or cyanosis of the fingernails [No Rash] : no rash [Normal] : normal [Normal Reflexes] : deep tendon reflexes were 2+ and symmetric [Normal Affect] : the affect was normal [Normal Mood] : the mood was normal [de-identified] : umbilical hernia noted [de-identified] : Declined foot exam today

## 2021-02-17 ENCOUNTER — APPOINTMENT (OUTPATIENT)
Dept: COLORECTAL SURGERY | Facility: CLINIC | Age: 51
End: 2021-02-17
Payer: MEDICARE

## 2021-02-17 DIAGNOSIS — K64.8 OTHER HEMORRHOIDS: ICD-10-CM

## 2021-02-17 PROCEDURE — 46600 DIAGNOSTIC ANOSCOPY SPX: CPT

## 2021-02-17 PROCEDURE — 99212 OFFICE O/P EST SF 10 MIN: CPT | Mod: 24,25

## 2021-02-17 RX ORDER — HYDROCORTISONE 2.5% 25 MG/G
2.5 CREAM TOPICAL
Qty: 1 | Refills: 3 | Status: ACTIVE | COMMUNITY
Start: 2021-02-17 | End: 1900-01-01

## 2021-02-17 NOTE — HISTORY OF PRESENT ILLNESS
[FreeTextEntry1] : 50-year-old male with history of perianal mass excision presents with anal irritation and swelling. He denies bleeding states that it itches on occasion. He occasionally feels tissue protruding from his anus.

## 2021-02-17 NOTE — PHYSICAL EXAM
[de-identified] : Benign [de-identified] : Previous surgical site well healed on anoscopy he has swollen internal hemorrhoids

## 2021-04-06 ENCOUNTER — APPOINTMENT (OUTPATIENT)
Dept: VASCULAR SURGERY | Facility: CLINIC | Age: 51
End: 2021-04-06

## 2021-04-20 ENCOUNTER — APPOINTMENT (OUTPATIENT)
Dept: VASCULAR SURGERY | Facility: CLINIC | Age: 51
End: 2021-04-20
Payer: MEDICARE

## 2021-04-20 VITALS
WEIGHT: 211 LBS | TEMPERATURE: 97.2 F | BODY MASS INDEX: 31.25 KG/M2 | SYSTOLIC BLOOD PRESSURE: 132 MMHG | HEART RATE: 70 BPM | HEIGHT: 69 IN | DIASTOLIC BLOOD PRESSURE: 91 MMHG

## 2021-04-20 DIAGNOSIS — M79.89 OTHER SPECIFIED SOFT TISSUE DISORDERS: ICD-10-CM

## 2021-04-20 PROCEDURE — 99211 OFF/OP EST MAY X REQ PHY/QHP: CPT

## 2021-04-20 NOTE — HISTORY OF PRESENT ILLNESS
[de-identified] : pain and swelling in the left leg [FreeTextEntry1] : A 50 year old man who underwent left RFA at an outside facility and continues to complain of pain and swelling of the left leg. He also continues to complain of pain while standing longer period of time.

## 2021-04-26 NOTE — ASU PATIENT PROFILE, ADULT - ALCOHOL USE HISTORY SINGLE SELECT
ANTICOAGULATION     Shira Rodriguez is overdue for INR check.      Left message for patient to call and schedule lab appointment as soon as possible. If returning call, please schedule.     YUNIER DAVIDSON RN      
yes...

## 2021-05-05 ENCOUNTER — RX RENEWAL (OUTPATIENT)
Age: 51
End: 2021-05-05

## 2021-05-07 RX ORDER — SODIUM CHLORIDE 9 G/ML
0.9 INJECTION, SOLUTION INTRAVENOUS
Qty: 1 | Refills: 0 | Status: ACTIVE | COMMUNITY
Start: 2021-05-07 | End: 1900-01-01

## 2021-05-07 RX ORDER — LIDOCAINE HYDROCHLORIDE 10 MG/ML
1 INJECTION, SOLUTION INFILTRATION; PERINEURAL
Qty: 1 | Refills: 0 | Status: ACTIVE | COMMUNITY
Start: 2021-05-07 | End: 1900-01-01

## 2021-05-07 RX ORDER — SODIUM BICARBONATE 84 MG/ML
8.4 INJECTION, SOLUTION INTRAVENOUS
Qty: 1 | Refills: 0 | Status: ACTIVE | COMMUNITY
Start: 2021-05-07 | End: 1900-01-01

## 2021-05-10 ENCOUNTER — LABORATORY RESULT (OUTPATIENT)
Age: 51
End: 2021-05-10

## 2021-05-10 ENCOUNTER — APPOINTMENT (OUTPATIENT)
Dept: COLORECTAL SURGERY | Facility: CLINIC | Age: 51
End: 2021-05-10
Payer: MEDICARE

## 2021-05-10 ENCOUNTER — APPOINTMENT (OUTPATIENT)
Dept: VASCULAR SURGERY | Facility: CLINIC | Age: 51
End: 2021-05-10

## 2021-05-10 DIAGNOSIS — Z20.822 ENCOUNTER FOR PREPROCEDURAL LABORATORY EXAMINATION: ICD-10-CM

## 2021-05-10 DIAGNOSIS — Z01.812 ENCOUNTER FOR PREPROCEDURAL LABORATORY EXAMINATION: ICD-10-CM

## 2021-05-10 PROCEDURE — 99212 OFFICE O/P EST SF 10 MIN: CPT

## 2021-05-10 NOTE — PHYSICAL EXAM
[de-identified] : Small anterior fissure located on scar. No evidence of fluctuant erythema or induration.

## 2021-05-10 NOTE — HISTORY OF PRESENT ILLNESS
[FreeTextEntry1] : 48-year-old male here for checkup as he noted some new anterior sharp pain with his bowel movements. He denies fever.

## 2021-05-11 PROBLEM — Z01.812 ENCOUNTER FOR PREPROCEDURE SCREENING LABORATORY TESTING FOR COVID-19: Status: ACTIVE | Noted: 2021-05-11

## 2021-05-12 PROBLEM — I83.892 VARICOSE VEINS OF LEFT LOWER EXTREMITY WITH OTHER COMPLICATIONS: Status: ACTIVE | Noted: 2021-05-12

## 2021-05-13 ENCOUNTER — APPOINTMENT (OUTPATIENT)
Dept: VASCULAR SURGERY | Facility: CLINIC | Age: 51
End: 2021-05-13

## 2021-05-13 DIAGNOSIS — I83.892 VARICOSE VEINS OF LEFT LOWER EXTREMITY WITH OTHER COMPLICATIONS: ICD-10-CM

## 2021-07-20 LAB
ALBUMIN SERPL ELPH-MCNC: 4.6 G/DL
ALP BLD-CCNC: 56 U/L
ALT SERPL-CCNC: 45 U/L
ANION GAP SERPL CALC-SCNC: 15 MMOL/L
AST SERPL-CCNC: 25 U/L
BILIRUB SERPL-MCNC: 0.3 MG/DL
BUN SERPL-MCNC: 13 MG/DL
CALCIUM SERPL-MCNC: 9.1 MG/DL
CHLORIDE SERPL-SCNC: 100 MMOL/L
CO2 SERPL-SCNC: 21 MMOL/L
CREAT SERPL-MCNC: 0.9 MG/DL
FERRITIN SERPL-MCNC: 16 NG/ML
GLUCOSE SERPL-MCNC: 329 MG/DL
IRON SATN MFR SERPL: 9 %
IRON SERPL-MCNC: 28 UG/DL
POTASSIUM SERPL-SCNC: 4.3 MMOL/L
PROT SERPL-MCNC: 6.6 G/DL
SODIUM SERPL-SCNC: 136 MMOL/L
TIBC SERPL-MCNC: 307 UG/DL
TRANSFERRIN SERPL-MCNC: 240 MG/DL
UIBC SERPL-MCNC: 279 UG/DL

## 2021-08-04 NOTE — ED CDU PROVIDER SUBSEQUENT DAY NOTE - PSYCHIATRIC, MLM
Detail Level: Zone Alert and oriented to person, place, time/situation. normal mood and affect. no apparent risk to self or others.

## 2021-09-03 ENCOUNTER — APPOINTMENT (OUTPATIENT)
Dept: ENDOCRINOLOGY | Facility: CLINIC | Age: 51
End: 2021-09-03
Payer: MEDICARE

## 2021-09-03 VITALS
TEMPERATURE: 97.2 F | WEIGHT: 205 LBS | SYSTOLIC BLOOD PRESSURE: 140 MMHG | OXYGEN SATURATION: 97 % | BODY MASS INDEX: 30.27 KG/M2 | DIASTOLIC BLOOD PRESSURE: 84 MMHG | HEART RATE: 90 BPM

## 2021-09-03 VITALS — SYSTOLIC BLOOD PRESSURE: 120 MMHG | DIASTOLIC BLOOD PRESSURE: 70 MMHG

## 2021-09-03 DIAGNOSIS — E11.65 TYPE 2 DIABETES MELLITUS WITH HYPERGLYCEMIA: ICD-10-CM

## 2021-09-03 LAB — HBA1C MFR BLD HPLC: 7.4

## 2021-09-03 PROCEDURE — 83036 HEMOGLOBIN GLYCOSYLATED A1C: CPT | Mod: QW

## 2021-09-03 PROCEDURE — 99214 OFFICE O/P EST MOD 30 MIN: CPT | Mod: 25

## 2021-09-03 RX ORDER — GLIPIZIDE 10 MG/1
10 TABLET, FILM COATED, EXTENDED RELEASE ORAL
Qty: 90 | Refills: 3 | Status: ACTIVE | COMMUNITY
Start: 2019-03-20 | End: 1900-01-01

## 2021-09-03 RX ORDER — EMPAGLIFLOZIN 25 MG/1
25 TABLET, FILM COATED ORAL DAILY
Qty: 90 | Refills: 3 | Status: ACTIVE | COMMUNITY
Start: 2019-11-20 | End: 1900-01-01

## 2021-09-04 NOTE — ASSESSMENT
[Carbohydrate Consistent Diet] : carbohydrate consistent diet [Diabetes Foot Care] : diabetes foot care [Importance of Diet and Exercise] : importance of diet and exercise to improve glycemic control, achieve weight loss and improve cardiovascular health [Long Term Vascular Complications] : long term vascular complications of diabetes [FreeTextEntry1] : 50 y.o. male with h/o Type 2 DM, HTN and hyperlipidemia.\par \par 1. Type 2 DM- Fair control with Hba1c of 7.4% today. Encouraged carbohydrate consistent diet and exercise. Will continue glipizide ER 10 mg daily and Jardiance 25 mg daily. Reviewed risks and benefits. Encouraged increase in SMBG and forwarding blood glucose log. Stable CMP and urine microalb/cr ratio in May 2020 but will repeat next month at his PCP visit. \par \par 2. HTN- BP is at goal. Will continue current medications including Losartan\par \par 3. Hyperlipidemia- LDL is at goal. Will continue statin. \par \par Follow up in 3 to 4 months\par Follow up with opthalmology and podiatry

## 2021-09-04 NOTE — HISTORY OF PRESENT ILLNESS
[FreeTextEntry1] : 50 y.o. male with h/o Type 2 DM diagnosed in 2/2017, HTN and hyperlipidemia here for follow up visit. Had perianal mass removed in 9/2020 and healed. Reports GERD and diarrhea which increased in the first 2 days after Trulicity administration and therefore stopped it in March 2019. Also has Barretts. Intolerant of Metformin. Taking glipizide ER 10 mg daily and Jardiance 25 mg daily. Monitoring FS at home but no meter or log book today.  Diet is worse now. Past due for ophthalmology (6/25/2020) and no h/o retinopathy. Saw podiatry in 2020. No proteinuria. Feeling good. No polyuria and no polydipsia. No exercise. Dealing with back and neck pain. Went to pain management for lumbar nerve blocks and had radiofrequency. Most likely needs c spine surgery but not scheduled yet. Had left LE vein procedure in March 2020 and not happy. \par \par For breakfast: eggs with sausage\par For lunch/dinner: pizza\par For snacks: chocolate, Chex mix and crackers\par Crystal light, no sodas or juices\par

## 2021-09-04 NOTE — PHYSICAL EXAM
[Alert] : alert [No Acute Distress] : no acute distress [Normal Sclera/Conjunctiva] : normal sclera/conjunctiva [EOMI] : extra ocular movement intact [No LAD] : no lymphadenopathy [Thyroid Not Enlarged] : the thyroid was not enlarged [No Thyroid Nodules] : no palpable thyroid nodules [No Respiratory Distress] : no respiratory distress [Clear to Auscultation] : lungs were clear to auscultation bilaterally [Normal S1, S2] : normal S1 and S2 [Regular Rhythm] : with a regular rhythm [No Edema] : no peripheral edema [Pedal Pulses Normal] : the pedal pulses are present [Normal Bowel Sounds] : normal bowel sounds [Not Tender] : non-tender [Not Distended] : not distended [Soft] : abdomen soft [Normal Anterior Cervical Nodes] : no anterior cervical lymphadenopathy [No Clubbing, Cyanosis] : no clubbing  or cyanosis of the fingernails [No Rash] : no rash [Normal Reflexes] : deep tendon reflexes were 2+ and symmetric [Normal Affect] : the affect was normal [Normal Mood] : the mood was normal [Right Foot Was Examined] : right foot ~C was examined [Left Foot Was Examined] : left foot ~C was examined [Normal] : normal [2+] : 2+ in the dorsalis pedis [Diminished Throughout Both Feet] : diminished tactile sensation with monofilament testing throughout both feet

## 2021-09-04 NOTE — REVIEW OF SYSTEMS
[Recent Weight Loss (___ Lbs)] : recent [unfilled] ~Ulb weight loss [Heartburn] : heartburn [Back Pain] : back pain [Pain/Numbness of Digits] : pain/numbness of digits [Negative] : Respiratory [Recent Weight Gain (___ Lbs)] : no recent weight gain [Polyuria] : no polyuria [Polydipsia] : no polydipsia [Swelling] : no swelling [FreeTextEntry4] : posterior neck pain

## 2021-12-01 ENCOUNTER — NON-APPOINTMENT (OUTPATIENT)
Age: 51
End: 2021-12-01

## 2021-12-01 ENCOUNTER — APPOINTMENT (OUTPATIENT)
Dept: OPHTHALMOLOGY | Facility: CLINIC | Age: 51
End: 2021-12-01
Payer: MEDICARE

## 2021-12-01 ENCOUNTER — APPOINTMENT (OUTPATIENT)
Dept: COLORECTAL SURGERY | Facility: CLINIC | Age: 51
End: 2021-12-01
Payer: MEDICARE

## 2021-12-01 DIAGNOSIS — K60.2 ANAL FISSURE, UNSPECIFIED: ICD-10-CM

## 2021-12-01 PROCEDURE — 99214 OFFICE O/P EST MOD 30 MIN: CPT

## 2021-12-01 PROCEDURE — 92014 COMPRE OPH EXAM EST PT 1/>: CPT

## 2021-12-01 NOTE — ASSESSMENT
[FreeTextEntry1] : 51-year-old male with a change of bowel habits. He refuses exam in the office setting.

## 2021-12-01 NOTE — HISTORY OF PRESENT ILLNESS
[FreeTextEntry1] : 51-year-old male with a history of anal fissure and gluteal mass excision presents for change of bowel habits. He does suffer from chronic pain from a spinal injury and takes baclofen and other pain medication.  He feels that he is having an incomplete evacuation. He has no pain. He will have to have multiple clustered bowel movements in succession.

## 2021-12-01 NOTE — PHYSICAL EXAM
[Patient Refused] : exam was refused by the patient [de-identified] : No external evidence of disease

## 2021-12-03 ENCOUNTER — APPOINTMENT (OUTPATIENT)
Dept: ENDOCRINOLOGY | Facility: CLINIC | Age: 51
End: 2021-12-03

## 2022-02-10 ENCOUNTER — APPOINTMENT (OUTPATIENT)
Dept: COLORECTAL SURGERY | Facility: CLINIC | Age: 52
End: 2022-02-10

## 2022-02-21 NOTE — ED CDU PROVIDER DISPOSITION NOTE - NS ED MD EM SUBS DAY SELECT 1
"Chief Complaint   Patient presents with     Establish Care     Cough     x 1 week        Initial There were no vitals taken for this visit. Estimated body mass index is 31.31 kg/m  as calculated from the following:    Height as of 1/7/22: 1.753 m (5' 9\").    Weight as of 1/7/22: 96.2 kg (212 lb).  Medication Reconciliation: complete  Demetrice Banegas LPN  "
25388 - Disposition Day Code

## 2022-03-04 ENCOUNTER — APPOINTMENT (OUTPATIENT)
Dept: ENDOCRINOLOGY | Facility: CLINIC | Age: 52
End: 2022-03-04
Payer: MEDICARE

## 2022-03-04 VITALS
SYSTOLIC BLOOD PRESSURE: 120 MMHG | DIASTOLIC BLOOD PRESSURE: 80 MMHG | TEMPERATURE: 97 F | HEIGHT: 69 IN | WEIGHT: 214 LBS | HEART RATE: 71 BPM | BODY MASS INDEX: 31.7 KG/M2 | OXYGEN SATURATION: 97 %

## 2022-03-04 DIAGNOSIS — I10 ESSENTIAL (PRIMARY) HYPERTENSION: ICD-10-CM

## 2022-03-04 DIAGNOSIS — E78.5 HYPERLIPIDEMIA, UNSPECIFIED: ICD-10-CM

## 2022-03-04 DIAGNOSIS — E55.9 VITAMIN D DEFICIENCY, UNSPECIFIED: ICD-10-CM

## 2022-03-04 DIAGNOSIS — E11.9 TYPE 2 DIABETES MELLITUS W/OUT COMPLICATIONS: ICD-10-CM

## 2022-03-04 LAB
GLUCOSE BLDC GLUCOMTR-MCNC: 125
HBA1C MFR BLD HPLC: 6.8

## 2022-03-04 PROCEDURE — 83036 HEMOGLOBIN GLYCOSYLATED A1C: CPT | Mod: QW

## 2022-03-04 PROCEDURE — 99214 OFFICE O/P EST MOD 30 MIN: CPT | Mod: 25

## 2022-03-04 PROCEDURE — 82962 GLUCOSE BLOOD TEST: CPT

## 2022-03-04 RX ORDER — ERGOCALCIFEROL 1.25 MG/1
1.25 MG CAPSULE, LIQUID FILLED ORAL
Qty: 4 | Refills: 5 | Status: ACTIVE | COMMUNITY
Start: 2022-03-04

## 2022-03-04 RX ORDER — TRAMADOL HCL 100 MG
100 TABLET, EXTENDED RELEASE 24 HR ORAL
Refills: 0 | Status: DISCONTINUED | COMMUNITY
End: 2022-03-04

## 2022-03-04 RX ORDER — FINASTERIDE 5 MG/1
5 TABLET, FILM COATED ORAL DAILY
Refills: 0 | Status: ACTIVE | COMMUNITY
Start: 2022-03-04

## 2022-03-05 NOTE — ASSESSMENT
[Carbohydrate Consistent Diet] : carbohydrate consistent diet [Diabetes Foot Care] : diabetes foot care [Long Term Vascular Complications] : long term vascular complications of diabetes [Importance of Diet and Exercise] : importance of diet and exercise to improve glycemic control, achieve weight loss and improve cardiovascular health [FreeTextEntry1] : 51 y.o. male with h/o Type 2 DM, HTN, hyperlipidemia and vitamin D def.\par \par 1. Type 2 DM- Good control with Hba1c of 6.8% today. Encouraged a carbohydrate consistent diet and exercise as tolerated. Will continue glipizide ER 10 mg daily and Jardiance 25 mg daily. Reviewed risks and benefits. Stable CMP and will check urine microalb/cr ratio at next visit. \par \par 2. HTN- BP is at goal. Will continue current medications including Losartan\par \par 3. Hyperlipidemia- LDL is at goal. Will continue statin. \par \par 4. Vitamin D def- Will continue vitamin D 50,000 IU weekly and will check 25 vitamin D level at next visit. \par \par Follow up in 3 to 4 months

## 2022-03-05 NOTE — HISTORY OF PRESENT ILLNESS
[FreeTextEntry1] : 51 y.o. male with h/o Type 2 DM diagnosed in 2/2017, HTN and hyperlipidemia here for follow up visit. Had perianal mass removed in 9/2020 and healed. Reports GERD and diarrhea which increased in the first 2 days after Trulicity administration and therefore stopped it in March 2019. Also has Barretts. Intolerant of Metformin. Taking glipizide ER 10 mg daily and Jardiance 25 mg daily. Monitoring FS at home at home and are 100 to 110s.  Diet is better now. UTD with ophthalmology (12/2021) and no h/o retinopathy. Saw podiatry today and now has new shoes. No proteinuria. Feeling good. No polyuria and no polydipsia. No exercise. Dealing with back and neck pain. Went to pain management for lumbar nerve blocks and had radiofrequency. Most likely needs c spine surgery but not scheduled yet. Had left LE vein procedure in March 2020 and not happy. Needs umbilical hernia repair on 3/24/22 in [place] and eventually needs right knee surgery. \par \par Diet:\par For breakfast: eggs with sausage or Cereal\par For lunch/dinner: pizza, salad, chicken cutlet\par For snacks:  Chex mix and crackers\par Crystal light, no sodas or juices\par \par In regards to vitamin D def, taking vitamin D 50,000 Iu weekly.

## 2022-03-05 NOTE — REVIEW OF SYSTEMS
[Diarrhea] : diarrhea [Heartburn] : heartburn [Back Pain] : back pain [Pain/Numbness of Digits] : pain/numbness of digits [Negative] : Respiratory [Recent Weight Gain (___ Lbs)] : no recent weight gain [Recent Weight Loss (___ Lbs)] : no recent weight loss [Polyuria] : no polyuria [Polydipsia] : no polydipsia [Swelling] : no swelling [All other systems negative] : All other systems negative [FreeTextEntry4] : posterior neck pain [FreeTextEntry7] : hiatal hernia

## 2022-03-05 NOTE — DATA REVIEWED
[FreeTextEntry1] : Labs\par 11/2/21\par chol 158 LDL 85 HDL 45 tri 186\par glucose 155\par BUN/cr 11.83\par calcium 9.2\par Hba1c 7.2%\par HH 11.2/39.3\par UA- protein negative\par \par \par \par \par 10/16/19\par HBa1c 7.9%\par LDL 84 tri 203 chol 148 HDL 35\par glucose 199\par BUN/cr 13/0.83\par AST 52 ALT 68\par TSH 2.16 Free T4 0.9\par

## 2022-05-11 NOTE — ED CDU PROVIDER SUBSEQUENT DAY NOTE - EYES NEGATIVE STATEMENT, MLM
[FreeTextEntry1] : Impression\par \par Recent mild diverticulitis\par \par Doing well\par \par Mild constipation\par \par Follow a good healthy high-fiber diet, weight loss and exercise\par \par GERD resolved with above\par \par Gallbladder polyps followed by surgeon\par \par Suggest\par \par Patient will follow up with the surgeon\par \par He will schedule colonoscopy with me when recovered from laparoscopic cholecystectomy\par \par He should be on a high-fiber diet\par \par He is to try Citrucel or MiraLAX\par \par Continue healthy living, weight loss, exercise, high-fiber diet\par \par Call or return sooner as needed\par \par Suprep\par \par Risks/benefits:\par The procedure, the risks and benefits and alternatives have been reviewed in great detail with the patient.  Risks including, but not limited to sedation such as cardiac and pulmonary compromise, the procedure itself such as bleeding requiring hospitalization, transfusion, surgery, temporary or permanent colostomy.  Perforation or puncture of the requiring hospitalization, surgery, temporary colostomy.\par It has been explained to the patient that though colonoscopy is thought to be the best screening exam for colon cancer and polyps, no screening exam can find all colon polyps or cancers.  \par The patient expresses understanding of the procedure and consents to undergoing the procedure.\par \par 
no discharge, no irritation, no pain, no redness, and no visual changes.

## 2022-05-12 ENCOUNTER — RX RENEWAL (OUTPATIENT)
Age: 52
End: 2022-05-12

## 2022-06-01 ENCOUNTER — APPOINTMENT (OUTPATIENT)
Dept: COLORECTAL SURGERY | Facility: CLINIC | Age: 52
End: 2022-06-01
Payer: MEDICARE

## 2022-06-01 DIAGNOSIS — K64.4 RESIDUAL HEMORRHOIDAL SKIN TAGS: ICD-10-CM

## 2022-06-01 DIAGNOSIS — K59.09 OTHER CONSTIPATION: ICD-10-CM

## 2022-06-01 PROCEDURE — 99214 OFFICE O/P EST MOD 30 MIN: CPT | Mod: 25

## 2022-06-01 PROCEDURE — 46600 DIAGNOSTIC ANOSCOPY SPX: CPT

## 2022-06-01 RX ORDER — HYDROCORTISONE 25 MG/G
2.5 CREAM TOPICAL
Qty: 1 | Refills: 3 | Status: ACTIVE | COMMUNITY
Start: 2022-06-01 | End: 1900-01-01

## 2022-06-01 NOTE — ASSESSMENT
[FreeTextEntry1] : A 51-year-old male in need of colonoscopy with history of anal fissure presents with no evidence of Anorectal disease. A reinforced compliance with fiber supplementation for his chronic constipation. Proctosol cream trial to decrease inflammation the patient feels discomfort.\par \par I had an extensive discussion of the risks and benefits of performing a colonoscopy with the patient.  I explained the risks, which include but are not limited to, bleeding, infection, as well as perforation requiring emergent operative intervention and likely a colostomy. the patient understands these risks and is willing to proceed with their colonoscopy.\par

## 2022-06-01 NOTE — HISTORY OF PRESENT ILLNESS
[FreeTextEntry1] : 51-year-old male with a history of anal fissure presents with anal discomfort and sense of incomplete evacuation.

## 2022-06-01 NOTE — PHYSICAL EXAM
[de-identified] : Anoscopy reveals a well-healed surgical site. No active fissure. Noninflamed internal hemorrhoids.

## 2022-10-07 ENCOUNTER — APPOINTMENT (OUTPATIENT)
Dept: ENDOCRINOLOGY | Facility: CLINIC | Age: 52
End: 2022-10-07
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
## 2022-11-19 ENCOUNTER — RX RENEWAL (OUTPATIENT)
Age: 52
End: 2022-11-19

## 2022-11-19 RX ORDER — LOSARTAN POTASSIUM 50 MG/1
50 TABLET, FILM COATED ORAL
Qty: 90 | Refills: 0 | Status: ACTIVE | COMMUNITY
Start: 2017-02-28 | End: 1900-01-01

## 2023-08-29 DIAGNOSIS — R33.9 BENIGN PROSTATIC HYPERPLASIA WITH LOWER URINARY TRACT SYMPMS: ICD-10-CM

## 2023-08-29 DIAGNOSIS — Z87.438 PERSONAL HISTORY OF OTHER DISEASES OF MALE GENITAL ORGANS: ICD-10-CM

## 2023-08-29 DIAGNOSIS — N40.1 BENIGN PROSTATIC HYPERPLASIA WITH LOWER URINARY TRACT SYMPMS: ICD-10-CM

## 2023-08-29 DIAGNOSIS — R10.32 RIGHT LOWER QUADRANT PAIN: ICD-10-CM

## 2023-08-29 DIAGNOSIS — R35.0 FREQUENCY OF MICTURITION: ICD-10-CM

## 2023-08-29 DIAGNOSIS — R10.31 RIGHT LOWER QUADRANT PAIN: ICD-10-CM

## 2023-08-29 DIAGNOSIS — Z86.718 PERSONAL HISTORY OF OTHER VENOUS THROMBOSIS AND EMBOLISM: ICD-10-CM

## 2023-08-29 DIAGNOSIS — N40.0 BENIGN PROSTATIC HYPERPLASIA WITHOUT LOWER URINARY TRACT SYMPMS: ICD-10-CM

## 2023-08-29 DIAGNOSIS — N52.9 MALE ERECTILE DYSFUNCTION, UNSPECIFIED: ICD-10-CM

## 2023-08-29 RX ORDER — TADALAFIL 5 MG/1
5 TABLET ORAL
Refills: 0 | Status: ACTIVE | COMMUNITY

## 2023-08-29 RX ORDER — BACLOFEN 20 MG/1
20 TABLET ORAL
Refills: 0 | Status: ACTIVE | COMMUNITY

## 2023-09-07 ENCOUNTER — APPOINTMENT (OUTPATIENT)
Dept: UROLOGY | Facility: CLINIC | Age: 53
End: 2023-09-07

## 2023-09-28 NOTE — ED CDU PROVIDER DISPOSITION NOTE - NS ED ATTENDING STATEMENT MOD
I have personally performed a face to face diagnostic evaluation on this patient. I have reviewed the ACP note and agree with the history, exam and plan of care, except as noted. Ear Star Wedge Flap Text: The defect edges were debeveled with a #15 blade scalpel.  Given the location of the defect and the proximity to free margins (helical rim) an ear star wedge flap was deemed most appropriate. Using a sterile surgical marker, the appropriate flap was drawn incorporating the defect and placing the expected incisions between the helical rim and antihelix where possible.  The area thus outlined was incised through and through with a #15 scalpel blade. Following this, the designed flap was carried over into the primary defect and sutured into place.

## 2023-10-27 NOTE — H&P ADULT - ATTENDING COMMENTS
Patient seen and examined.  Agree with resident note. 47M with acute PE and DVT complicated by LLE musculoskeletal pain making it difficult for him to ambulate without assistance, PCV - routine phlebotomy, migraine.  Continue Xarelto.  Pain is improving on pain control.  Discuss with Heme about outpatient follow up. Neuro input appreciated - will inquire about MRV as outpatient as verbally passed on to us. show

## 2023-12-13 NOTE — ASU DISCHARGE PLAN (ADULT/PEDIATRIC) - CALL YOUR DOCTOR IF YOU HAVE ANY OF THE FOLLOWING:
Protocol: Photochemotherapy: Baby Oil and NBUVB Protocol For Nbuvb: Hands/Feet: The patient received NBUVB. Protocol For Photochemotherapy: Petrolatum And Broad Band Uvb: The patient received Photochemotherapy: Petrolatum and Broad Band UVB. Total Body Time: 1:40 Protocol For Photochemotherapy: Mineral Oil And Nbuvb: The patient received Photochemotherapy: Mineral Oil and NBUVB (mineral oil applied to all lesions prior to phototherapy). Protocol For Puva: The patient received PUVA. Protocol For Nb Uva: The patient received NB UVA. Consent: Written consent obtained.  The risks were reviewed with the patient including but not limited to: burn, pigmentary changes, pain, blistering, scabbing, redness, increased risk of skin cancers, and the remote possibility of scarring. Nausea and vomiting that does not stop/Fever greater than (need to indicate Fahrenheit or Celsius)/Pain not relieved by Medications/Wound/Surgical Site with redness, or foul smelling discharge or pus/Bleeding that does not stop/Numbness, tingling, color or temperature change to extremity/Swelling that gets worse Protocol For Photochemotherapy: Tar And Broad Band Uvb (Goeckerman Treatment): The patient received Photochemotherapy: Tar and Broad Band UVB (Goeckerman treatment). Protocol For Photochemotherapy For Severe Photoresponsive Dermatoses: Petrolatum And Broad Band Uvb: The patient received Photochemotherapyfor severe photoresponsive dermatoses: Petrolatum and Broad Band UVB requiring at least 4 to 8 hours of care under direct physician supervision. Total Body Energy: 3.8 Protocol For Photochemotherapy: Baby Oil And Nbuvb: The patient received Photochemotherapy: Baby Oil and NBUVB (baby oil applied to all lesions prior to phototherapy). Protocol For Photochemotherapy For Severe Photoresponsive Dermatoses: Petrolatum And Nbuvb: The patient received Photochemotherapy for severe photoresponsive dermatoses: Petrolatum and NBUVB requiring at least 4 to 8 hours of care under direct physician supervision. Protocol For Protocol For Photochemotherapy For Severe Photoresponsive Dermatoses: Bath Puva: The patient received Photochemotherapy for severe photoresponsive dermatoses: Bath PUVA requiring at least 4 to 8 hours of care under direct physician supervision. Skin Type: II Name Of Supervising Technician: Elias Protocol For Broad Band Uvb: The patient received Broad Band UVB. Post-Care Instructions: I reviewed with the patient in detail post-care instructions. Patient is to wear sun protection. Patients may expect sunburn like redness, discomfort and scabbing. Treatment Number: 7 Protocol For Photochemotherapy: Petrolatum And Nbuvb: The patient received Photochemotherapy: Petrolatum and NBUVB (petrolatum applied to all lesions prior to phototherapy). Protocol For Photochemotherapy For Severe Photoresponsive Dermatoses: Tar And Broad Band Uvb (Goeckerman Treatment): The patient received Photochemotherapy for severe photoresponsive dermatoses: Tar and Broad Band UVB (Goeckerman treatment) requiring at least 4 to 8 hours of care under direct physician supervision. Protocol For Photochemotherapy For Severe Photoresponsive Dermatoses: Tar And Nbuvb (Goeckerman Treatment): The patient received Photochemotherapy for severe photoresponsive dermatoses: Tar and NBUVB (Goeckerman treatment) requiring at least 4 to 8 hours of care under direct physician supervision. Protocol For Bath Puva: The patient received Bath PUVA. Protocol For Uva1: The patient received UVA1. Protocol For Photochemotherapy: Tar And Nbuvb (Goeckerman Treatment): The patient received Photochemotherapy: Tar and NBUVB (Goeckerman treatment). Protocol For Photochemotherapy: Mineral Oil And Broad Band Uvb: The patient received Photochemotherapy: Mineral Oil and Broad Band UVB. Irradiance (Optional- Include Units): 380 Protocol For Photochemotherapy: Triamcinolone Ointment And Nbuvb: The patient received Photochemotherapy: Triamcinolone and NBUVB (triamcinolone ointment applied to all lesions prior to phototherapy). Detail Level: Zone Protocol For Photochemotherapy For Severe Photoresponsive Dermatoses: Puva: The patient received Photochemotherapy for severe photoresponsive dermatoses: PUVA requiring at least 4 to 8 hours of care under direct physician supervision. Protocol For Uva: The patient received UVA. Render Post-Care In The Note: no Numbness, tingling, color or temperature change to extremity/Swelling that gets worse/Fever greater than (need to indicate Fahrenheit or Celsius)/Wound/Surgical Site with redness, or foul smelling discharge or pus/Nausea and vomiting that does not stop/Unable to urinate/Pain not relieved by Medications/Bleeding that does not stop

## 2024-08-12 NOTE — H&P PST ADULT - RX
LAST VISIT:   8/6/2024     Future Appointments   Date Time Provider Department Center   10/22/2024 10:40 AM Jamil Wills MD [facility]   11/20/2024  2:00 PM Rohit Ying MD [facility]       no CPAP

## 2025-04-14 NOTE — ED ADULT TRIAGE NOTE - BP NONINVASIVE SYSTOLIC (MM HG)
Message to Dr. Haque to get clearance to hold blood thinner Eliquis 2 days and ASA 81 mg prior to surgery on 5/7 with Dr. De La Rosa.  Please advise    145